# Patient Record
Sex: FEMALE | Race: WHITE | Employment: OTHER | URBAN - METROPOLITAN AREA
[De-identification: names, ages, dates, MRNs, and addresses within clinical notes are randomized per-mention and may not be internally consistent; named-entity substitution may affect disease eponyms.]

---

## 2017-01-03 ENCOUNTER — TELEPHONE (OUTPATIENT)
Dept: FAMILY MEDICINE CLINIC | Age: 82
End: 2017-01-03

## 2017-01-24 ENCOUNTER — TELEPHONE (OUTPATIENT)
Dept: FAMILY MEDICINE CLINIC | Age: 82
End: 2017-01-24

## 2017-02-02 ENCOUNTER — OFFICE VISIT (OUTPATIENT)
Dept: FAMILY MEDICINE CLINIC | Age: 82
End: 2017-02-02

## 2017-02-02 ENCOUNTER — TELEPHONE (OUTPATIENT)
Dept: FAMILY MEDICINE CLINIC | Age: 82
End: 2017-02-02

## 2017-02-02 VITALS
BODY MASS INDEX: 29.27 KG/M2 | SYSTOLIC BLOOD PRESSURE: 130 MMHG | TEMPERATURE: 98.6 F | DIASTOLIC BLOOD PRESSURE: 62 MMHG | OXYGEN SATURATION: 96 % | HEIGHT: 59 IN | HEART RATE: 53 BPM | WEIGHT: 145.2 LBS

## 2017-02-02 DIAGNOSIS — F41.9 ANXIETY AND DEPRESSION: ICD-10-CM

## 2017-02-02 DIAGNOSIS — F51.04 PSYCHOPHYSIOLOGICAL INSOMNIA: ICD-10-CM

## 2017-02-02 DIAGNOSIS — I48.0 PAROXYSMAL ATRIAL FIBRILLATION (HCC): ICD-10-CM

## 2017-02-02 DIAGNOSIS — F32.A ANXIETY AND DEPRESSION: ICD-10-CM

## 2017-02-02 DIAGNOSIS — F03.90 DEMENTIA WITHOUT BEHAVIORAL DISTURBANCE, UNSPECIFIED DEMENTIA TYPE: Primary | ICD-10-CM

## 2017-02-02 PROCEDURE — G8484 FLU IMMUNIZE NO ADMIN: HCPCS | Performed by: FAMILY MEDICINE

## 2017-02-02 PROCEDURE — 1123F ACP DISCUSS/DSCN MKR DOCD: CPT | Performed by: FAMILY MEDICINE

## 2017-02-02 PROCEDURE — 1036F TOBACCO NON-USER: CPT | Performed by: FAMILY MEDICINE

## 2017-02-02 PROCEDURE — 4040F PNEUMOC VAC/ADMIN/RCVD: CPT | Performed by: FAMILY MEDICINE

## 2017-02-02 PROCEDURE — 1090F PRES/ABSN URINE INCON ASSESS: CPT | Performed by: FAMILY MEDICINE

## 2017-02-02 PROCEDURE — G8399 PT W/DXA RESULTS DOCUMENT: HCPCS | Performed by: FAMILY MEDICINE

## 2017-02-02 PROCEDURE — 99213 OFFICE O/P EST LOW 20 MIN: CPT | Performed by: FAMILY MEDICINE

## 2017-02-02 PROCEDURE — G8427 DOCREV CUR MEDS BY ELIG CLIN: HCPCS | Performed by: FAMILY MEDICINE

## 2017-02-02 PROCEDURE — G8420 CALC BMI NORM PARAMETERS: HCPCS | Performed by: FAMILY MEDICINE

## 2017-02-02 RX ORDER — CITALOPRAM 20 MG/1
20 TABLET ORAL DAILY
Qty: 30 TABLET | Refills: 11 | Status: SHIPPED | OUTPATIENT
Start: 2017-02-02 | End: 2018-02-05 | Stop reason: SDUPTHER

## 2017-02-02 RX ORDER — LANOLIN ALCOHOL/MO/W.PET/CERES
3 CREAM (GRAM) TOPICAL NIGHTLY
Qty: 30 TABLET | Refills: 3 | Status: SHIPPED | OUTPATIENT
Start: 2017-02-02 | End: 2020-06-25 | Stop reason: ALTCHOICE

## 2017-02-02 ASSESSMENT — ENCOUNTER SYMPTOMS
DIARRHEA: 0
SHORTNESS OF BREATH: 0
ABDOMINAL PAIN: 0
SORE THROAT: 0
COUGH: 0
WHEEZING: 0
RHINORRHEA: 0
CONSTIPATION: 0

## 2017-02-06 ENCOUNTER — CARE COORDINATION (OUTPATIENT)
Dept: CARE COORDINATION | Age: 82
End: 2017-02-06

## 2017-02-06 ENCOUNTER — TELEPHONE (OUTPATIENT)
Dept: FAMILY MEDICINE CLINIC | Age: 82
End: 2017-02-06

## 2017-02-06 DIAGNOSIS — F03.90 DEMENTIA WITHOUT BEHAVIORAL DISTURBANCE, UNSPECIFIED DEMENTIA TYPE: Primary | ICD-10-CM

## 2017-02-06 RX ORDER — OMEPRAZOLE 20 MG/1
CAPSULE, DELAYED RELEASE ORAL
Qty: 90 CAPSULE | Refills: 3 | Status: SHIPPED | OUTPATIENT
Start: 2017-02-06 | End: 2018-02-04 | Stop reason: SDUPTHER

## 2017-02-06 RX ORDER — DONEPEZIL HYDROCHLORIDE 5 MG/1
TABLET, FILM COATED ORAL
Qty: 90 TABLET | Refills: 3 | Status: SHIPPED | OUTPATIENT
Start: 2017-02-06 | End: 2018-02-04 | Stop reason: SDUPTHER

## 2017-02-07 ENCOUNTER — CARE COORDINATION (OUTPATIENT)
Dept: CARE COORDINATION | Age: 82
End: 2017-02-07

## 2017-02-07 ENCOUNTER — CARE COORDINATION (OUTPATIENT)
Dept: INTERNAL MEDICINE | Age: 82
End: 2017-02-07

## 2017-02-08 ENCOUNTER — CARE COORDINATION (OUTPATIENT)
Dept: INTERNAL MEDICINE | Age: 82
End: 2017-02-08

## 2017-02-08 ENCOUNTER — TELEPHONE (OUTPATIENT)
Dept: CARE COORDINATION | Age: 82
End: 2017-02-08

## 2017-02-08 RX ORDER — NITROFURANTOIN 25; 75 MG/1; MG/1
100 CAPSULE ORAL 2 TIMES DAILY
COMMUNITY
End: 2017-03-20 | Stop reason: ALTCHOICE

## 2017-02-08 RX ORDER — MAGNESIUM OXIDE 400 MG/1
400 TABLET ORAL DAILY
COMMUNITY
End: 2018-01-22

## 2017-02-10 ENCOUNTER — TELEPHONE (OUTPATIENT)
Dept: FAMILY MEDICINE CLINIC | Age: 82
End: 2017-02-10

## 2017-02-20 RX ORDER — GLIPIZIDE 10 MG/1
TABLET ORAL
Qty: 90 TABLET | Refills: 3 | Status: SHIPPED | OUTPATIENT
Start: 2017-02-20 | End: 2017-09-11

## 2017-02-21 ENCOUNTER — TELEPHONE (OUTPATIENT)
Dept: FAMILY MEDICINE CLINIC | Age: 82
End: 2017-02-21

## 2017-02-21 DIAGNOSIS — R30.0 DYSURIA: Primary | ICD-10-CM

## 2017-02-21 PROCEDURE — 81003 URINALYSIS AUTO W/O SCOPE: CPT | Performed by: FAMILY MEDICINE

## 2017-03-03 LAB
BILIRUBIN, POC: ABNORMAL
BLOOD URINE, POC: ABNORMAL
CLARITY, POC: CLEAR
COLOR, POC: ABNORMAL
GLUCOSE URINE, POC: ABNORMAL
KETONES, POC: ABNORMAL
LEUKOCYTE EST, POC: ABNORMAL
NITRITE, POC: ABNORMAL
PH, POC: 5.5
PROTEIN, POC: ABNORMAL
SPECIFIC GRAVITY, POC: 1.02
UROBILINOGEN, POC: ABNORMAL

## 2017-03-06 ENCOUNTER — HOSPITAL ENCOUNTER (OUTPATIENT)
Age: 82
Setting detail: SPECIMEN
Discharge: HOME | End: 2017-03-06
Payer: MEDICARE

## 2017-03-06 DIAGNOSIS — R30.0 DYSURIA: ICD-10-CM

## 2017-03-06 PROCEDURE — 87077 CULTURE AEROBIC IDENTIFY: CPT

## 2017-03-06 PROCEDURE — 87086 URINE CULTURE/COLONY COUNT: CPT

## 2017-03-06 PROCEDURE — 87186 SC STD MICRODIL/AGAR DIL: CPT

## 2017-03-06 RX ORDER — SULFAMETHOXAZOLE AND TRIMETHOPRIM 800; 160 MG/1; MG/1
1 TABLET ORAL 2 TIMES DAILY
Qty: 6 TABLET | Refills: 0 | Status: SHIPPED | OUTPATIENT
Start: 2017-03-06 | End: 2017-03-09

## 2017-03-09 LAB
ORGANISM: ABNORMAL
URINE CULTURE, ROUTINE: ABNORMAL

## 2017-03-14 ENCOUNTER — CARE COORDINATION (OUTPATIENT)
Dept: FAMILY MEDICINE CLINIC | Age: 82
End: 2017-03-14

## 2017-03-20 ENCOUNTER — CARE COORDINATION (OUTPATIENT)
Dept: FAMILY MEDICINE CLINIC | Age: 82
End: 2017-03-20

## 2017-03-20 ENCOUNTER — CARE COORDINATION (OUTPATIENT)
Dept: INTERNAL MEDICINE | Age: 82
End: 2017-03-20

## 2017-03-24 ENCOUNTER — TELEPHONE (OUTPATIENT)
Dept: FAMILY MEDICINE CLINIC | Age: 82
End: 2017-03-24

## 2017-03-24 DIAGNOSIS — R41.0 CONFUSION: ICD-10-CM

## 2017-03-24 DIAGNOSIS — R35.0 URINE FREQUENCY: Primary | ICD-10-CM

## 2017-03-24 DIAGNOSIS — N30.00 ACUTE CYSTITIS WITHOUT HEMATURIA: ICD-10-CM

## 2017-03-24 LAB
BILIRUBIN, POC: ABNORMAL
BLOOD URINE, POC: ABNORMAL
CLARITY, POC: ABNORMAL
COLOR, POC: ABNORMAL
GLUCOSE URINE, POC: ABNORMAL
KETONES, POC: ABNORMAL
LEUKOCYTE EST, POC: ABNORMAL
NITRITE, POC: ABNORMAL
PH, POC: 5
PROTEIN, POC: ABNORMAL
SPECIFIC GRAVITY, POC: 1.02
UROBILINOGEN, POC: ABNORMAL

## 2017-03-24 PROCEDURE — 81003 URINALYSIS AUTO W/O SCOPE: CPT | Performed by: FAMILY MEDICINE

## 2017-03-24 RX ORDER — SULFAMETHOXAZOLE AND TRIMETHOPRIM 800; 160 MG/1; MG/1
1 TABLET ORAL 2 TIMES DAILY
Qty: 6 TABLET | Refills: 0 | Status: SHIPPED | OUTPATIENT
Start: 2017-03-24 | End: 2017-03-27

## 2017-04-04 DIAGNOSIS — E11.8 TYPE 2 DIABETES MELLITUS WITH COMPLICATION, WITHOUT LONG-TERM CURRENT USE OF INSULIN (HCC): Primary | ICD-10-CM

## 2017-04-05 RX ORDER — BLOOD-GLUCOSE METER
KIT MISCELLANEOUS
Qty: 1 KIT | Refills: 0 | Status: SHIPPED | OUTPATIENT
Start: 2017-04-05

## 2017-04-11 ENCOUNTER — TELEPHONE (OUTPATIENT)
Dept: FAMILY MEDICINE CLINIC | Age: 82
End: 2017-04-11

## 2017-04-11 DIAGNOSIS — E11.8 TYPE 2 DIABETES MELLITUS WITH COMPLICATION, WITHOUT LONG-TERM CURRENT USE OF INSULIN (HCC): Primary | ICD-10-CM

## 2017-04-11 DIAGNOSIS — F03.90 DEMENTIA WITHOUT BEHAVIORAL DISTURBANCE, UNSPECIFIED DEMENTIA TYPE: ICD-10-CM

## 2017-04-13 RX ORDER — HALOPERIDOL 0.5 MG/1
0.5 TABLET ORAL NIGHTLY PRN
Qty: 30 TABLET | Refills: 0 | Status: SHIPPED | OUTPATIENT
Start: 2017-04-13 | End: 2017-04-17

## 2017-04-17 RX ORDER — OLANZAPINE 5 MG/1
5 TABLET ORAL NIGHTLY PRN
Qty: 15 TABLET | Refills: 0 | Status: SHIPPED | OUTPATIENT
Start: 2017-04-17 | End: 2017-05-28 | Stop reason: SDUPTHER

## 2017-04-27 ENCOUNTER — CARE COORDINATION (OUTPATIENT)
Dept: INTERNAL MEDICINE | Age: 82
End: 2017-04-27

## 2017-05-09 ENCOUNTER — CARE COORDINATION (OUTPATIENT)
Dept: INTERNAL MEDICINE | Age: 82
End: 2017-05-09

## 2017-05-09 ENCOUNTER — CARE COORDINATION (OUTPATIENT)
Dept: FAMILY MEDICINE CLINIC | Age: 82
End: 2017-05-09

## 2017-05-19 ENCOUNTER — CARE COORDINATION (OUTPATIENT)
Dept: FAMILY MEDICINE CLINIC | Age: 82
End: 2017-05-19

## 2017-05-28 DIAGNOSIS — F03.90 DEMENTIA WITHOUT BEHAVIORAL DISTURBANCE, UNSPECIFIED DEMENTIA TYPE: ICD-10-CM

## 2017-05-30 RX ORDER — OLANZAPINE 5 MG/1
TABLET ORAL
Qty: 15 TABLET | Refills: 0 | Status: SHIPPED | OUTPATIENT
Start: 2017-05-30 | End: 2017-07-31 | Stop reason: SDUPTHER

## 2017-06-01 ENCOUNTER — TELEPHONE (OUTPATIENT)
Dept: FAMILY MEDICINE CLINIC | Age: 82
End: 2017-06-01

## 2017-06-01 DIAGNOSIS — R30.0 DYSURIA: Primary | ICD-10-CM

## 2017-06-01 LAB
BILIRUBIN, POC: NORMAL
BLOOD URINE, POC: NORMAL
CLARITY, POC: CLEAR
COLOR, POC: YELLOW
GLUCOSE URINE, POC: NORMAL
KETONES, POC: NORMAL
LEUKOCYTE EST, POC: NORMAL
NITRITE, POC: NORMAL
PH, POC: 7.5
PROTEIN, POC: NORMAL
SPECIFIC GRAVITY, POC: 1.01
UROBILINOGEN, POC: NORMAL

## 2017-06-01 PROCEDURE — 81003 URINALYSIS AUTO W/O SCOPE: CPT | Performed by: FAMILY MEDICINE

## 2017-06-07 ENCOUNTER — CARE COORDINATION (OUTPATIENT)
Dept: CARE COORDINATION | Age: 82
End: 2017-06-07

## 2017-06-14 ENCOUNTER — TELEPHONE (OUTPATIENT)
Dept: FAMILY MEDICINE CLINIC | Age: 82
End: 2017-06-14

## 2017-06-14 DIAGNOSIS — K64.9 HEMORRHOIDS, UNSPECIFIED HEMORRHOID TYPE: Primary | ICD-10-CM

## 2017-06-14 DIAGNOSIS — E11.8 TYPE 2 DIABETES MELLITUS WITH COMPLICATION, WITHOUT LONG-TERM CURRENT USE OF INSULIN (HCC): ICD-10-CM

## 2017-06-14 RX ORDER — LANCETS 30 GAUGE
1 EACH MISCELLANEOUS 3 TIMES DAILY
Qty: 100 EACH | Refills: 11 | Status: SHIPPED | OUTPATIENT
Start: 2017-06-14

## 2017-06-14 RX ORDER — GLUCOSAMINE HCL/CHONDROITIN SU 500-400 MG
CAPSULE ORAL
Qty: 100 STRIP | Refills: 11 | Status: SHIPPED | OUTPATIENT
Start: 2017-06-14

## 2017-06-14 RX ORDER — SYRING-NEEDL,DISP,INSUL,0.3 ML 30 GX5/16"
1 SYRINGE, EMPTY DISPOSABLE MISCELLANEOUS
Qty: 100 EACH | Refills: 5 | Status: SHIPPED | OUTPATIENT
Start: 2017-06-14 | End: 2017-06-14

## 2017-06-26 ENCOUNTER — OFFICE VISIT (OUTPATIENT)
Dept: FAMILY MEDICINE CLINIC | Age: 82
End: 2017-06-26

## 2017-06-26 VITALS
SYSTOLIC BLOOD PRESSURE: 130 MMHG | TEMPERATURE: 99.3 F | BODY MASS INDEX: 29.89 KG/M2 | OXYGEN SATURATION: 93 % | DIASTOLIC BLOOD PRESSURE: 62 MMHG | HEART RATE: 60 BPM | WEIGHT: 148 LBS

## 2017-06-26 DIAGNOSIS — S39.012A STRAIN OF MUSCLE, FASCIA AND TENDON OF LOWER BACK, INITIAL ENCOUNTER: ICD-10-CM

## 2017-06-26 DIAGNOSIS — I10 ESSENTIAL HYPERTENSION: ICD-10-CM

## 2017-06-26 DIAGNOSIS — H66.002 ACUTE SUPPURATIVE OTITIS MEDIA OF LEFT EAR WITHOUT SPONTANEOUS RUPTURE OF TYMPANIC MEMBRANE, RECURRENCE NOT SPECIFIED: Primary | ICD-10-CM

## 2017-06-26 DIAGNOSIS — F03.90 DEMENTIA WITHOUT BEHAVIORAL DISTURBANCE, UNSPECIFIED DEMENTIA TYPE: ICD-10-CM

## 2017-06-26 PROCEDURE — G8427 DOCREV CUR MEDS BY ELIG CLIN: HCPCS | Performed by: FAMILY MEDICINE

## 2017-06-26 PROCEDURE — 1090F PRES/ABSN URINE INCON ASSESS: CPT | Performed by: FAMILY MEDICINE

## 2017-06-26 PROCEDURE — 1123F ACP DISCUSS/DSCN MKR DOCD: CPT | Performed by: FAMILY MEDICINE

## 2017-06-26 PROCEDURE — 1036F TOBACCO NON-USER: CPT | Performed by: FAMILY MEDICINE

## 2017-06-26 PROCEDURE — G8419 CALC BMI OUT NRM PARAM NOF/U: HCPCS | Performed by: FAMILY MEDICINE

## 2017-06-26 PROCEDURE — G8399 PT W/DXA RESULTS DOCUMENT: HCPCS | Performed by: FAMILY MEDICINE

## 2017-06-26 PROCEDURE — 4040F PNEUMOC VAC/ADMIN/RCVD: CPT | Performed by: FAMILY MEDICINE

## 2017-06-26 PROCEDURE — 99214 OFFICE O/P EST MOD 30 MIN: CPT | Performed by: FAMILY MEDICINE

## 2017-06-26 RX ORDER — GUAIFENESIN AND DEXTROMETHORPHAN HYDROBROMIDE 600; 30 MG/1; MG/1
1 TABLET, EXTENDED RELEASE ORAL 2 TIMES DAILY PRN
Qty: 20 TABLET | Refills: 1 | Status: SHIPPED | OUTPATIENT
Start: 2017-06-26 | End: 2017-07-06

## 2017-06-26 RX ORDER — AMOXICILLIN 250 MG/1
500 CAPSULE ORAL 2 TIMES DAILY
Qty: 40 CAPSULE | Refills: 0 | Status: SHIPPED | OUTPATIENT
Start: 2017-06-26 | End: 2017-07-06

## 2017-06-26 ASSESSMENT — ENCOUNTER SYMPTOMS
SORE THROAT: 0
SHORTNESS OF BREATH: 0
RHINORRHEA: 1
WHEEZING: 0
ABDOMINAL PAIN: 0
CONSTIPATION: 0
COUGH: 1
DIARRHEA: 0

## 2017-06-29 ENCOUNTER — TELEPHONE (OUTPATIENT)
Dept: FAMILY MEDICINE CLINIC | Age: 82
End: 2017-06-29

## 2017-06-30 ENCOUNTER — CARE COORDINATION (OUTPATIENT)
Dept: CARE COORDINATION | Age: 82
End: 2017-06-30

## 2017-07-06 ENCOUNTER — TELEPHONE (OUTPATIENT)
Dept: FAMILY MEDICINE CLINIC | Age: 82
End: 2017-07-06

## 2017-07-12 ENCOUNTER — CARE COORDINATION (OUTPATIENT)
Dept: CARE COORDINATION | Age: 82
End: 2017-07-12

## 2017-07-13 ENCOUNTER — OFFICE VISIT (OUTPATIENT)
Dept: FAMILY MEDICINE CLINIC | Age: 82
End: 2017-07-13

## 2017-07-13 ENCOUNTER — CARE COORDINATOR VISIT (OUTPATIENT)
Dept: FAMILY MEDICINE CLINIC | Age: 82
End: 2017-07-13

## 2017-07-13 VITALS
WEIGHT: 147.2 LBS | OXYGEN SATURATION: 96 % | SYSTOLIC BLOOD PRESSURE: 122 MMHG | BODY MASS INDEX: 29.73 KG/M2 | DIASTOLIC BLOOD PRESSURE: 66 MMHG | TEMPERATURE: 98.4 F | HEART RATE: 70 BPM

## 2017-07-13 DIAGNOSIS — R07.81 RIB PAIN ON RIGHT SIDE: Primary | ICD-10-CM

## 2017-07-13 DIAGNOSIS — I10 ESSENTIAL HYPERTENSION: ICD-10-CM

## 2017-07-13 DIAGNOSIS — E11.8 TYPE 2 DIABETES MELLITUS WITH COMPLICATION, WITHOUT LONG-TERM CURRENT USE OF INSULIN (HCC): ICD-10-CM

## 2017-07-13 DIAGNOSIS — Z91.81 AT HIGH RISK FOR FALLS: ICD-10-CM

## 2017-07-13 LAB — HBA1C MFR BLD: 7.7 %

## 2017-07-13 PROCEDURE — 1036F TOBACCO NON-USER: CPT | Performed by: FAMILY MEDICINE

## 2017-07-13 PROCEDURE — 83036 HEMOGLOBIN GLYCOSYLATED A1C: CPT | Performed by: FAMILY MEDICINE

## 2017-07-13 PROCEDURE — 1090F PRES/ABSN URINE INCON ASSESS: CPT | Performed by: FAMILY MEDICINE

## 2017-07-13 PROCEDURE — G8399 PT W/DXA RESULTS DOCUMENT: HCPCS | Performed by: FAMILY MEDICINE

## 2017-07-13 PROCEDURE — 99214 OFFICE O/P EST MOD 30 MIN: CPT | Performed by: FAMILY MEDICINE

## 2017-07-13 PROCEDURE — G8419 CALC BMI OUT NRM PARAM NOF/U: HCPCS | Performed by: FAMILY MEDICINE

## 2017-07-13 PROCEDURE — 4040F PNEUMOC VAC/ADMIN/RCVD: CPT | Performed by: FAMILY MEDICINE

## 2017-07-13 PROCEDURE — 1123F ACP DISCUSS/DSCN MKR DOCD: CPT | Performed by: FAMILY MEDICINE

## 2017-07-13 PROCEDURE — G8427 DOCREV CUR MEDS BY ELIG CLIN: HCPCS | Performed by: FAMILY MEDICINE

## 2017-07-13 RX ORDER — ACETAMINOPHEN 500 MG
1000 TABLET ORAL 3 TIMES DAILY PRN
Qty: 60 TABLET | Refills: 3 | Status: SHIPPED | OUTPATIENT
Start: 2017-07-13

## 2017-07-13 ASSESSMENT — ENCOUNTER SYMPTOMS
WHEEZING: 0
ABDOMINAL PAIN: 0
RHINORRHEA: 0
SHORTNESS OF BREATH: 0
CONSTIPATION: 0
DIARRHEA: 0
SORE THROAT: 0
COUGH: 0

## 2017-07-31 DIAGNOSIS — F03.90 DEMENTIA WITHOUT BEHAVIORAL DISTURBANCE, UNSPECIFIED DEMENTIA TYPE: ICD-10-CM

## 2017-07-31 RX ORDER — OLANZAPINE 5 MG/1
TABLET ORAL
Qty: 15 TABLET | Refills: 0 | Status: SHIPPED | OUTPATIENT
Start: 2017-07-31 | End: 2017-09-23 | Stop reason: SDUPTHER

## 2017-08-02 ENCOUNTER — CARE COORDINATION (OUTPATIENT)
Dept: CARE COORDINATION | Age: 82
End: 2017-08-02

## 2017-08-24 ENCOUNTER — CARE COORDINATION (OUTPATIENT)
Dept: FAMILY MEDICINE CLINIC | Age: 82
End: 2017-08-24

## 2017-08-25 ENCOUNTER — CARE COORDINATION (OUTPATIENT)
Dept: FAMILY MEDICINE CLINIC | Age: 82
End: 2017-08-25

## 2017-08-28 ENCOUNTER — OFFICE VISIT (OUTPATIENT)
Dept: FAMILY MEDICINE CLINIC | Age: 82
End: 2017-08-28

## 2017-08-28 VITALS
HEART RATE: 60 BPM | WEIGHT: 154 LBS | HEIGHT: 59 IN | BODY MASS INDEX: 31.04 KG/M2 | OXYGEN SATURATION: 98 % | DIASTOLIC BLOOD PRESSURE: 70 MMHG | TEMPERATURE: 98.7 F | SYSTOLIC BLOOD PRESSURE: 124 MMHG

## 2017-08-28 DIAGNOSIS — R55 SYNCOPE, UNSPECIFIED SYNCOPE TYPE: ICD-10-CM

## 2017-08-28 DIAGNOSIS — F03.90 DEMENTIA WITHOUT BEHAVIORAL DISTURBANCE, UNSPECIFIED DEMENTIA TYPE: ICD-10-CM

## 2017-08-28 DIAGNOSIS — H61.23 BILATERAL IMPACTED CERUMEN: ICD-10-CM

## 2017-08-28 DIAGNOSIS — Z09 HOSPITAL DISCHARGE FOLLOW-UP: Primary | ICD-10-CM

## 2017-08-28 PROCEDURE — G8419 CALC BMI OUT NRM PARAM NOF/U: HCPCS | Performed by: FAMILY MEDICINE

## 2017-08-28 PROCEDURE — 4040F PNEUMOC VAC/ADMIN/RCVD: CPT | Performed by: FAMILY MEDICINE

## 2017-08-28 PROCEDURE — 1036F TOBACCO NON-USER: CPT | Performed by: FAMILY MEDICINE

## 2017-08-28 PROCEDURE — G8427 DOCREV CUR MEDS BY ELIG CLIN: HCPCS | Performed by: FAMILY MEDICINE

## 2017-08-28 PROCEDURE — 99213 OFFICE O/P EST LOW 20 MIN: CPT | Performed by: FAMILY MEDICINE

## 2017-08-28 PROCEDURE — 1090F PRES/ABSN URINE INCON ASSESS: CPT | Performed by: FAMILY MEDICINE

## 2017-08-28 PROCEDURE — 1123F ACP DISCUSS/DSCN MKR DOCD: CPT | Performed by: FAMILY MEDICINE

## 2017-08-28 PROCEDURE — G8399 PT W/DXA RESULTS DOCUMENT: HCPCS | Performed by: FAMILY MEDICINE

## 2017-08-28 ASSESSMENT — ENCOUNTER SYMPTOMS
COUGH: 0
RHINORRHEA: 0
WHEEZING: 0
ABDOMINAL PAIN: 0
SHORTNESS OF BREATH: 0
SORE THROAT: 0
CONSTIPATION: 0
DIARRHEA: 0

## 2017-08-29 ENCOUNTER — CARE COORDINATION (OUTPATIENT)
Dept: CARE COORDINATION | Age: 82
End: 2017-08-29

## 2017-09-05 ENCOUNTER — OFFICE VISIT (OUTPATIENT)
Dept: FAMILY MEDICINE CLINIC | Age: 82
End: 2017-09-05

## 2017-09-05 VITALS
DIASTOLIC BLOOD PRESSURE: 80 MMHG | OXYGEN SATURATION: 95 % | HEART RATE: 61 BPM | SYSTOLIC BLOOD PRESSURE: 110 MMHG | TEMPERATURE: 98.9 F

## 2017-09-05 DIAGNOSIS — S98.132S: ICD-10-CM

## 2017-09-05 DIAGNOSIS — S98.219A TRAUMATIC AMPUTATION OF MULTIPLE TOES (HCC): ICD-10-CM

## 2017-09-05 DIAGNOSIS — Z89.9 H/O AMPUTATION: ICD-10-CM

## 2017-09-05 DIAGNOSIS — R41.82 ALTERED MENTAL STATUS, UNSPECIFIED ALTERED MENTAL STATUS TYPE: Primary | ICD-10-CM

## 2017-09-05 PROCEDURE — 1123F ACP DISCUSS/DSCN MKR DOCD: CPT | Performed by: PHYSICIAN ASSISTANT

## 2017-09-05 PROCEDURE — G8399 PT W/DXA RESULTS DOCUMENT: HCPCS | Performed by: PHYSICIAN ASSISTANT

## 2017-09-05 PROCEDURE — 4040F PNEUMOC VAC/ADMIN/RCVD: CPT | Performed by: PHYSICIAN ASSISTANT

## 2017-09-05 PROCEDURE — 1036F TOBACCO NON-USER: CPT | Performed by: PHYSICIAN ASSISTANT

## 2017-09-05 PROCEDURE — 1090F PRES/ABSN URINE INCON ASSESS: CPT | Performed by: PHYSICIAN ASSISTANT

## 2017-09-05 PROCEDURE — G8417 CALC BMI ABV UP PARAM F/U: HCPCS | Performed by: PHYSICIAN ASSISTANT

## 2017-09-05 PROCEDURE — 99212 OFFICE O/P EST SF 10 MIN: CPT | Performed by: PHYSICIAN ASSISTANT

## 2017-09-05 PROCEDURE — G8427 DOCREV CUR MEDS BY ELIG CLIN: HCPCS | Performed by: PHYSICIAN ASSISTANT

## 2017-09-07 PROBLEM — S98.139A: Status: ACTIVE | Noted: 2017-09-07

## 2017-09-11 ENCOUNTER — OFFICE VISIT (OUTPATIENT)
Dept: FAMILY MEDICINE CLINIC | Age: 82
End: 2017-09-11

## 2017-09-11 VITALS
HEART RATE: 64 BPM | DIASTOLIC BLOOD PRESSURE: 80 MMHG | TEMPERATURE: 98.2 F | OXYGEN SATURATION: 95 % | SYSTOLIC BLOOD PRESSURE: 145 MMHG | WEIGHT: 152.8 LBS | BODY MASS INDEX: 30.86 KG/M2

## 2017-09-11 DIAGNOSIS — I10 ESSENTIAL HYPERTENSION: Primary | ICD-10-CM

## 2017-09-11 DIAGNOSIS — F03.90 DEMENTIA WITHOUT BEHAVIORAL DISTURBANCE, UNSPECIFIED DEMENTIA TYPE: ICD-10-CM

## 2017-09-11 DIAGNOSIS — I48.0 PAROXYSMAL ATRIAL FIBRILLATION (HCC): ICD-10-CM

## 2017-09-11 DIAGNOSIS — E11.8 TYPE 2 DIABETES MELLITUS WITH COMPLICATION, WITHOUT LONG-TERM CURRENT USE OF INSULIN (HCC): ICD-10-CM

## 2017-09-11 DIAGNOSIS — R06.02 SOB (SHORTNESS OF BREATH): ICD-10-CM

## 2017-09-11 PROCEDURE — 1036F TOBACCO NON-USER: CPT | Performed by: FAMILY MEDICINE

## 2017-09-11 PROCEDURE — 4040F PNEUMOC VAC/ADMIN/RCVD: CPT | Performed by: FAMILY MEDICINE

## 2017-09-11 PROCEDURE — 99214 OFFICE O/P EST MOD 30 MIN: CPT | Performed by: FAMILY MEDICINE

## 2017-09-11 PROCEDURE — 1123F ACP DISCUSS/DSCN MKR DOCD: CPT | Performed by: FAMILY MEDICINE

## 2017-09-11 PROCEDURE — G8399 PT W/DXA RESULTS DOCUMENT: HCPCS | Performed by: FAMILY MEDICINE

## 2017-09-11 PROCEDURE — G8417 CALC BMI ABV UP PARAM F/U: HCPCS | Performed by: FAMILY MEDICINE

## 2017-09-11 PROCEDURE — G8427 DOCREV CUR MEDS BY ELIG CLIN: HCPCS | Performed by: FAMILY MEDICINE

## 2017-09-11 PROCEDURE — 1090F PRES/ABSN URINE INCON ASSESS: CPT | Performed by: FAMILY MEDICINE

## 2017-09-11 RX ORDER — GLIPIZIDE 10 MG/1
10 TABLET ORAL DAILY
Qty: 60 TABLET | Refills: 3 | Status: SHIPPED | OUTPATIENT
Start: 2017-09-11 | End: 2018-06-04 | Stop reason: SDUPTHER

## 2017-09-11 RX ORDER — AMLODIPINE BESYLATE 5 MG/1
5 TABLET ORAL DAILY
COMMUNITY
Start: 2017-09-06 | End: 2017-10-06 | Stop reason: SDUPTHER

## 2017-09-11 RX ORDER — ENALAPRIL MALEATE 10 MG/1
10 TABLET ORAL DAILY
Qty: 30 TABLET | Refills: 3 | Status: SHIPPED | OUTPATIENT
Start: 2017-09-11 | End: 2018-02-26 | Stop reason: SDUPTHER

## 2017-09-11 ASSESSMENT — ENCOUNTER SYMPTOMS
ABDOMINAL PAIN: 0
RHINORRHEA: 0
COUGH: 0
WHEEZING: 0
SORE THROAT: 0
DIARRHEA: 0
CONSTIPATION: 0
SHORTNESS OF BREATH: 0

## 2017-09-14 ENCOUNTER — CARE COORDINATION (OUTPATIENT)
Dept: CARE COORDINATION | Age: 82
End: 2017-09-14

## 2017-09-15 ENCOUNTER — CARE COORDINATION (OUTPATIENT)
Dept: FAMILY MEDICINE CLINIC | Age: 82
End: 2017-09-15

## 2017-09-23 DIAGNOSIS — F03.90 DEMENTIA WITHOUT BEHAVIORAL DISTURBANCE, UNSPECIFIED DEMENTIA TYPE: ICD-10-CM

## 2017-09-25 RX ORDER — OLANZAPINE 5 MG/1
TABLET ORAL
Qty: 15 TABLET | Refills: 0 | Status: SHIPPED | OUTPATIENT
Start: 2017-09-25 | End: 2017-12-26 | Stop reason: SDUPTHER

## 2017-09-28 ENCOUNTER — CARE COORDINATION (OUTPATIENT)
Dept: CARE COORDINATION | Age: 82
End: 2017-09-28

## 2017-10-03 ENCOUNTER — OFFICE VISIT (OUTPATIENT)
Dept: FAMILY MEDICINE CLINIC | Age: 82
End: 2017-10-03

## 2017-10-03 VITALS
BODY MASS INDEX: 32.32 KG/M2 | WEIGHT: 160 LBS | RESPIRATION RATE: 16 BRPM | HEART RATE: 58 BPM | SYSTOLIC BLOOD PRESSURE: 138 MMHG | DIASTOLIC BLOOD PRESSURE: 86 MMHG

## 2017-10-03 DIAGNOSIS — G89.29 CHRONIC BILATERAL LOW BACK PAIN WITHOUT SCIATICA: Primary | ICD-10-CM

## 2017-10-03 DIAGNOSIS — M54.50 CHRONIC BILATERAL LOW BACK PAIN WITHOUT SCIATICA: Primary | ICD-10-CM

## 2017-10-03 PROCEDURE — 1123F ACP DISCUSS/DSCN MKR DOCD: CPT | Performed by: PHYSICIAN ASSISTANT

## 2017-10-03 PROCEDURE — 4040F PNEUMOC VAC/ADMIN/RCVD: CPT | Performed by: PHYSICIAN ASSISTANT

## 2017-10-03 PROCEDURE — 1090F PRES/ABSN URINE INCON ASSESS: CPT | Performed by: PHYSICIAN ASSISTANT

## 2017-10-03 PROCEDURE — G8417 CALC BMI ABV UP PARAM F/U: HCPCS | Performed by: PHYSICIAN ASSISTANT

## 2017-10-03 PROCEDURE — G8427 DOCREV CUR MEDS BY ELIG CLIN: HCPCS | Performed by: PHYSICIAN ASSISTANT

## 2017-10-03 PROCEDURE — 99213 OFFICE O/P EST LOW 20 MIN: CPT | Performed by: PHYSICIAN ASSISTANT

## 2017-10-03 PROCEDURE — 1036F TOBACCO NON-USER: CPT | Performed by: PHYSICIAN ASSISTANT

## 2017-10-03 PROCEDURE — G8399 PT W/DXA RESULTS DOCUMENT: HCPCS | Performed by: PHYSICIAN ASSISTANT

## 2017-10-03 PROCEDURE — G8484 FLU IMMUNIZE NO ADMIN: HCPCS | Performed by: PHYSICIAN ASSISTANT

## 2017-10-03 NOTE — PROGRESS NOTES
[address], 80 y.o. female presents today with:  Chief Complaint   Patient presents with    Back Pain     PCP:  Francisco Mackay MD      HPI    Intermittent low back pain for many years  Pain is not back, and does not keep her from doing ADL  Tylenol controls the pain , but most of the time lying down helps  Pain does not keep her up at night  Denies dysuria. Numbness or leg pain       Past Medical History:   Diagnosis Date    [facility]     Cancer ([facility] 2010    breast left    Depression     Diabetes mellitus ([facility]     Diabetes mellitus [facility]     Diverticular disease 2004    ct pelvis    DJD (degenerative joint disease) of knee     both    GERD (gastroesophageal reflux disease)     Hernia, hiatal     egd    High blood pressure     Hyperlipidemia     Hypertension     Insomnia     Osteopenia 2003    Pancreas cyst 2007     Past Surgical History:   Procedure Laterality Date    APPENDECTOMY  1974    COLONOSCOPY  2006    COSMETIC SURGERY  2011    both eye lids    ENDOSCOPY, COLON, DIAGNOSTIC      EYE SURGERY      cataracts    HYSTERECTOMY  1974    KNEE ARTHROSCOPY  4/2007    left    LUMBAR LAMINECTOMY  1994    MASTECTOMY  2010    right    OVARY REMOVAL  1974    only right    ROTATOR CUFF REPAIR  2002    right    ROTATOR CUFF REPAIR  2003    left    TOE AMPUTATION  1969    left first and second tips removed   [address]     Social History     Social History    Marital status:      Spouse name: N/A    Number of children: N/A    Years of education: N/A     Occupational History    Not on file. Social History Main Topics    Smoking status: Never Smoker    Smokeless tobacco: Never Used    Alcohol use No    Drug use: No    Sexual activity: Not on file     Other Topics Concern    Not on file     Social History Narrative     Review of Systems   Constitutional: Negative for fever.    Musculoskeletal: Positive for

## 2017-10-03 NOTE — MR AVS SNAPSHOT
After Visit Summary             Nadine Paez   10/3/2017 2:30 PM   Office Visit    Description:  Female : 1934   Provider:  MERCEDES Leung   Department:  [address] [place] [address] and Future Appointments         Below is a list of your follow-up and future appointments. This may not be a complete list as you may have made appointments directly with providers that we are not aware of or your providers may have made some for you. Please call your providers to confirm appointments. It is important to keep your appointments. Please bring your current insurance card, photo ID, co-pay, and all medication bottles to your appointment. If self-pay, payment is expected at the time of service. Information from Your Visit        Department     Name Address Phone Fax    [facility] [address] [facility] [address] [facility] [phone]      You Were Seen for:         Comments    Chronic bilateral low back pain without sciatica   [[id number]]         Vital Signs     Blood Pressure Pulse Respirations Weight Body Mass Index Smoking Status    138/86 (Site: Right Arm, Position: Sitting, Cuff Size: Medium Adult) 58 16 160 lb (72.6 kg) 32.32 kg/m2 Never Smoker      Additional Information about your Body Mass Index (BMI)           Your BMI as listed above is considered obese (30 or more). BMI is an estimate of body fat, calculated from your height and weight. The higher your BMI, the greater your risk of heart disease, high blood pressure, type 2 diabetes, stroke, gallstones, arthritis, sleep apnea, and certain cancers. BMI is not perfect. It may overestimate body fat in athletes and people who are more muscular. Even a small weight loss (between 5 and 10 percent of your current weight) by decreasing your calorie intake and becoming more physically active will help lower your risk of developing or worsening diseases associated with obesity. Learn more at: [employer].co.uk             Medications and Orders      Your Current Medications Are              OLANZapine (ZYPREXA) 5 MG tablet TAKE ONE TABLET BY MOUTH AT BEDTIME AS NEEDED FOR  AGITATION    amLODIPine (NORVASC) 5 MG tablet Take 5 mg by mouth daily     glipiZIDE (GLUCOTROL) 10 MG tablet Take 1 tablet by mouth daily    enalapril (VASOTEC) 10 MG tablet Take 1 tablet by mouth daily    acetaminophen (APAP EXTRA STRENGTH) 500 MG tablet Take 2 tablets by mouth 3 times daily as needed for Pain    hydrocortisone (ANUSOL-HC) 2.5 % rectal cream Place rectally 2 times daily. Blood Glucose Monitoring Suppl TROY Use TID    Glucose Blood (BLOOD GLUCOSE TEST STRIPS) STRP Use TID    Lancets MISC 1 each by Does not apply route 3 times daily    Blood Glucose Monitoring Suppl (FREESTYLE FREEDOM) KIT Test once daily. DX: E11.8    magnesium oxide (MAG-OX) 400 MG tablet Take 400 mg by mouth daily 1 tablet oral daily    donepezil (ARICEPT) 5 MG tablet TAKE ONE TABLET BY MOUTH ONCE DAILY    omeprazole (PRILOSEC) 20 MG delayed release capsule TAKE ONE CAPSULE BY MOUTH ONCE DAILY    citalopram (CELEXA) 20 MG tablet Take 1 tablet by mouth daily    melatonin 3 MG TABS tablet Take 1 tablet by mouth nightly    sotalol (BETAPACE) 80 MG tablet Take 1 tablet by mouth 2 times daily    Handicap Placard MISC by Does not apply route Dx: A fib  Expires: 2018    Calcium Carbonate-Vitamin D (CALCIUM + D PO) Take 1 tablet by mouth daily    Compression Stockings MISC by Does not apply route. Knee high    atenolol (TENORMIN) 50 MG tablet Take 25 mg by mouth daily Take 1 tablet daily    atorvastatin (LIPITOR) 10 MG tablet Take 10 mg by mouth daily At bedtime    warfarin (COUMADIN) 2.5 MG tablet Take 2.5 mg by mouth Take 1/2 tab every Thursday; Take 1 tab every Mon, Tues, Wed, Fri, Sat, Sun.    aspirin 81 MG tablet Take 81 mg by mouth daily.       Allergies           No Known Allergies Additional Information        Basic Information     Date Of Birth Sex Race Ethnicity Preferred Language    1934 Female White Non-/Non  English      Problem List as of 10/3/2017  Date Reviewed: 10/3/2017                Amputation, traumatic, toes ([facility]    Atrial fibrillation ([facility]    Dementia without behavioral disturbance    HTN (hypertension)    Hyperlipidemia    DM type 2 (diabetes mellitus, type 2) ([facility]    Related Goals    HEMOGLOBIN A1C < 7.0    Malignant neoplasm of female breast ([facility]      Your Goals as of 10/3/2017 at 3:30 PM              9/15/17    8/24/17    7/13/17       Care Coordination    Reduce Falls    On track  On track      Notes    I will reduce my risk of falls by the following: obtain a bedrail for bed to prevent fa;lling out of bed or when attempting to get out of bed. Barriers: coordination with son/POA to obtain equipment  Plan for overcoming my barriers: CG will work with son? POA on obtaining an under the mattress bed rail from [facility] or other home medical equipment company; son to coordinate with [facility] insurance for equipment needs  Confidence: 7/10  Anticipated Goal Completion Date: 1 month      Conditions and Symptoms   On track        Notes    I will follow my Zone Management tool to seek urgent or emergent care.  (Caregivers will)    Barriers: lack of education  Plan for overcoming my barriers: Caregivers will review Diabetes zone management guide, caregivers will check patient's BG daily and as needed for sx of low/high BG  Confidence: 8/10  Anticipated Goal Completion Date: 1 month        Self Monitoring           Notes    Self-Monitored Blood Glucose - I will check my blood sugar Fasting blood sugar and Other: before dinner or at bedtime and as needed for sx  Blood Pressure - I will take my blood pressure as directed - Daily  I will notify my provider of any changes in blood pressure associated with symptoms of dizziness, falls, passing out, headache, confusion/change in mental status. Caregiver will do monitoring  Son will provide BP monitor for [employer]  Patient Reported Blood Pressure No flowsheet data found. Barriers: lack of education  Plan for overcoming my barriers: Son will provide BP machine, caregivers will read HTN education handouts mailed to home; caregivers will monitor BG daily as scheduled, caregivers will moniotr BP daily    Confidence: 5/10  Anticipated Goal Completion Date: 1 month           Result Component    HEMOGLOBIN A1C < 7.0       7.7    Related Problems    DM type 2 (diabetes mellitus, type 2) ([facility]      Immunizations as of 10/3/2017     Name Date    Influenza Virus Vaccine 10/1/2015, 10/21/2014, 10/2/2013, 10/5/2011    Influenza Whole 9/10/2009, 10/10/2008, 10/10/2007, 11/10/2006, 9/10/2005    Influenza, Quadv, 3 Years and older, IM 11/4/2016, 10/28/2003, 10/21/2002    Pneumococcal 13-valent Conjugate ([id number]) 12/9/2015    Pneumococcal Polysaccharide ([id number]) 9/1/2006, 9/1/2000, 1/1/2000      Preventive Care        Date Due    Tetanus Combination Vaccine (1 - Tdap) 9/2/1953    Yearly Flu Vaccine (1) 9/1/2017            [employer] Signup           byUs allows you to send messages to your doctor, view your test results, renew your prescriptions, schedule appointments, view visit notes, and more. How Do I Sign Up? 1. In your Internet browser, go to [email or web address]  2. Click on the Sign Up Now link in the Sign In box. You will see the New Member Sign Up page. 3. Enter your byUs Access Code exactly as it appears below. You will not need to use this code after youve completed the sign-up process. If you do not sign up before the expiration date, you must request a new code. byUs Access Code: [email or web address]  Expires: 10/27/2017  1:01 PM    4.  Enter your Social Security Number ([id number]) and Date of Birth

## 2017-10-04 ASSESSMENT — ENCOUNTER SYMPTOMS: BACK PAIN: 1

## 2017-10-06 RX ORDER — AMLODIPINE BESYLATE 5 MG/1
5 TABLET ORAL DAILY
Qty: 30 TABLET | Refills: 3 | Status: SHIPPED | OUTPATIENT
Start: 2017-10-06 | End: 2018-02-04 | Stop reason: SDUPTHER

## 2017-10-06 NOTE — TELEPHONE ENCOUNTER
Medication: Norvasc    Last office visit: 10/3/2017    Last labs: due for fasting labs    Last filled: unknown, written by a historical provider

## 2017-10-11 ENCOUNTER — ANTI-COAG VISIT (OUTPATIENT)
Dept: FAMILY MEDICINE CLINIC | Age: 82
End: 2017-10-11

## 2017-10-11 DIAGNOSIS — I48.91 ATRIAL FIBRILLATION, UNSPECIFIED TYPE (HCC): Primary | ICD-10-CM

## 2017-10-11 LAB
INTERNATIONAL NORMALIZATION RATIO, POC: 2.9
PROTHROMBIN TIME, POC: NORMAL

## 2017-10-11 PROCEDURE — 85610 PROTHROMBIN TIME: CPT | Performed by: FAMILY MEDICINE

## 2017-10-17 ENCOUNTER — CARE COORDINATION (OUTPATIENT)
Dept: CARE COORDINATION | Age: 82
End: 2017-10-17

## 2017-11-01 ENCOUNTER — CARE COORDINATION (OUTPATIENT)
Dept: CARE COORDINATION | Age: 82
End: 2017-11-01

## 2017-11-01 NOTE — CARE COORDINATION
Silvina  Telephone call with Odin/Son. He feels things are going well at home. He requested contact information for [employer]. Provided Odin with this requested information. He would like to see if patient would be eligible for assistance as her spouse was a .

## 2017-11-29 ENCOUNTER — CARE COORDINATION (OUTPATIENT)
Dept: CARE COORDINATION | Age: 82
End: 2017-11-29

## 2017-12-18 ENCOUNTER — CARE COORDINATION (OUTPATIENT)
Dept: CARE COORDINATION | Age: 82
End: 2017-12-18
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
## 2017-12-20 ENCOUNTER — CARE COORDINATION (OUTPATIENT)
Dept: CARE COORDINATION | Age: 82
End: 2017-12-20

## 2017-12-26 DIAGNOSIS — F03.90 DEMENTIA WITHOUT BEHAVIORAL DISTURBANCE, UNSPECIFIED DEMENTIA TYPE: ICD-10-CM

## 2017-12-26 RX ORDER — OLANZAPINE 5 MG/1
TABLET ORAL
Qty: 15 TABLET | Refills: 0 | Status: SHIPPED | OUTPATIENT
Start: 2017-12-26 | End: 2018-02-04 | Stop reason: SDUPTHER

## 2018-01-04 ENCOUNTER — CARE COORDINATION (OUTPATIENT)
Dept: CARE COORDINATION | Age: 83
End: 2018-01-04

## 2018-01-05 ENCOUNTER — OFFICE VISIT (OUTPATIENT)
Dept: FAMILY MEDICINE CLINIC | Age: 83
End: 2018-01-05

## 2018-01-05 VITALS
SYSTOLIC BLOOD PRESSURE: 128 MMHG | BODY MASS INDEX: 33.57 KG/M2 | HEART RATE: 64 BPM | WEIGHT: 166.2 LBS | RESPIRATION RATE: 14 BRPM | OXYGEN SATURATION: 99 % | DIASTOLIC BLOOD PRESSURE: 72 MMHG

## 2018-01-05 DIAGNOSIS — E11.9 TYPE 2 DIABETES MELLITUS WITHOUT COMPLICATION, WITHOUT LONG-TERM CURRENT USE OF INSULIN (HCC): ICD-10-CM

## 2018-01-05 DIAGNOSIS — F03.90 DEMENTIA WITHOUT BEHAVIORAL DISTURBANCE, UNSPECIFIED DEMENTIA TYPE: ICD-10-CM

## 2018-01-05 DIAGNOSIS — M79.89 LEG SWELLING: ICD-10-CM

## 2018-01-05 DIAGNOSIS — E11.8 TYPE 2 DIABETES MELLITUS WITH COMPLICATION, WITHOUT LONG-TERM CURRENT USE OF INSULIN (HCC): ICD-10-CM

## 2018-01-05 DIAGNOSIS — R06.02 SOB (SHORTNESS OF BREATH): Primary | ICD-10-CM

## 2018-01-05 DIAGNOSIS — K64.9 BLEEDING HEMORRHOID: ICD-10-CM

## 2018-01-05 DIAGNOSIS — I10 ESSENTIAL HYPERTENSION: ICD-10-CM

## 2018-01-05 DIAGNOSIS — I48.91 ATRIAL FIBRILLATION, UNSPECIFIED TYPE (HCC): ICD-10-CM

## 2018-01-05 LAB
INTERNATIONAL NORMALIZATION RATIO, POC: 2.5
PROTHROMBIN TIME, POC: NORMAL

## 2018-01-05 PROCEDURE — 1036F TOBACCO NON-USER: CPT | Performed by: FAMILY MEDICINE

## 2018-01-05 PROCEDURE — G8417 CALC BMI ABV UP PARAM F/U: HCPCS | Performed by: FAMILY MEDICINE

## 2018-01-05 PROCEDURE — 1090F PRES/ABSN URINE INCON ASSESS: CPT | Performed by: FAMILY MEDICINE

## 2018-01-05 PROCEDURE — 1123F ACP DISCUSS/DSCN MKR DOCD: CPT | Performed by: FAMILY MEDICINE

## 2018-01-05 PROCEDURE — 99215 OFFICE O/P EST HI 40 MIN: CPT | Performed by: FAMILY MEDICINE

## 2018-01-05 PROCEDURE — G8427 DOCREV CUR MEDS BY ELIG CLIN: HCPCS | Performed by: FAMILY MEDICINE

## 2018-01-05 PROCEDURE — G8399 PT W/DXA RESULTS DOCUMENT: HCPCS | Performed by: FAMILY MEDICINE

## 2018-01-05 PROCEDURE — 4040F PNEUMOC VAC/ADMIN/RCVD: CPT | Performed by: FAMILY MEDICINE

## 2018-01-05 PROCEDURE — G8484 FLU IMMUNIZE NO ADMIN: HCPCS | Performed by: FAMILY MEDICINE

## 2018-01-05 RX ORDER — FUROSEMIDE 20 MG/1
20 TABLET ORAL DAILY
Qty: 10 TABLET | Refills: 0 | Status: SHIPPED | OUTPATIENT
Start: 2018-01-05 | End: 2018-05-15 | Stop reason: ALTCHOICE

## 2018-01-05 ASSESSMENT — ENCOUNTER SYMPTOMS
SHORTNESS OF BREATH: 1
ABDOMINAL PAIN: 0
DIARRHEA: 0
WHEEZING: 1
BACK PAIN: 1
COUGH: 0
BLOOD IN STOOL: 1
SORE THROAT: 0
RHINORRHEA: 0
ANAL BLEEDING: 1
CONSTIPATION: 0

## 2018-01-05 NOTE — PROGRESS NOTES
[address] PRIMARY CARE [address] [address]  Dept: [phone]  Dept Fax: [phone]  Loc: [phone]   Chief Complaint  Chief Complaint   Patient presents with    Hemorrhoids    Leg Swelling     Bilateral; x2weeks    Wheezing     Has been going on for a while on and off    Blood Sugar Problem     High 200's at home    Discuss Medications     Blood pressure meds       HPI:  80 y.o. female who presents for LBP:  (here with health aid and son Odin)    Wheezing/SOB: on/off but worse over the past week. A little more fatigue with activity lately. Swollen legs: on/off xmonths and worse over the past week. Worse with being up on her feet. No recent med changes. No pain. LBP: has been complaining of the back pain occasionally. Worse with activity like walking and getting dressed. No inciting injury or surgery on the back. DM2: has been running higher this. AM sugars routinely greater than 200 and afternoon sugars in the 300s at times. No low sugars. HTN: atenolol and sotalol; sees cardiology for the afib and INR 2.5 today. Wants to know if this combination is safe. Bleeding hemorrhoid: has had for years but has had heavier bleeding of bright red blood this week not only with stools but also between stools. She uses anusol 1-2x's/day.      Past Medical History:   Diagnosis Date    Atrial fibrillation ([facility]     Cancer ([address] 2010    breast left    Depression     Diabetes mellitus ([address]     Diabetes mellitus ([address]     Diverticular disease 2004    ct pelvis    DJD (degenerative joint disease) of knee     both    GERD (gastroesophageal reflux disease)     Hernia, hiatal     egd    High blood pressure     Hyperlipidemia     Hypertension     Insomnia     Osteopenia 2003    Pancreas cyst 2007     Past Surgical History:   Procedure Laterality Date    APPENDECTOMY  1974    COLONOSCOPY  2006    COSMETIC syncope, light-headedness, numbness and headaches. Psychiatric/Behavioral: Negative for sleep disturbance. The patient is not nervous/anxious. Vitals:    01/05/18 1340   BP: 128/72   Site: Right Arm   Position: Sitting   Cuff Size: Medium Adult   Pulse: 64   Resp: 14   SpO2: 99%   Weight: 166 lb 3.2 oz (75.4 kg)       Physical exam:  Physical Exam   Constitutional: She is oriented to person, place, and time. She appears well-developed and well-nourished. No distress. HENT:   Head: Normocephalic and atraumatic. Mouth/Throat: No oropharyngeal exudate. Eyes: EOM are normal.   Neck: Normal range of motion. No thyromegaly present. Cardiovascular: Normal rate, regular rhythm and normal heart sounds. No murmur heard. Pulmonary/Chest: Effort normal and breath sounds normal. No respiratory distress. She has no wheezes. No crackles; difficult to assess JVD or HJR; b/l LEs pitting edema. Abdominal: Soft. She exhibits no distension. There is no tenderness. There is no rebound and no guarding. Musculoskeletal: She exhibits no edema. Lymphadenopathy:     She has no cervical adenopathy. Neurological: She is alert and oriented to person, place, and time. Skin: Skin is warm and dry. Psychiatric: She has a normal mood and affect. Her behavior is normal.   Vitals reviewed. Assessment/Plan:  80 y.o. female here mainly for DM2:  - DM2: sugars running high at home; cont the glipizide (max); start farxiga (max). Will get a1c with labs today. - Afib: INR 2.5; this gets followed by her cardiologist and results will be sent; Should cont the sotalol and atenolol as has already been established with her cardiologist  - Hemorrhoid: No bleeding in clinic but has significant hemorrhoids. Sending to GI to see if candidate for procedural intervention.  - Leg swelling/SOB: fluid overload possibly related to dHF. Start 10 days of lasix and getting echo. Should cont the compression stockings.    - Getting

## 2018-01-11 ENCOUNTER — HOSPITAL ENCOUNTER (OUTPATIENT)
Age: 83
Setting detail: SPECIMEN
Discharge: HOME | End: 2018-01-11
Payer: MEDICARE

## 2018-01-11 DIAGNOSIS — E11.8 TYPE 2 DIABETES MELLITUS WITH COMPLICATION, WITHOUT LONG-TERM CURRENT USE OF INSULIN (HCC): ICD-10-CM

## 2018-01-11 DIAGNOSIS — R06.02 SOB (SHORTNESS OF BREATH): ICD-10-CM

## 2018-01-11 LAB
ALBUMIN SERPL-MCNC: 4.1 G/DL (ref 3.9–4.9)
ALP BLD-CCNC: 73 U/L (ref 40–130)
ALT SERPL-CCNC: 18 U/L (ref 0–33)
ANION GAP SERPL CALCULATED.3IONS-SCNC: 17 MEQ/L (ref 7–13)
AST SERPL-CCNC: 25 U/L (ref 0–35)
BILIRUB SERPL-MCNC: 0.3 MG/DL (ref 0–1.2)
BUN BLDV-MCNC: 18 MG/DL (ref 8–23)
CALCIUM SERPL-MCNC: 8.7 MG/DL (ref 8.6–10.2)
CHLORIDE BLD-SCNC: 100 MEQ/L (ref 98–107)
CO2: 27 MEQ/L (ref 22–29)
CREAT SERPL-MCNC: 1.56 MG/DL (ref 0.5–0.9)
GFR AFRICAN AMERICAN: 38.3
GFR NON-AFRICAN AMERICAN: 31.7
GLOBULIN: 3.1 G/DL (ref 2.3–3.5)
GLUCOSE BLD-MCNC: 127 MG/DL (ref 74–109)
HBA1C MFR BLD: 8.1 % (ref 4.8–5.9)
HCT VFR BLD CALC: 32.8 % (ref 37–47)
HEMOGLOBIN: 10.3 G/DL (ref 12–16)
MCH RBC QN AUTO: 25.3 PG (ref 27–31.3)
MCHC RBC AUTO-ENTMCNC: 31.4 % (ref 33–37)
MCV RBC AUTO: 80.6 FL (ref 82–100)
PDW BLD-RTO: 17.2 % (ref 11.5–14.5)
PLATELET # BLD: 338 K/UL (ref 130–400)
POTASSIUM SERPL-SCNC: 4.9 MEQ/L (ref 3.5–5.1)
RBC # BLD: 4.07 M/UL (ref 4.2–5.4)
SODIUM BLD-SCNC: 144 MEQ/L (ref 132–144)
TOTAL PROTEIN: 7.2 G/DL (ref 6.4–8.1)
WBC # BLD: 7.6 K/UL (ref 4.8–10.8)

## 2018-01-11 PROCEDURE — 85027 COMPLETE CBC AUTOMATED: CPT

## 2018-01-11 PROCEDURE — 80053 COMPREHEN METABOLIC PANEL: CPT

## 2018-01-11 PROCEDURE — 83036 HEMOGLOBIN GLYCOSYLATED A1C: CPT

## 2018-01-12 DIAGNOSIS — D50.0 IRON DEFICIENCY ANEMIA DUE TO CHRONIC BLOOD LOSS: Primary | ICD-10-CM

## 2018-01-12 RX ORDER — LANOLIN ALCOHOL/MO/W.PET/CERES
325 CREAM (GRAM) TOPICAL
Qty: 90 TABLET | Refills: 1 | Status: SHIPPED | OUTPATIENT
Start: 2018-01-12

## 2018-01-16 ENCOUNTER — TELEPHONE (OUTPATIENT)
Dept: SURGERY | Age: 83
End: 2018-01-16

## 2018-01-16 ENCOUNTER — HOSPITAL ENCOUNTER (OUTPATIENT)
Dept: NON INVASIVE DIAGNOSTICS | Age: 83
Discharge: HOME | End: 2018-01-16
Payer: MEDICARE

## 2018-01-16 ENCOUNTER — OFFICE VISIT (OUTPATIENT)
Dept: SURGERY | Age: 83
End: 2018-01-16

## 2018-01-16 VITALS
OXYGEN SATURATION: 97 % | HEIGHT: 60 IN | WEIGHT: 161 LBS | DIASTOLIC BLOOD PRESSURE: 62 MMHG | SYSTOLIC BLOOD PRESSURE: 120 MMHG | HEART RATE: 70 BPM | BODY MASS INDEX: 31.61 KG/M2

## 2018-01-16 DIAGNOSIS — M79.89 LEG SWELLING: ICD-10-CM

## 2018-01-16 DIAGNOSIS — R06.02 SOB (SHORTNESS OF BREATH): ICD-10-CM

## 2018-01-16 DIAGNOSIS — K92.2 LGI BLEED: Primary | ICD-10-CM

## 2018-01-16 LAB
LV EF: 50 %
LVEF MODALITY: NORMAL

## 2018-01-16 PROCEDURE — 93307 TTE W/O DOPPLER COMPLETE: CPT

## 2018-01-16 PROCEDURE — 4040F PNEUMOC VAC/ADMIN/RCVD: CPT | Performed by: COLON & RECTAL SURGERY

## 2018-01-16 PROCEDURE — G8484 FLU IMMUNIZE NO ADMIN: HCPCS | Performed by: COLON & RECTAL SURGERY

## 2018-01-16 PROCEDURE — G8427 DOCREV CUR MEDS BY ELIG CLIN: HCPCS | Performed by: COLON & RECTAL SURGERY

## 2018-01-16 PROCEDURE — 99203 OFFICE O/P NEW LOW 30 MIN: CPT | Performed by: COLON & RECTAL SURGERY

## 2018-01-16 PROCEDURE — G8417 CALC BMI ABV UP PARAM F/U: HCPCS | Performed by: COLON & RECTAL SURGERY

## 2018-01-16 PROCEDURE — 46600 DIAGNOSTIC ANOSCOPY SPX: CPT | Performed by: COLON & RECTAL SURGERY

## 2018-01-16 PROCEDURE — 1090F PRES/ABSN URINE INCON ASSESS: CPT | Performed by: COLON & RECTAL SURGERY

## 2018-01-16 ASSESSMENT — ENCOUNTER SYMPTOMS
ANAL BLEEDING: 1
EYES NEGATIVE: 1
RECTAL PAIN: 1
WHEEZING: 1

## 2018-01-16 NOTE — PROGRESS NOTES
3    citalopram (CELEXA) 20 MG tablet Take 1 tablet by mouth daily (Patient taking differently: Take 20 mg by mouth daily Take 1 tablet daily) 30 tablet 11    melatonin 3 MG TABS tablet Take 1 tablet by mouth nightly 30 tablet 3    sotalol (BETAPACE) 80 MG tablet Take 1 tablet by mouth 2 times daily (Patient taking differently: Take 80 mg by mouth 2 times daily Take 1 tablet twice daily) 180 tablet 1    Handicap Placard MISC by Does not apply route Dx: A fib  Expires: 2018 1 each 0    Calcium Carbonate-Vitamin D (CALCIUM + D PO) Take 1 tablet by mouth daily      Compression Stockings MISC by Does not apply route. Knee high 1 each 0    atenolol (TENORMIN) 50 MG tablet Take 25 mg by mouth daily Take 1 tablet daily      atorvastatin (LIPITOR) 10 MG tablet Take 10 mg by mouth daily At bedtime      warfarin (COUMADIN) 2.5 MG tablet Take 2.5 mg by mouth Take one tab every Saturday and Sunday. Take 1/2 tablet Monday through Friday      aspirin 81 MG tablet Take 81 mg by mouth daily.  furosemide (LASIX) 20 MG tablet Take 1 tablet by mouth daily for 10 days 10 tablet 0     No current facility-administered medications on file prior to visit. Allergies:  Review of patient's allergies indicates no known allergies. Review of Systems:    Review of Systems   Constitutional: Negative. HENT: Positive for hearing loss. Eyes: Negative. Respiratory: Positive for wheezing. Cardiovascular: Positive for palpitations. Gastrointestinal: Positive for anal bleeding and rectal pain. Endocrine: Negative. Genitourinary: Negative. Musculoskeletal: Negative. Skin: Negative. Neurological: Positive for dizziness. Hematological: Bruises/bleeds easily. Psychiatric/Behavioral: Positive for confusion. Physical Exam:    Physical Exam   Constitutional: She appears well-developed and well-nourished. HENT:   Head: Normocephalic and atraumatic.    Right Ear: External ear normal.   Left Ear:

## 2018-01-17 ENCOUNTER — TELEPHONE (OUTPATIENT)
Dept: SURGERY | Age: 83
End: 2018-01-17

## 2018-01-17 NOTE — TELEPHONE ENCOUNTER
Called pt son  Talk  To  pt son about  pt having surgery on 1/25/18   Pt will need to get pat on 1/22/18 at 12 pm  Pt need only enemas   Pt needs clearance from heart doctor.

## 2018-01-22 ENCOUNTER — HOSPITAL ENCOUNTER (OUTPATIENT)
Dept: PREADMISSION TESTING | Age: 83
Discharge: HOME | End: 2018-01-22
Payer: MEDICARE

## 2018-01-22 VITALS
WEIGHT: 162 LBS | OXYGEN SATURATION: 98 % | RESPIRATION RATE: 16 BRPM | SYSTOLIC BLOOD PRESSURE: 138 MMHG | HEART RATE: 55 BPM | DIASTOLIC BLOOD PRESSURE: 70 MMHG | BODY MASS INDEX: 31.8 KG/M2 | TEMPERATURE: 98.3 F | HEIGHT: 60 IN

## 2018-01-22 PROBLEM — K64.9 HEMORRHOIDS: Status: ACTIVE | Noted: 2018-01-22

## 2018-01-22 RX ORDER — SODIUM CHLORIDE 0.9 % (FLUSH) 0.9 %
10 SYRINGE (ML) INJECTION PRN
Status: CANCELLED | OUTPATIENT
Start: 2018-01-22

## 2018-01-22 RX ORDER — SODIUM CHLORIDE 0.9 % (FLUSH) 0.9 %
10 SYRINGE (ML) INJECTION EVERY 12 HOURS SCHEDULED
Status: CANCELLED | OUTPATIENT
Start: 2018-01-22

## 2018-01-22 RX ORDER — LIDOCAINE HYDROCHLORIDE 10 MG/ML
1 INJECTION, SOLUTION EPIDURAL; INFILTRATION; INTRACAUDAL; PERINEURAL
Status: CANCELLED | OUTPATIENT
Start: 2018-01-22 | End: 2018-01-22

## 2018-01-22 RX ORDER — SODIUM CHLORIDE, SODIUM LACTATE, POTASSIUM CHLORIDE, CALCIUM CHLORIDE 600; 310; 30; 20 MG/100ML; MG/100ML; MG/100ML; MG/100ML
INJECTION, SOLUTION INTRAVENOUS CONTINUOUS
Status: CANCELLED | OUTPATIENT
Start: 2018-01-22

## 2018-01-22 ASSESSMENT — ENCOUNTER SYMPTOMS
VOMITING: 0
SORE THROAT: 0
DIARRHEA: 0
BACK PAIN: 1
WHEEZING: 0
NAUSEA: 0
CONSTIPATION: 0
COUGH: 0
ABDOMINAL PAIN: 0
SHORTNESS OF BREATH: 0
EYES NEGATIVE: 1
HEARTBURN: 0
STRIDOR: 0

## 2018-01-22 NOTE — PROGRESS NOTES
EKG done 9/5/2017 & on Epic. [facility] appt notes done 1/18/2018 not yet typed / have requested to be sent to [facility]. Patient accompanied by caretaker / son Radha Berg is power of  but not present at [facility] appt so consent form for OR not signed. Reviewed enema order with caretaker. [employer] OR date has changed from 1/25/2018. Son to clarify when patient to stop Coumadin &/OR to continue Aspirin. CBC & CMP done 1/18/2018 & on Epic.
Monitoring Suppl (FREESTYLE FREEDOM) KIT Test once daily. DX: E11.8 1 kit 0    Handicap Placard MISC by Does not apply route Dx: A fib  Expires: 2018 1 each 0    Calcium Carbonate-Vitamin D (CALCIUM + D PO) Take 1 tablet by mouth daily      Compression Stockings MISC by Does not apply route. Knee high 1 each 0    warfarin (COUMADIN) 2.5 MG tablet Take 2.5 mg by mouth Take one tab every Saturday and Sunday. Take 1/2 tablet Monday through Friday      aspirin 81 MG tablet Take 81 mg by mouth daily. No current facility-administered medications for this encounter. No Known Allergies  Principal Problem:    Hemorrhoids    Blood pressure 138/70, pulse 55, temperature 98.3 °F (36.8 °C), temperature source Temporal, resp. rate 16, height 5' (1.524 m), weight 162 lb (73.5 kg), SpO2 98 %.     Subjective  Objective  Assessment & Plan    Michael Shahid NP  1/22/2018

## 2018-01-22 NOTE — H&P
Gabrielle Weller is an 80 y.o.  female. Patient presents with caretaker for OR / hemorrhoidectomy. Per caretaker has had bright red bleeding from hemorrhoids > 2-3 months but of recent bleeding has become worse. Past Medical History:   Diagnosis Date    Atrial fibrillation ([address]     Cancer ([facility] 2010    breast left    Depression     Diabetes mellitus ([address]     Diabetes mellitus ([address]     Diverticular disease 2004    ct pelvis    DJD (degenerative joint disease) of knee     both    GERD (gastroesophageal reflux disease)     Hernia, hiatal     egd    High blood pressure     Hyperlipidemia     Hypertension     Insomnia     Osteopenia 2003    Pancreas cyst 2007       Allergies: No Known Allergies    Principal Problem:    Hemorrhoids    Blood pressure 138/70, pulse 55, temperature 98.3 °F (36.8 °C), temperature source Temporal, resp. rate 16, height 5' (1.524 m), weight 162 lb (73.5 kg), SpO2 98 %. Review of Systems   Constitutional: Negative. Negative for chills and fever. HENT: Negative for sore throat. Eyes: Negative. Respiratory: Negative for cough, shortness of breath, wheezing and stridor. Cardiovascular: Positive for leg swelling (bilateral ankle swelling). Negative for chest pain and palpitations. Gastrointestinal: Negative for abdominal pain, constipation, diarrhea, heartburn, nausea and vomiting. Bleeding hemorrhoids    Genitourinary: Negative for dysuria and frequency. Occas incontinence   Musculoskeletal: Positive for back pain (c/o lower back pain). Negative for myalgias and neck pain. Skin: Negative. Neurological: Positive for dizziness. Negative for seizures, loss of consciousness and headaches. Endo/Heme/Allergies: Negative. Psychiatric/Behavioral: Negative. Physical Exam   Constitutional: She appears well-developed and well-nourished. HENT:   Head: Normocephalic and atraumatic. Mouth/Throat: No oropharyngeal exudate.    Upper full

## 2018-01-29 ENCOUNTER — CARE COORDINATION (OUTPATIENT)
Dept: CARE COORDINATION | Age: 83
End: 2018-01-29

## 2018-01-31 RX ORDER — SODIUM CHLORIDE, SODIUM LACTATE, POTASSIUM CHLORIDE, CALCIUM CHLORIDE 600; 310; 30; 20 MG/100ML; MG/100ML; MG/100ML; MG/100ML
INJECTION, SOLUTION INTRAVENOUS CONTINUOUS
Status: CANCELLED | OUTPATIENT
Start: 2018-01-31

## 2018-01-31 RX ORDER — SODIUM CHLORIDE 0.9 % (FLUSH) 0.9 %
10 SYRINGE (ML) INJECTION EVERY 12 HOURS SCHEDULED
Status: CANCELLED | OUTPATIENT
Start: 2018-01-31

## 2018-01-31 RX ORDER — SODIUM CHLORIDE 0.9 % (FLUSH) 0.9 %
10 SYRINGE (ML) INJECTION PRN
Status: CANCELLED | OUTPATIENT
Start: 2018-01-31

## 2018-01-31 RX ORDER — LIDOCAINE HYDROCHLORIDE 10 MG/ML
1 INJECTION, SOLUTION EPIDURAL; INFILTRATION; INTRACAUDAL; PERINEURAL
Status: CANCELLED | OUTPATIENT
Start: 2018-01-31 | End: 2018-01-31

## 2018-02-01 ENCOUNTER — ANESTHESIA (OUTPATIENT)
Dept: OPERATING ROOM | Age: 83
End: 2018-02-01
Payer: MEDICARE

## 2018-02-01 ENCOUNTER — ANESTHESIA EVENT (OUTPATIENT)
Dept: OPERATING ROOM | Age: 83
End: 2018-02-01
Payer: MEDICARE

## 2018-02-01 ENCOUNTER — HOSPITAL ENCOUNTER (OUTPATIENT)
Age: 83
Setting detail: OUTPATIENT SURGERY
Discharge: HOME | End: 2018-02-01
Attending: COLON & RECTAL SURGERY | Admitting: COLON & RECTAL SURGERY
Payer: MEDICARE

## 2018-02-01 VITALS
OXYGEN SATURATION: 99 % | HEART RATE: 50 BPM | WEIGHT: 162 LBS | HEIGHT: 60 IN | BODY MASS INDEX: 31.8 KG/M2 | SYSTOLIC BLOOD PRESSURE: 150 MMHG | DIASTOLIC BLOOD PRESSURE: 70 MMHG | TEMPERATURE: 99.1 F | RESPIRATION RATE: 18 BRPM

## 2018-02-01 VITALS
SYSTOLIC BLOOD PRESSURE: 163 MMHG | RESPIRATION RATE: 20 BRPM | OXYGEN SATURATION: 98 % | DIASTOLIC BLOOD PRESSURE: 79 MMHG

## 2018-02-01 DIAGNOSIS — K64.1 GRADE II HEMORRHOIDS: Primary | ICD-10-CM

## 2018-02-01 LAB
GLUCOSE BLD-MCNC: 149 MG/DL (ref 60–115)
GLUCOSE BLD-MCNC: 182 MG/DL (ref 60–115)
INR BLD: 1
PERFORMED ON: ABNORMAL
PERFORMED ON: ABNORMAL
PROTHROMBIN TIME: 10.9 SEC (ref 8.1–13.7)

## 2018-02-01 PROCEDURE — 85610 PROTHROMBIN TIME: CPT

## 2018-02-01 PROCEDURE — 7100000000 HC PACU RECOVERY - FIRST 15 MIN: Performed by: COLON & RECTAL SURGERY

## 2018-02-01 PROCEDURE — 3700000000 HC ANESTHESIA ATTENDED CARE: Performed by: COLON & RECTAL SURGERY

## 2018-02-01 PROCEDURE — 3600000002 HC SURGERY LEVEL 2 BASE: Performed by: COLON & RECTAL SURGERY

## 2018-02-01 PROCEDURE — 7100000001 HC PACU RECOVERY - ADDTL 15 MIN: Performed by: COLON & RECTAL SURGERY

## 2018-02-01 PROCEDURE — 7100000010 HC PHASE II RECOVERY - FIRST 15 MIN: Performed by: COLON & RECTAL SURGERY

## 2018-02-01 PROCEDURE — 6360000002 HC RX W HCPCS

## 2018-02-01 PROCEDURE — A6402 STERILE GAUZE <= 16 SQ IN: HCPCS | Performed by: COLON & RECTAL SURGERY

## 2018-02-01 PROCEDURE — 2580000003 HC RX 258: Performed by: STUDENT IN AN ORGANIZED HEALTH CARE EDUCATION/TRAINING PROGRAM

## 2018-02-01 PROCEDURE — 3700000001 HC ADD 15 MINUTES (ANESTHESIA): Performed by: COLON & RECTAL SURGERY

## 2018-02-01 PROCEDURE — 2500000003 HC RX 250 WO HCPCS: Performed by: NURSE ANESTHETIST, CERTIFIED REGISTERED

## 2018-02-01 PROCEDURE — 2580000003 HC RX 258: Performed by: COLON & RECTAL SURGERY

## 2018-02-01 PROCEDURE — 6360000002 HC RX W HCPCS: Performed by: NURSE ANESTHETIST, CERTIFIED REGISTERED

## 2018-02-01 PROCEDURE — 2500000003 HC RX 250 WO HCPCS: Performed by: COLON & RECTAL SURGERY

## 2018-02-01 PROCEDURE — 3600000012 HC SURGERY LEVEL 2 ADDTL 15MIN: Performed by: COLON & RECTAL SURGERY

## 2018-02-01 PROCEDURE — 7100000011 HC PHASE II RECOVERY - ADDTL 15 MIN: Performed by: COLON & RECTAL SURGERY

## 2018-02-01 PROCEDURE — 88304 TISSUE EXAM BY PATHOLOGIST: CPT

## 2018-02-01 PROCEDURE — 6370000000 HC RX 637 (ALT 250 FOR IP): Performed by: ANESTHESIOLOGY

## 2018-02-01 PROCEDURE — 6360000002 HC RX W HCPCS: Performed by: ANESTHESIOLOGY

## 2018-02-01 RX ORDER — LIDOCAINE HYDROCHLORIDE 20 MG/ML
INJECTION, SOLUTION INFILTRATION; PERINEURAL PRN
Status: DISCONTINUED | OUTPATIENT
Start: 2018-02-01 | End: 2018-02-01 | Stop reason: SDUPTHER

## 2018-02-01 RX ORDER — PROPOFOL 10 MG/ML
INJECTION, EMULSION INTRAVENOUS PRN
Status: DISCONTINUED | OUTPATIENT
Start: 2018-02-01 | End: 2018-02-01 | Stop reason: SDUPTHER

## 2018-02-01 RX ORDER — METOCLOPRAMIDE HYDROCHLORIDE 5 MG/ML
10 INJECTION INTRAMUSCULAR; INTRAVENOUS
Status: DISCONTINUED | OUTPATIENT
Start: 2018-02-01 | End: 2018-02-01 | Stop reason: HOSPADM

## 2018-02-01 RX ORDER — HYDROCODONE BITARTRATE AND ACETAMINOPHEN 5; 325 MG/1; MG/1
1 TABLET ORAL PRN
Status: COMPLETED | OUTPATIENT
Start: 2018-02-01 | End: 2018-02-01

## 2018-02-01 RX ORDER — ONDANSETRON 2 MG/ML
4 INJECTION INTRAMUSCULAR; INTRAVENOUS
Status: DISCONTINUED | OUTPATIENT
Start: 2018-02-01 | End: 2018-02-01 | Stop reason: HOSPADM

## 2018-02-01 RX ORDER — OXYCODONE HYDROCHLORIDE AND ACETAMINOPHEN 5; 325 MG/1; MG/1
1 TABLET ORAL EVERY 4 HOURS PRN
Qty: 45 TABLET | Refills: 0 | Status: SHIPPED | OUTPATIENT
Start: 2018-02-01 | End: 2018-02-12

## 2018-02-01 RX ORDER — PROPOFOL 10 MG/ML
INJECTION, EMULSION INTRAVENOUS CONTINUOUS PRN
Status: DISCONTINUED | OUTPATIENT
Start: 2018-02-01 | End: 2018-02-01 | Stop reason: SDUPTHER

## 2018-02-01 RX ORDER — MAGNESIUM HYDROXIDE 1200 MG/15ML
LIQUID ORAL CONTINUOUS PRN
Status: DISCONTINUED | OUTPATIENT
Start: 2018-02-01 | End: 2018-02-01 | Stop reason: HOSPADM

## 2018-02-01 RX ORDER — SODIUM CHLORIDE, SODIUM LACTATE, POTASSIUM CHLORIDE, CALCIUM CHLORIDE 600; 310; 30; 20 MG/100ML; MG/100ML; MG/100ML; MG/100ML
INJECTION, SOLUTION INTRAVENOUS
Status: DISCONTINUED
Start: 2018-02-01 | End: 2018-02-01 | Stop reason: HOSPADM

## 2018-02-01 RX ORDER — LIDOCAINE HYDROCHLORIDE 10 MG/ML
INJECTION, SOLUTION EPIDURAL; INFILTRATION; INTRACAUDAL; PERINEURAL
Status: DISCONTINUED
Start: 2018-02-01 | End: 2018-02-01 | Stop reason: HOSPADM

## 2018-02-01 RX ORDER — HYDROCODONE BITARTRATE AND ACETAMINOPHEN 5; 325 MG/1; MG/1
2 TABLET ORAL PRN
Status: COMPLETED | OUTPATIENT
Start: 2018-02-01 | End: 2018-02-01

## 2018-02-01 RX ORDER — SODIUM CHLORIDE 0.9 % (FLUSH) 0.9 %
10 SYRINGE (ML) INJECTION PRN
Status: DISCONTINUED | OUTPATIENT
Start: 2018-02-01 | End: 2018-02-01 | Stop reason: HOSPADM

## 2018-02-01 RX ORDER — FENTANYL CITRATE 50 UG/ML
50 INJECTION, SOLUTION INTRAMUSCULAR; INTRAVENOUS EVERY 10 MIN PRN
Status: DISCONTINUED | OUTPATIENT
Start: 2018-02-01 | End: 2018-02-01 | Stop reason: HOSPADM

## 2018-02-01 RX ORDER — SODIUM CHLORIDE 9 MG/ML
INJECTION, SOLUTION INTRAVENOUS CONTINUOUS
Status: DISCONTINUED | OUTPATIENT
Start: 2018-02-01 | End: 2018-02-01 | Stop reason: HOSPADM

## 2018-02-01 RX ORDER — MEPERIDINE HYDROCHLORIDE 25 MG/ML
12.5 INJECTION INTRAMUSCULAR; INTRAVENOUS; SUBCUTANEOUS EVERY 5 MIN PRN
Status: DISCONTINUED | OUTPATIENT
Start: 2018-02-01 | End: 2018-02-01 | Stop reason: HOSPADM

## 2018-02-01 RX ORDER — LIDOCAINE HYDROCHLORIDE AND EPINEPHRINE 10; 10 MG/ML; UG/ML
INJECTION, SOLUTION INFILTRATION; PERINEURAL PRN
Status: DISCONTINUED | OUTPATIENT
Start: 2018-02-01 | End: 2018-02-01 | Stop reason: HOSPADM

## 2018-02-01 RX ORDER — SODIUM CHLORIDE, SODIUM LACTATE, POTASSIUM CHLORIDE, CALCIUM CHLORIDE 600; 310; 30; 20 MG/100ML; MG/100ML; MG/100ML; MG/100ML
INJECTION, SOLUTION INTRAVENOUS CONTINUOUS
Status: DISCONTINUED | OUTPATIENT
Start: 2018-02-01 | End: 2018-02-01 | Stop reason: HOSPADM

## 2018-02-01 RX ORDER — DIPHENHYDRAMINE HYDROCHLORIDE 50 MG/ML
12.5 INJECTION INTRAMUSCULAR; INTRAVENOUS
Status: DISCONTINUED | OUTPATIENT
Start: 2018-02-01 | End: 2018-02-01 | Stop reason: HOSPADM

## 2018-02-01 RX ORDER — LIDOCAINE HYDROCHLORIDE 10 MG/ML
1 INJECTION, SOLUTION EPIDURAL; INFILTRATION; INTRACAUDAL; PERINEURAL
Status: DISCONTINUED | OUTPATIENT
Start: 2018-02-01 | End: 2018-02-01 | Stop reason: HOSPADM

## 2018-02-01 RX ORDER — SODIUM CHLORIDE 0.9 % (FLUSH) 0.9 %
10 SYRINGE (ML) INJECTION EVERY 12 HOURS SCHEDULED
Status: DISCONTINUED | OUTPATIENT
Start: 2018-02-01 | End: 2018-02-01 | Stop reason: HOSPADM

## 2018-02-01 RX ADMIN — PROPOFOL 50 MG: 10 INJECTION, EMULSION INTRAVENOUS at 08:33

## 2018-02-01 RX ADMIN — PROPOFOL 140 MCG/KG/MIN: 10 INJECTION, EMULSION INTRAVENOUS at 08:33

## 2018-02-01 RX ADMIN — FENTANYL CITRATE 50 MCG: 50 INJECTION, SOLUTION INTRAMUSCULAR; INTRAVENOUS at 09:50

## 2018-02-01 RX ADMIN — FENTANYL CITRATE 50 MCG: 50 INJECTION, SOLUTION INTRAMUSCULAR; INTRAVENOUS at 10:00

## 2018-02-01 RX ADMIN — HYDROCODONE BITARTRATE AND ACETAMINOPHEN 1 TABLET: 5; 325 TABLET ORAL at 11:12

## 2018-02-01 RX ADMIN — LIDOCAINE HYDROCHLORIDE 60 MG: 20 INJECTION, SOLUTION INFILTRATION; PERINEURAL at 08:33

## 2018-02-01 RX ADMIN — SODIUM CHLORIDE, POTASSIUM CHLORIDE, SODIUM LACTATE AND CALCIUM CHLORIDE: 600; 310; 30; 20 INJECTION, SOLUTION INTRAVENOUS at 07:27

## 2018-02-01 ASSESSMENT — PULMONARY FUNCTION TESTS
PIF_VALUE: 0
PIF_VALUE: 1
PIF_VALUE: 0
PIF_VALUE: 1
PIF_VALUE: 0
PIF_VALUE: 1
PIF_VALUE: 0

## 2018-02-01 ASSESSMENT — PAIN SCALES - GENERAL
PAINLEVEL_OUTOF10: 10
PAINLEVEL_OUTOF10: 10
PAINLEVEL_OUTOF10: 3

## 2018-02-01 NOTE — ANESTHESIA PRE PROCEDURE
2/6/17   Inocente South MD   citalopram (CELEXA) 20 MG tablet Take 1 tablet by mouth daily  Patient taking differently: Take 20 mg by mouth daily Take 1 tablet daily 2/2/17   Inocente South MD   melatonin 3 MG TABS tablet Take 1 tablet by mouth nightly 2/2/17   Inocente South MD   sotalol (BETAPACE) 80 MG tablet Take 1 tablet by mouth 2 times daily  Patient taking differently: Take 80 mg by mouth 2 times daily Take 1 tablet twice daily 7/1/16   MERCEDES Ma   Handicap Placard MISC by Does not apply route Dx: A fib  Expires: 2018 7/8/15   Pradeep Henry DO   Calcium Carbonate-Vitamin D (CALCIUM + D PO) Take 1 tablet by mouth daily    Historical Provider, MD   Compression Stockings MISC by Does not apply route. Knee high 12/31/14   MERCEDES Carter   atenolol (TENORMIN) 50 MG tablet Take 25 mg by mouth daily Take 1 tablet daily    Historical Provider, MD   atorvastatin (LIPITOR) 10 MG tablet Take 10 mg by mouth daily At bedtime    Historical Provider, MD   warfarin (COUMADIN) 2.5 MG tablet Take 2.5 mg by mouth Take one tab every Saturday and Sunday. Take 1/2 tablet Monday through Friday 2/7/17   Historical Provider, MD   aspirin 81 MG tablet Take 81 mg by mouth daily.     Historical Provider, MD       Current medications:    Current Facility-Administered Medications   Medication Dose Route Frequency Provider Last Rate Last Dose    lidocaine PF 1 % injection             lactated ringers infusion             lactated ringers infusion   Intravenous Continuous Aaron Nguyen,         lidocaine PF 1 % injection 1 mL  1 mL Intradermal Once PRN Carlos Kowalski, DO        sodium chloride flush 0.9 % injection 10 mL  10 mL Intravenous 2 times per day Carlos Dex, DO        sodium chloride flush 0.9 % injection 10 mL  10 mL Intravenous PRN Carlos Kowalski, DO        fentaNYL (SUBLIMAZE) injection 50 mcg  50 mcg Intravenous Q10 Min PRN Arianna Hollis MD        HYDROmorphone BILITOT 0.3 01/11/2018    ALKPHOS 73 01/11/2018    AST 25 01/11/2018    ALT 18 01/11/2018       POC Tests: No results for input(s): POCGLU, POCNA, POCK, POCCL, POCBUN, POCHEMO, POCHCT in the last 72 hours. Coags:   Lab Results   Component Value Date    PROTIME 9.8 06/21/2014    INR 2.5 01/05/2018    INR 0.9 06/21/2014    APTT 21.6 06/21/2014       HCG (If Applicable): No results found for: PREGTESTUR, PREGSERUM, HCG, HCGQUANT     ABGs: No results found for: PHART, PO2ART, ISK9DSH, FRE9QLI, BEART, S5PJSUKK     Type & Screen (If Applicable):  No results found for: LABABO, [address]    Anesthesia Evaluation  Patient summary reviewed and Nursing notes reviewed no history of anesthetic complications:   Airway: Mallampati: II  TM distance: >3 FB   Neck ROM: full  Mouth opening: > = 3 FB Dental: normal exam         Pulmonary:Negative Pulmonary ROS and normal exam                               Cardiovascular:    (+) hypertension: no interval change,       ECG reviewed                        Neuro/Psych:   (+) neuromuscular disease:, psychiatric history: stable with treatment            GI/Hepatic/Renal:   (+) hiatal hernia,           Endo/Other:    (+) Type II DM, , .                 Abdominal:   (+) obese,         Vascular: negative vascular ROS. Anesthesia Plan      MAC     ASA 4             Anesthetic plan and risks discussed with patient. Plan discussed with CRNA.     Attending anesthesiologist reviewed and agrees with Pre Eval content              Agatha Newman MD   2/1/2018

## 2018-02-01 NOTE — BRIEF OP NOTE
Brief Postoperative Note  ______________________________________________________________    Patient: Alea Harvey  YOB: 1934  MRN: [id number]  Date of Procedure: 2/1/2018    Pre-Op Diagnosis: HEMORRHOIDS     Post-Op Diagnosis: Same       Procedure(s):   HEMORRHOIDECTOMY    Anesthesia: Monitor Anesthesia Care    Surgeon(s):  Christina Rodriguez MD    Staff:  Scrub Person First: Key Harrington     Estimated Blood Loss: * No values recorded between 2/1/2018  8:26 AM and 2/1/2018  9:26 AM * None    Complications: None    Specimens:   ID Type Source Tests Collected by Time Destination   A : LEFT LATERAL QUADRANT HEMORRHOID Tissue Anus SURGICAL PATHOLOGY Christina Rodriguez MD 2/1/2018 6058    B : RIGHT ANTERIOR-LATERAL HEMORRHIOD Tissue Anus SURGICAL PATHOLOGY Christina Rodriguez MD 2/1/2018 1708        Implants:  * No implants in log *      Drains:      Findings: Hemorrhoids      Christina Rodriguez MD  Date: 2/1/2018  Time: 9:26 AM

## 2018-02-04 DIAGNOSIS — F03.90 DEMENTIA WITHOUT BEHAVIORAL DISTURBANCE, UNSPECIFIED DEMENTIA TYPE: ICD-10-CM

## 2018-02-05 DIAGNOSIS — F32.A ANXIETY AND DEPRESSION: ICD-10-CM

## 2018-02-05 DIAGNOSIS — F41.9 ANXIETY AND DEPRESSION: ICD-10-CM

## 2018-02-05 RX ORDER — AMLODIPINE BESYLATE 5 MG/1
TABLET ORAL
Qty: 30 TABLET | Refills: 5 | Status: SHIPPED | OUTPATIENT
Start: 2018-02-05 | End: 2018-08-01 | Stop reason: SDUPTHER

## 2018-02-05 RX ORDER — OLANZAPINE 5 MG/1
TABLET ORAL
Qty: 15 TABLET | Refills: 0 | Status: SHIPPED | OUTPATIENT
Start: 2018-02-05 | End: 2018-08-14 | Stop reason: ALTCHOICE

## 2018-02-05 RX ORDER — DONEPEZIL HYDROCHLORIDE 5 MG/1
TABLET, FILM COATED ORAL
Qty: 90 TABLET | Refills: 3 | Status: SHIPPED | OUTPATIENT
Start: 2018-02-05 | End: 2018-06-11 | Stop reason: SDUPTHER

## 2018-02-05 RX ORDER — CITALOPRAM 20 MG/1
TABLET ORAL
Qty: 30 TABLET | Refills: 11 | Status: SHIPPED | OUTPATIENT
Start: 2018-02-05 | End: 2018-08-14 | Stop reason: ALTCHOICE

## 2018-02-05 RX ORDER — OMEPRAZOLE 20 MG/1
CAPSULE, DELAYED RELEASE ORAL
Qty: 90 CAPSULE | Refills: 3 | Status: SHIPPED | OUTPATIENT
Start: 2018-02-05 | End: 2019-01-30 | Stop reason: SDUPTHER

## 2018-02-12 ENCOUNTER — NURSE ONLY (OUTPATIENT)
Dept: FAMILY MEDICINE CLINIC | Age: 83
End: 2018-02-12
Payer: MEDICARE

## 2018-02-12 ENCOUNTER — CARE COORDINATION (OUTPATIENT)
Dept: CARE COORDINATION | Age: 83
End: 2018-02-12

## 2018-02-12 ENCOUNTER — HOSPITAL ENCOUNTER (OUTPATIENT)
Age: 83
Setting detail: SPECIMEN
Discharge: HOME | End: 2018-02-12
Payer: MEDICARE

## 2018-02-12 DIAGNOSIS — I48.91 ATRIAL FIBRILLATION, UNSPECIFIED TYPE (HCC): Primary | ICD-10-CM

## 2018-02-12 DIAGNOSIS — I48.91 ATRIAL FIBRILLATION, UNSPECIFIED TYPE (HCC): ICD-10-CM

## 2018-02-12 DIAGNOSIS — N28.9 FUNCTION KIDNEY DECREASED: ICD-10-CM

## 2018-02-12 DIAGNOSIS — I10 HYPERTENSION, UNSPECIFIED TYPE: Primary | ICD-10-CM

## 2018-02-12 DIAGNOSIS — E11.9 CONTROLLED TYPE 2 DIABETES MELLITUS WITHOUT COMPLICATION, WITHOUT LONG-TERM CURRENT USE OF INSULIN (HCC): ICD-10-CM

## 2018-02-12 DIAGNOSIS — F03.90 DEMENTIA WITHOUT BEHAVIORAL DISTURBANCE, UNSPECIFIED DEMENTIA TYPE: ICD-10-CM

## 2018-02-12 LAB
ANION GAP SERPL CALCULATED.3IONS-SCNC: 20 MEQ/L (ref 7–13)
BUN BLDV-MCNC: 11 MG/DL (ref 8–23)
CALCIUM SERPL-MCNC: 9.1 MG/DL (ref 8.6–10.2)
CHLORIDE BLD-SCNC: 99 MEQ/L (ref 98–107)
CO2: 24 MEQ/L (ref 22–29)
CREAT SERPL-MCNC: 1.38 MG/DL (ref 0.5–0.9)
GFR AFRICAN AMERICAN: 44.1
GFR NON-AFRICAN AMERICAN: 36.5
GLUCOSE BLD-MCNC: 101 MG/DL (ref 74–109)
INTERNATIONAL NORMALIZATION RATIO, POC: 1.3
POTASSIUM SERPL-SCNC: 4.9 MEQ/L (ref 3.5–5.1)
PROTHROMBIN TIME, POC: ABNORMAL
SODIUM BLD-SCNC: 143 MEQ/L (ref 132–144)

## 2018-02-12 PROCEDURE — 80048 BASIC METABOLIC PNL TOTAL CA: CPT

## 2018-02-12 PROCEDURE — 85610 PROTHROMBIN TIME: CPT | Performed by: FAMILY MEDICINE

## 2018-02-12 PROCEDURE — 36415 COLL VENOUS BLD VENIPUNCTURE: CPT | Performed by: FAMILY MEDICINE

## 2018-02-12 RX ORDER — DOCUSATE SODIUM 100 MG/1
100 CAPSULE, LIQUID FILLED ORAL DAILY
COMMUNITY
End: 2020-10-12 | Stop reason: CLARIF

## 2018-02-12 NOTE — PROGRESS NOTES
Patient here today for blood work ordered by Dr. Iain Shafer and INR for [facility].

## 2018-02-15 ENCOUNTER — CARE COORDINATION (OUTPATIENT)
Dept: CARE COORDINATION | Age: 83
End: 2018-02-15

## 2018-02-19 ENCOUNTER — NURSE ONLY (OUTPATIENT)
Dept: FAMILY MEDICINE CLINIC | Age: 83
End: 2018-02-19
Payer: MEDICARE

## 2018-02-19 DIAGNOSIS — I48.91 ATRIAL FIBRILLATION, UNSPECIFIED TYPE (HCC): Primary | ICD-10-CM

## 2018-02-19 LAB
INTERNATIONAL NORMALIZATION RATIO, POC: 3.9
PROTHROMBIN TIME, POC: ABNORMAL

## 2018-02-19 PROCEDURE — 85610 PROTHROMBIN TIME: CPT | Performed by: FAMILY MEDICINE

## 2018-02-21 DIAGNOSIS — E11.8 TYPE 2 DIABETES MELLITUS WITH COMPLICATION, WITHOUT LONG-TERM CURRENT USE OF INSULIN (HCC): ICD-10-CM

## 2018-02-26 ENCOUNTER — NURSE ONLY (OUTPATIENT)
Dept: FAMILY MEDICINE CLINIC | Age: 83
End: 2018-02-26
Payer: MEDICARE

## 2018-02-26 DIAGNOSIS — I10 ESSENTIAL HYPERTENSION: ICD-10-CM

## 2018-02-26 DIAGNOSIS — I48.91 ATRIAL FIBRILLATION, UNSPECIFIED TYPE (HCC): Primary | ICD-10-CM

## 2018-02-26 LAB
INTERNATIONAL NORMALIZATION RATIO, POC: 2.4
PROTHROMBIN TIME, POC: NORMAL

## 2018-02-26 PROCEDURE — 85610 PROTHROMBIN TIME: CPT | Performed by: FAMILY MEDICINE

## 2018-02-26 RX ORDER — ENALAPRIL MALEATE 10 MG/1
TABLET ORAL
Qty: 30 TABLET | Refills: 3 | Status: SHIPPED | OUTPATIENT
Start: 2018-02-26 | End: 2018-06-24 | Stop reason: SDUPTHER

## 2018-02-26 NOTE — TELEPHONE ENCOUNTER
Medication: Vasotec    Last office visit:1/5/2018    Last labs: 1/11/2018    Last filled: 9/11/2017    WM Nelson

## 2018-02-28 ENCOUNTER — TELEPHONE (OUTPATIENT)
Dept: FAMILY MEDICINE CLINIC | Age: 83
End: 2018-02-28

## 2018-02-28 DIAGNOSIS — R30.0 BURNING WITH URINATION: Primary | ICD-10-CM

## 2018-02-28 NOTE — TELEPHONE ENCOUNTER
Pt's home care nurse called requesting an order for an UA. Pt has complaints of burning with urination.  Order created and faxed to the home care office at [phone]

## 2018-03-02 ENCOUNTER — HOSPITAL ENCOUNTER (OUTPATIENT)
Age: 83
Setting detail: SPECIMEN
Discharge: HOME | End: 2018-03-02
Payer: MEDICARE

## 2018-03-02 LAB
BACTERIA: ABNORMAL /HPF
BILIRUBIN URINE: NEGATIVE
BLOOD, URINE: NEGATIVE
CLARITY: ABNORMAL
COLOR: YELLOW
EPITHELIAL CELLS, UA: ABNORMAL /HPF
GLUCOSE URINE: >=1000 MG/DL
KETONES, URINE: NEGATIVE MG/DL
LEUKOCYTE ESTERASE, URINE: ABNORMAL
NITRITE, URINE: NEGATIVE
PH UA: 5.5 (ref 5–9)
PROTEIN UA: NEGATIVE MG/DL
RBC UA: ABNORMAL /HPF (ref 0–2)
SPECIFIC GRAVITY UA: 1.03 (ref 1–1.03)
UROBILINOGEN, URINE: 0.2 E.U./DL
WBC UA: ABNORMAL /HPF (ref 0–5)

## 2018-03-02 PROCEDURE — 81001 URINALYSIS AUTO W/SCOPE: CPT

## 2018-03-05 ENCOUNTER — CARE COORDINATION (OUTPATIENT)
Dept: CARE COORDINATION | Age: 83
End: 2018-03-05

## 2018-03-06 DIAGNOSIS — N30.00 ACUTE CYSTITIS WITHOUT HEMATURIA: Primary | ICD-10-CM

## 2018-03-06 RX ORDER — NITROFURANTOIN 25; 75 MG/1; MG/1
100 CAPSULE ORAL 2 TIMES DAILY
Qty: 14 CAPSULE | Refills: 0 | Status: SHIPPED | OUTPATIENT
Start: 2018-03-06 | End: 2018-03-13

## 2018-03-07 ENCOUNTER — TELEPHONE (OUTPATIENT)
Dept: FAMILY MEDICINE CLINIC | Age: 83
End: 2018-03-07

## 2018-03-12 ENCOUNTER — CARE COORDINATION (OUTPATIENT)
Dept: CARE COORDINATION | Age: 83
End: 2018-03-12

## 2018-03-13 ENCOUNTER — CARE COORDINATION (OUTPATIENT)
Dept: CARE COORDINATION | Age: 83
End: 2018-03-13

## 2018-03-14 NOTE — CARE COORDINATION
Completion Date: 1 month       Self Monitoring   On track (3/13/2018)             Self-Monitored Blood Glucose - I will check my blood sugar Fasting blood sugar and Other: before dinner or at bedtime and as needed for sx  Blood Pressure - I will take my blood pressure as directed - Daily  I will notify my provider of any changes in blood pressure associated with symptoms of dizziness, falls, passing out, headache, confusion/change in mental status. Caregiver will do monitoring  Son will provide BP monitor for [employer]  Patient Reported Blood Pressure No flowsheet data found. Barriers: lack of education  Plan for overcoming my barriers: Son will provide BP machine, caregivers will read HTN education handouts mailed to home; caregivers will monitor BG daily as scheduled, caregivers will moniotr BP daily    Confidence: 5/10  Anticipated Goal Completion Date: 1 month              Prior to Admission medications    Medication Sig Start Date End Date Taking?  Authorizing Provider   linagliptin (TRADJENTA) 5 MG tablet Take 1 tablet by mouth daily 3/8/18  Yes Juani Maurice MD   enalapril (VASOTEC) 10 MG tablet TAKE ONE TABLET BY MOUTH ONCE DAILY 2/26/18  Yes MERCEDES Masters   docusate sodium (STOOL SOFTENER) 100 MG capsule Take 100 mg by mouth daily    Yes Historical Provider, MD   Psyllium ([employer] NATURAL FIBER SUPPLEMENT PO) Take 1 tablet by mouth daily 2/12/18  Yes Historical Provider, MD   donepezil (ARICEPT) 5 MG tablet TAKE ONE TABLET BY MOUTH ONCE DAILY 2/5/18  Yes Juani Maurice MD   omeprazole (PRILOSEC) 20 MG delayed release capsule TAKE ONE CAPSULE BY MOUTH ONCE DAILY 2/5/18  Yes Juani Maurice MD   OLANZapine (ZYPREXA) 5 MG tablet TAKE ONE TABLET BY MOUTH AT BEDTIME AS NEEDED FOR  AGITATION 2/5/18  Yes Juani Maurice MD   amLODIPine (NORVASC) 5 MG tablet TAKE ONE TABLET BY MOUTH ONCE DAILY 2/5/18  Yes Juani Maurice MD   citalopram (CELEXA) 20 MG tablet TAKE ONE TABLET BY MOUTH ONCE DAILY 2/5/18  Yes Meme Hamilton MD   ferrous sulfate (FE TABS) 325 (65 Fe) MG EC tablet Take 1 tablet by mouth daily (with breakfast) 1/12/18  Yes Meme Hamilton MD   glipiZIDE (GLUCOTROL) 10 MG tablet Take 1 tablet by mouth daily 9/11/17  Yes Meme Hamilton MD   acetaminophen (APAP EXTRA STRENGTH) 500 MG tablet Take 2 tablets by mouth 3 times daily as needed for Pain 7/13/17  Yes Meme Hamilton MD   melatonin 3 MG TABS tablet Take 1 tablet by mouth nightly 2/2/17  Yes Meme Hamilton MD   sotalol (BETAPACE) 80 MG tablet Take 1 tablet by mouth 2 times daily  Patient taking differently: Take 80 mg by mouth 2 times daily Take 1 tablet twice daily 7/1/16  Yes MERCEDES Almendarez   Calcium Carbonate-Vitamin D (CALCIUM + D PO) Take 1 tablet by mouth daily   Yes Historical Provider, MD   atorvastatin (LIPITOR) 10 MG tablet Take 10 mg by mouth daily At bedtime   Yes Historical Provider, MD   aspirin 81 MG tablet Take 81 mg by mouth daily. Yes Historical Provider, MD   furosemide (LASIX) 20 MG tablet Take 1 tablet by mouth daily for 10 days 1/5/18 1/15/18  Meme Hamilton MD   Blood Glucose Monitoring Suppl TROY Use TID 6/14/17   Meme Hamilton MD   Glucose Blood (BLOOD GLUCOSE TEST STRIPS) STRP Use TID 6/14/17   Meme Hamilton MD   Lancets MISC 1 each by Does not apply route 3 times daily 6/14/17   Meme Hamilton MD   Blood Glucose Monitoring Suppl (FREESTYLE FREEDOM) KIT Test once daily. DX: E11.8 4/5/17   Meme Hamilton MD   Handicap Placard MISC by Does not apply route Dx: A fib  Expires: 2018 7/8/15   Shellie Farmer,    Compression Stockings MISC by Does not apply route.  Knee high 12/31/14   MERCEDES Almendarez   atenolol (TENORMIN) 50 MG tablet Take 25 mg by mouth daily Take 1 tablet daily    Historical Provider, MD       Future Appointments  Date Time Provider Sofia Ramírez   5/14/2018 2:00 PM SCHEDULE, [facility] [place]  [facility]     ,   Diabetes Assessment    Medic Alert ID:  No  Meal Planning:  Avoidance

## 2018-03-23 ENCOUNTER — TELEPHONE (OUTPATIENT)
Dept: FAMILY MEDICINE CLINIC | Age: 83
End: 2018-03-23

## 2018-03-23 ENCOUNTER — OFFICE VISIT (OUTPATIENT)
Dept: FAMILY MEDICINE CLINIC | Age: 83
End: 2018-03-23
Payer: MEDICARE

## 2018-03-23 VITALS
TEMPERATURE: 98.5 F | HEART RATE: 62 BPM | OXYGEN SATURATION: 96 % | SYSTOLIC BLOOD PRESSURE: 136 MMHG | WEIGHT: 150.2 LBS | BODY MASS INDEX: 29.33 KG/M2 | DIASTOLIC BLOOD PRESSURE: 70 MMHG

## 2018-03-23 DIAGNOSIS — N39.0 URINARY TRACT INFECTION WITHOUT HEMATURIA, SITE UNSPECIFIED: ICD-10-CM

## 2018-03-23 DIAGNOSIS — E11.8 TYPE 2 DIABETES MELLITUS WITH COMPLICATION, WITHOUT LONG-TERM CURRENT USE OF INSULIN (HCC): ICD-10-CM

## 2018-03-23 DIAGNOSIS — N39.0 URINARY TRACT INFECTION WITH HEMATURIA, SITE UNSPECIFIED: Primary | ICD-10-CM

## 2018-03-23 DIAGNOSIS — R41.0 CONFUSED: ICD-10-CM

## 2018-03-23 DIAGNOSIS — F03.90 DEMENTIA WITHOUT BEHAVIORAL DISTURBANCE, UNSPECIFIED DEMENTIA TYPE: Primary | ICD-10-CM

## 2018-03-23 DIAGNOSIS — R31.9 URINARY TRACT INFECTION WITH HEMATURIA, SITE UNSPECIFIED: Primary | ICD-10-CM

## 2018-03-23 PROBLEM — S98.139A: Status: RESOLVED | Noted: 2017-09-07 | Resolved: 2018-03-23

## 2018-03-23 LAB
BILIRUBIN, POC: ABNORMAL
BLOOD URINE, POC: ABNORMAL
CLARITY, POC: YELLOW
COLOR, POC: ABNORMAL
GLUCOSE URINE, POC: ABNORMAL
HBA1C MFR BLD: 7.3 %
KETONES, POC: ABNORMAL
LEUKOCYTE EST, POC: ABNORMAL
NITRITE, POC: ABNORMAL
PH, POC: 5.5
PROTEIN, POC: ABNORMAL
SPECIFIC GRAVITY, POC: 1.02
UROBILINOGEN, POC: ABNORMAL

## 2018-03-23 PROCEDURE — 81003 URINALYSIS AUTO W/O SCOPE: CPT | Performed by: FAMILY MEDICINE

## 2018-03-23 PROCEDURE — 83036 HEMOGLOBIN GLYCOSYLATED A1C: CPT | Performed by: FAMILY MEDICINE

## 2018-03-23 PROCEDURE — G8427 DOCREV CUR MEDS BY ELIG CLIN: HCPCS | Performed by: FAMILY MEDICINE

## 2018-03-23 PROCEDURE — 1090F PRES/ABSN URINE INCON ASSESS: CPT | Performed by: FAMILY MEDICINE

## 2018-03-23 PROCEDURE — 99214 OFFICE O/P EST MOD 30 MIN: CPT | Performed by: FAMILY MEDICINE

## 2018-03-23 PROCEDURE — 1036F TOBACCO NON-USER: CPT | Performed by: FAMILY MEDICINE

## 2018-03-23 PROCEDURE — G8484 FLU IMMUNIZE NO ADMIN: HCPCS | Performed by: FAMILY MEDICINE

## 2018-03-23 PROCEDURE — G8399 PT W/DXA RESULTS DOCUMENT: HCPCS | Performed by: FAMILY MEDICINE

## 2018-03-23 PROCEDURE — G8417 CALC BMI ABV UP PARAM F/U: HCPCS | Performed by: FAMILY MEDICINE

## 2018-03-23 PROCEDURE — 1123F ACP DISCUSS/DSCN MKR DOCD: CPT | Performed by: FAMILY MEDICINE

## 2018-03-23 PROCEDURE — 4040F PNEUMOC VAC/ADMIN/RCVD: CPT | Performed by: FAMILY MEDICINE

## 2018-03-23 RX ORDER — CEPHALEXIN 500 MG/1
500 CAPSULE ORAL 2 TIMES DAILY
Qty: 14 CAPSULE | Refills: 0 | Status: SHIPPED | OUTPATIENT
Start: 2018-03-23 | End: 2018-05-15 | Stop reason: ALTCHOICE

## 2018-03-23 RX ORDER — WARFARIN SODIUM 2.5 MG/1
2.5 TABLET ORAL DAILY
COMMUNITY

## 2018-03-23 RX ORDER — SULFAMETHOXAZOLE AND TRIMETHOPRIM 800; 160 MG/1; MG/1
1 TABLET ORAL 2 TIMES DAILY
Qty: 10 TABLET | Refills: 0 | Status: SHIPPED | OUTPATIENT
Start: 2018-03-23 | End: 2018-03-23 | Stop reason: ALTCHOICE

## 2018-03-23 ASSESSMENT — PATIENT HEALTH QUESTIONNAIRE - PHQ9
2. FEELING DOWN, DEPRESSED OR HOPELESS: 0
SUM OF ALL RESPONSES TO PHQ QUESTIONS 1-9: 0
1. LITTLE INTEREST OR PLEASURE IN DOING THINGS: 0
SUM OF ALL RESPONSES TO PHQ9 QUESTIONS 1 & 2: 0

## 2018-03-23 ASSESSMENT — ENCOUNTER SYMPTOMS
CONSTIPATION: 0
SORE THROAT: 0
WHEEZING: 0
ABDOMINAL PAIN: 0
COUGH: 0
DIARRHEA: 0
RHINORRHEA: 0
SHORTNESS OF BREATH: 0

## 2018-03-23 NOTE — TELEPHONE ENCOUNTER
Urine suggestive of UTI. Bactrim has been sent to the pharmacy. If no improvement in the confusion in a week then, let me know and I will increase the aricept.

## 2018-03-23 NOTE — TELEPHONE ENCOUNTER
ON CALL PAGE received @ [phone]    Odin (son) calling to notify provider that pharmacy declined to fill bactrim sent for UTI due to interaction with coumadin. I sent rx for cephalexin 500 mg BID x 7 days to pharmacy as replacement. Upon reviewing medication list there is no coumadin listed. After clarifying with son patient is on coumadin managed per cardiology office. She currently has 2.5 mg pills at home. I have updated her medication list.     FYI to provider. I would recommend that her medications be reconciled to make sure the current list accurately reflects what she is taking at home.

## 2018-03-23 NOTE — PROGRESS NOTES
[address] [facility] [address] [facility] PRIMARY CARE  [address] [place] [address] [phone]  Dept: [phone]  Dept Fax: [phone]: [phone]   Chief Complaint  Chief Complaint   Patient presents with    Urinary Tract Infection     follow up, pt's son would like to make sure her UTI is gone. Care giver states she has alot of confusion in the morning        HPI:  80 y.o. female who presents for UTI:  (here with home care worker)    Confusion: hx of worsening dementia; takes 5mg aricept along with zyprexa at night and celexa. Has had multiple uti's lately incluiding urines with higher glucose levels. Has been more confused in the morning lately. Eating and drinking well. Sugars can fluctuate up to the 300's. Takes all her meds but eats a lot of fruit. a1c 7.3 today. Confusion has been worse in the mornings. Never complains. Gets confusion regularly but is usually re-directable and responds to zyprexa when needed.      Past Medical History:   Diagnosis Date    Atrial fibrillation ([address]     Cancer ([address] 2010    breast left    Depression     Diabetes mellitus ([address]     Diabetes mellitus ([address]     Diverticular disease 2004    ct pelvis    DJD (degenerative joint disease) of knee     both    GERD (gastroesophageal reflux disease)     Hernia, hiatal     egd    High blood pressure     Hyperlipidemia     Hypertension     Insomnia     Osteopenia 2003    Pancreas cyst 2007     Past Surgical History:   Procedure Laterality Date    APPENDECTOMY  1974    BACK SURGERY  1994    lumbar disc OR    COLONOSCOPY  2006    COSMETIC SURGERY  2011    both eye lids    ENDOSCOPY, COLON, DIAGNOSTIC      EYE SURGERY      cataracts OU    HYSTERECTOMY  1974    KNEE ARTHROSCOPY  4/2007    left    LUMBAR LAMINECTOMY  1994    MASTECTOMY  2010    left    OVARY REMOVAL  1974    only right    AK HEMORRHOIDECTOMY,INT/EXT,1 COLUMN/GROUP N/A 2/1/2018    HEMORRHOIDECTOMY performed by Caryle Gin, MD at [facility]  2002    right    ROTATOR CUFF REPAIR  2003    left    TOE AMPUTATION  1969    left first and second tips removed   [address]     Social History     Social History    Marital status:      Spouse name: N/A    Number of children: N/A    Years of education: N/A     Occupational History    Not on file.      Social History Main Topics    Smoking status: Never Smoker    Smokeless tobacco: Never Used    Alcohol use No    Drug use: No    Sexual activity: Not on file     Other Topics Concern    Not on file     Social History Narrative    No narrative on file     No Known Allergies  Current Outpatient Prescriptions   Medication Sig Dispense Refill    linagliptin (TRADJENTA) 5 MG tablet Take 1 tablet by mouth daily 30 tablet 3    enalapril (VASOTEC) 10 MG tablet TAKE ONE TABLET BY MOUTH ONCE DAILY 30 tablet 3    docusate sodium (STOOL SOFTENER) 100 MG capsule Take 100 mg by mouth daily       Psyllium ([employer] NATURAL FIBER SUPPLEMENT PO) Take 1 tablet by mouth daily      donepezil (ARICEPT) 5 MG tablet TAKE ONE TABLET BY MOUTH ONCE DAILY 90 tablet 3    omeprazole (PRILOSEC) 20 MG delayed release capsule TAKE ONE CAPSULE BY MOUTH ONCE DAILY 90 capsule 3    OLANZapine (ZYPREXA) 5 MG tablet TAKE ONE TABLET BY MOUTH AT BEDTIME AS NEEDED FOR  AGITATION 15 tablet 0    amLODIPine (NORVASC) 5 MG tablet TAKE ONE TABLET BY MOUTH ONCE DAILY 30 tablet 5    citalopram (CELEXA) 20 MG tablet TAKE ONE TABLET BY MOUTH ONCE DAILY 30 tablet 11    ferrous sulfate (FE TABS) 325 (65 Fe) MG EC tablet Take 1 tablet by mouth daily (with breakfast) 90 tablet 1    glipiZIDE (GLUCOTROL) 10 MG tablet Take 1 tablet by mouth daily 60 tablet 3    acetaminophen (APAP EXTRA STRENGTH) 500 MG tablet Take 2 tablets by mouth 3 times daily as needed for Pain 60 tablet 3    Blood Glucose Monitoring Suppl TROY Use TID 1 Device 0  Glucose Blood (BLOOD GLUCOSE TEST STRIPS) STRP Use  strip 11    Lancets MISC 1 each by Does not apply route 3 times daily 100 each 11    Blood Glucose Monitoring Suppl (FREESTYLE FREEDOM) KIT Test once daily. DX: E11.8 1 kit 0    melatonin 3 MG TABS tablet Take 1 tablet by mouth nightly 30 tablet 3    sotalol (BETAPACE) 80 MG tablet Take 1 tablet by mouth 2 times daily (Patient taking differently: Take 80 mg by mouth 2 times daily Take 1 tablet twice daily) 180 tablet 1    Handicap Placard MISC by Does not apply route Dx: A fib  Expires: 2018 1 each 0    Calcium Carbonate-Vitamin D (CALCIUM + D PO) Take 1 tablet by mouth daily      Compression Stockings MISC by Does not apply route. Knee high 1 each 0    atenolol (TENORMIN) 50 MG tablet Take 25 mg by mouth daily Take 1 tablet daily      atorvastatin (LIPITOR) 10 MG tablet Take 10 mg by mouth daily At bedtime      aspirin 81 MG tablet Take 81 mg by mouth daily.  furosemide (LASIX) 20 MG tablet Take 1 tablet by mouth daily for 10 days 10 tablet 0     No current facility-administered medications for this visit. ROS:  Review of Systems   Constitutional: Negative for chills and fever. HENT: Negative for rhinorrhea and sore throat. Respiratory: Negative for cough, shortness of breath and wheezing. Gastrointestinal: Negative for abdominal pain, constipation and diarrhea. Endocrine: Negative for polydipsia and polyuria. Genitourinary: Negative for dysuria, frequency and urgency. Neurological: Negative for syncope, light-headedness, numbness and headaches. Psychiatric/Behavioral: Positive for agitation and confusion. Negative for sleep disturbance. The patient is not nervous/anxious.         Vitals:    03/23/18 1106   BP: 136/70   Site: Right Arm   Position: Sitting   Cuff Size: Medium Adult   Pulse: 62   Temp: 98.5 °F (36.9 °C)   TempSrc: Oral   SpO2: 96%   Weight: 150 lb 3.2 oz (68.1 kg)       Physical exam:  Physical Exam Constitutional: She is oriented to person, place, and time. She appears well-developed and well-nourished. No distress. HENT:   Head: Normocephalic and atraumatic. Mouth/Throat: No oropharyngeal exudate. Eyes: EOM are normal.   Neck: Normal range of motion. No thyromegaly present. Cardiovascular: Normal rate, regular rhythm and normal heart sounds. No murmur heard. Pulmonary/Chest: Effort normal and breath sounds normal. No respiratory distress. She has no wheezes. Abdominal: Soft. She exhibits no distension. There is no tenderness. There is no rebound and no guarding. Musculoskeletal: She exhibits no edema. Lymphadenopathy:     She has no cervical adenopathy. Neurological: She is alert and oriented to person, place, and time. Skin: Skin is warm and dry. Psychiatric: She has a normal mood and affect. Her behavior is normal.   Vitals reviewed. Assessment/Plan:  80 y.o. female here mainly for dementia:  - Dementia: She will bring in urine. Will treat any uti. Will have aid check sugar during confusion episodes and call them in. If no improvement with controlling UTI and DM2 then will increase the aricept. She will be seeing neuro this summer.   - DM2: at goal  - UTI: bactrim sent to pharmacy. 1. Dementia without behavioral disturbance, unspecified dementia type     2. Confused  POCT Urinalysis No Micro (Auto)   3. Type 2 diabetes mellitus with complication, without long-term current use of insulin (HCC)  POCT glycosylated hemoglobin (Hb A1C)   4. Urinary tract infection without hematuria, site unspecified          Return if symptoms worsen or fail to improve.     Tiarra Gaspar MD

## 2018-03-26 ENCOUNTER — TELEPHONE (OUTPATIENT)
Dept: FAMILY MEDICINE CLINIC | Age: 83
End: 2018-03-26

## 2018-03-26 ENCOUNTER — CARE COORDINATION (OUTPATIENT)
Dept: CARE COORDINATION | Age: 83
End: 2018-03-26

## 2018-03-26 RX ORDER — OXYCODONE HYDROCHLORIDE AND ACETAMINOPHEN 5; 325 MG/1; MG/1
1 TABLET ORAL EVERY 4 HOURS PRN
COMMUNITY
End: 2018-05-15 | Stop reason: ALTCHOICE

## 2018-04-03 ENCOUNTER — CARE COORDINATION (OUTPATIENT)
Dept: CARE COORDINATION | Age: 83
End: 2018-04-03

## 2018-04-09 ENCOUNTER — NURSE ONLY (OUTPATIENT)
Dept: FAMILY MEDICINE CLINIC | Age: 83
End: 2018-04-09
Payer: MEDICARE

## 2018-04-09 DIAGNOSIS — I48.91 ATRIAL FIBRILLATION, UNSPECIFIED TYPE (HCC): Primary | ICD-10-CM

## 2018-04-09 LAB
INTERNATIONAL NORMALIZATION RATIO, POC: 1
PROTHROMBIN TIME, POC: NORMAL

## 2018-04-09 PROCEDURE — 85610 PROTHROMBIN TIME: CPT | Performed by: FAMILY MEDICINE

## 2018-04-16 ENCOUNTER — ANTI-COAG VISIT (OUTPATIENT)
Dept: FAMILY MEDICINE CLINIC | Age: 83
End: 2018-04-16
Payer: MEDICARE

## 2018-04-16 DIAGNOSIS — I48.91 ATRIAL FIBRILLATION, UNSPECIFIED TYPE (HCC): Primary | ICD-10-CM

## 2018-04-16 LAB
INTERNATIONAL NORMALIZATION RATIO, POC: 1
PROTHROMBIN TIME, POC: ABNORMAL

## 2018-04-16 PROCEDURE — 85610 PROTHROMBIN TIME: CPT | Performed by: FAMILY MEDICINE

## 2018-04-19 ENCOUNTER — CARE COORDINATION (OUTPATIENT)
Dept: CARE COORDINATION | Age: 83
End: 2018-04-19

## 2018-04-23 ENCOUNTER — ANTI-COAG VISIT (OUTPATIENT)
Dept: FAMILY MEDICINE CLINIC | Age: 83
End: 2018-04-23
Payer: MEDICARE

## 2018-04-23 DIAGNOSIS — I48.91 ATRIAL FIBRILLATION, UNSPECIFIED TYPE (HCC): Primary | ICD-10-CM

## 2018-04-23 LAB
INTERNATIONAL NORMALIZATION RATIO, POC: 1.4
PROTHROMBIN TIME, POC: ABNORMAL

## 2018-04-23 PROCEDURE — 85610 PROTHROMBIN TIME: CPT | Performed by: FAMILY MEDICINE

## 2018-04-27 PROBLEM — Z85.3 HISTORY OF CANCER OF LEFT BREAST: Chronic | Status: ACTIVE | Noted: 2018-04-27

## 2018-04-30 ENCOUNTER — NURSE ONLY (OUTPATIENT)
Dept: FAMILY MEDICINE CLINIC | Age: 83
End: 2018-04-30
Payer: MEDICARE

## 2018-04-30 DIAGNOSIS — I48.91 ATRIAL FIBRILLATION, UNSPECIFIED TYPE (HCC): Primary | Chronic | ICD-10-CM

## 2018-04-30 LAB
INTERNATIONAL NORMALIZATION RATIO, POC: 3.3
PROTHROMBIN TIME, POC: ABNORMAL

## 2018-04-30 PROCEDURE — 85610 PROTHROMBIN TIME: CPT | Performed by: FAMILY MEDICINE

## 2018-05-07 ENCOUNTER — NURSE ONLY (OUTPATIENT)
Dept: FAMILY MEDICINE CLINIC | Age: 83
End: 2018-05-07
Payer: MEDICARE

## 2018-05-07 DIAGNOSIS — I48.91 ATRIAL FIBRILLATION, UNSPECIFIED TYPE (HCC): Primary | Chronic | ICD-10-CM

## 2018-05-07 LAB
INTERNATIONAL NORMALIZATION RATIO, POC: 3.1
PROTHROMBIN TIME, POC: ABNORMAL

## 2018-05-07 PROCEDURE — 85610 PROTHROMBIN TIME: CPT | Performed by: FAMILY MEDICINE

## 2018-05-14 ENCOUNTER — HOSPITAL ENCOUNTER (OUTPATIENT)
Age: 83
Setting detail: SPECIMEN
Discharge: HOME | End: 2018-05-14
Payer: MEDICARE

## 2018-05-14 ENCOUNTER — NURSE ONLY (OUTPATIENT)
Dept: FAMILY MEDICINE CLINIC | Age: 83
End: 2018-05-14
Payer: MEDICARE

## 2018-05-14 DIAGNOSIS — N28.9 FUNCTION KIDNEY DECREASED: Primary | ICD-10-CM

## 2018-05-14 DIAGNOSIS — N28.9 FUNCTION KIDNEY DECREASED: ICD-10-CM

## 2018-05-14 LAB
ANION GAP SERPL CALCULATED.3IONS-SCNC: 15 MEQ/L (ref 7–13)
BUN BLDV-MCNC: 17 MG/DL (ref 8–23)
CALCIUM SERPL-MCNC: 8.8 MG/DL (ref 8.6–10.2)
CHLORIDE BLD-SCNC: 98 MEQ/L (ref 98–107)
CO2: 26 MEQ/L (ref 22–29)
CREAT SERPL-MCNC: 1.18 MG/DL (ref 0.5–0.9)
GFR AFRICAN AMERICAN: 52.8
GFR NON-AFRICAN AMERICAN: 43.7
GLUCOSE BLD-MCNC: 152 MG/DL (ref 74–109)
POTASSIUM SERPL-SCNC: 5.3 MEQ/L (ref 3.5–5.1)
SODIUM BLD-SCNC: 139 MEQ/L (ref 132–144)

## 2018-05-14 PROCEDURE — 36415 COLL VENOUS BLD VENIPUNCTURE: CPT | Performed by: FAMILY MEDICINE

## 2018-05-14 PROCEDURE — 80048 BASIC METABOLIC PNL TOTAL CA: CPT

## 2018-05-15 ENCOUNTER — OFFICE VISIT (OUTPATIENT)
Dept: FAMILY MEDICINE CLINIC | Age: 83
End: 2018-05-15
Payer: MEDICARE

## 2018-05-15 VITALS
HEART RATE: 74 BPM | SYSTOLIC BLOOD PRESSURE: 132 MMHG | HEIGHT: 59 IN | DIASTOLIC BLOOD PRESSURE: 78 MMHG | TEMPERATURE: 97.7 F | WEIGHT: 152.8 LBS | OXYGEN SATURATION: 94 % | RESPIRATION RATE: 16 BRPM | BODY MASS INDEX: 30.8 KG/M2

## 2018-05-15 DIAGNOSIS — Z23 NEED FOR VACCINATION: ICD-10-CM

## 2018-05-15 DIAGNOSIS — I48.91 ATRIAL FIBRILLATION, UNSPECIFIED TYPE (HCC): Primary | Chronic | ICD-10-CM

## 2018-05-15 DIAGNOSIS — N18.30 CKD (CHRONIC KIDNEY DISEASE) STAGE 3, GFR 30-59 ML/MIN (HCC): Chronic | ICD-10-CM

## 2018-05-15 DIAGNOSIS — M54.50 BILATERAL LOW BACK PAIN WITHOUT SCIATICA, UNSPECIFIED CHRONICITY: ICD-10-CM

## 2018-05-15 DIAGNOSIS — K21.9 GASTROESOPHAGEAL REFLUX DISEASE, ESOPHAGITIS PRESENCE NOT SPECIFIED: Chronic | ICD-10-CM

## 2018-05-15 DIAGNOSIS — E87.5 HYPERKALEMIA: ICD-10-CM

## 2018-05-15 DIAGNOSIS — E78.5 HYPERLIPIDEMIA, UNSPECIFIED HYPERLIPIDEMIA TYPE: Chronic | ICD-10-CM

## 2018-05-15 DIAGNOSIS — I10 ESSENTIAL HYPERTENSION: Chronic | ICD-10-CM

## 2018-05-15 DIAGNOSIS — F03.90 DEMENTIA WITHOUT BEHAVIORAL DISTURBANCE, UNSPECIFIED DEMENTIA TYPE: Chronic | ICD-10-CM

## 2018-05-15 DIAGNOSIS — F32.A DEPRESSION, UNSPECIFIED DEPRESSION TYPE: Chronic | ICD-10-CM

## 2018-05-15 DIAGNOSIS — E11.8 TYPE 2 DIABETES MELLITUS WITH COMPLICATION, WITHOUT LONG-TERM CURRENT USE OF INSULIN (HCC): Chronic | ICD-10-CM

## 2018-05-15 PROCEDURE — 1036F TOBACCO NON-USER: CPT | Performed by: FAMILY MEDICINE

## 2018-05-15 PROCEDURE — G8399 PT W/DXA RESULTS DOCUMENT: HCPCS | Performed by: FAMILY MEDICINE

## 2018-05-15 PROCEDURE — 1090F PRES/ABSN URINE INCON ASSESS: CPT | Performed by: FAMILY MEDICINE

## 2018-05-15 PROCEDURE — G8417 CALC BMI ABV UP PARAM F/U: HCPCS | Performed by: FAMILY MEDICINE

## 2018-05-15 PROCEDURE — G8427 DOCREV CUR MEDS BY ELIG CLIN: HCPCS | Performed by: FAMILY MEDICINE

## 2018-05-15 PROCEDURE — 99214 OFFICE O/P EST MOD 30 MIN: CPT | Performed by: FAMILY MEDICINE

## 2018-05-15 PROCEDURE — 1123F ACP DISCUSS/DSCN MKR DOCD: CPT | Performed by: FAMILY MEDICINE

## 2018-05-15 PROCEDURE — 4040F PNEUMOC VAC/ADMIN/RCVD: CPT | Performed by: FAMILY MEDICINE

## 2018-05-15 ASSESSMENT — ENCOUNTER SYMPTOMS
VOMITING: 0
SHORTNESS OF BREATH: 1
BLOOD IN STOOL: 0
DIARRHEA: 0
CONSTIPATION: 0
BACK PAIN: 1
NAUSEA: 0
WHEEZING: 0
CHEST TIGHTNESS: 0
ABDOMINAL PAIN: 0
COUGH: 0

## 2018-05-18 ENCOUNTER — CARE COORDINATION (OUTPATIENT)
Dept: CARE COORDINATION | Age: 83
End: 2018-05-18

## 2018-05-21 ENCOUNTER — HOSPITAL ENCOUNTER (OUTPATIENT)
Age: 83
Discharge: HOME | End: 2018-05-23
Payer: MEDICARE

## 2018-05-21 ENCOUNTER — HOSPITAL ENCOUNTER (OUTPATIENT)
Dept: GENERAL RADIOLOGY | Age: 83
Discharge: HOME | End: 2018-05-23
Payer: MEDICARE

## 2018-05-21 ENCOUNTER — HOSPITAL ENCOUNTER (OUTPATIENT)
Dept: LAB | Age: 83
Discharge: HOME | End: 2018-05-21
Payer: MEDICARE

## 2018-05-21 DIAGNOSIS — M54.50 BILATERAL LOW BACK PAIN WITHOUT SCIATICA, UNSPECIFIED CHRONICITY: ICD-10-CM

## 2018-05-21 DIAGNOSIS — E87.5 HYPERKALEMIA: ICD-10-CM

## 2018-05-21 PROBLEM — M41.9 SCOLIOSIS: Chronic | Status: ACTIVE | Noted: 2018-05-21

## 2018-05-21 PROBLEM — M51.36 DDD (DEGENERATIVE DISC DISEASE), LUMBAR: Chronic | Status: ACTIVE | Noted: 2018-05-21

## 2018-05-21 LAB — POTASSIUM SERPL-SCNC: 4.5 MEQ/L (ref 3.5–5.1)

## 2018-05-21 PROCEDURE — 84132 ASSAY OF SERUM POTASSIUM: CPT

## 2018-05-21 PROCEDURE — 72100 X-RAY EXAM L-S SPINE 2/3 VWS: CPT

## 2018-05-21 PROCEDURE — 36415 COLL VENOUS BLD VENIPUNCTURE: CPT

## 2018-05-22 DIAGNOSIS — M51.36 DDD (DEGENERATIVE DISC DISEASE), LUMBAR: Primary | Chronic | ICD-10-CM

## 2018-05-22 DIAGNOSIS — M41.9 SCOLIOSIS, UNSPECIFIED SCOLIOSIS TYPE, UNSPECIFIED SPINAL REGION: Chronic | ICD-10-CM

## 2018-05-31 ENCOUNTER — HOSPITAL ENCOUNTER (OUTPATIENT)
Age: 83
Setting detail: SPECIMEN
Discharge: HOME | End: 2018-05-31
Payer: MEDICARE

## 2018-05-31 ENCOUNTER — NURSE ONLY (OUTPATIENT)
Dept: FAMILY MEDICINE CLINIC | Age: 83
End: 2018-05-31
Payer: MEDICARE

## 2018-05-31 ENCOUNTER — TELEPHONE (OUTPATIENT)
Dept: FAMILY MEDICINE CLINIC | Age: 83
End: 2018-05-31

## 2018-05-31 DIAGNOSIS — R35.0 URINARY FREQUENCY: Primary | ICD-10-CM

## 2018-05-31 DIAGNOSIS — R35.0 URINARY FREQUENCY: ICD-10-CM

## 2018-05-31 LAB
BILIRUBIN, POC: NORMAL
BLOOD URINE, POC: NORMAL
CLARITY, POC: CLEAR
COLOR, POC: YELLOW
GLUCOSE URINE, POC: NORMAL
KETONES, POC: NORMAL
LEUKOCYTE EST, POC: NORMAL
NITRITE, POC: NORMAL
PH, POC: 8.5
PROTEIN, POC: NORMAL
SPECIFIC GRAVITY, POC: 1.01
UROBILINOGEN, POC: NORMAL

## 2018-05-31 PROCEDURE — 81001 URINALYSIS AUTO W/SCOPE: CPT

## 2018-05-31 PROCEDURE — 81003 URINALYSIS AUTO W/O SCOPE: CPT | Performed by: FAMILY MEDICINE

## 2018-05-31 PROCEDURE — 87086 URINE CULTURE/COLONY COUNT: CPT

## 2018-06-01 LAB
BACTERIA: NORMAL /HPF
BILIRUBIN URINE: NEGATIVE
BLOOD, URINE: NEGATIVE
CLARITY: CLEAR
COLOR: YELLOW
GLUCOSE URINE: NEGATIVE MG/DL
KETONES, URINE: NEGATIVE MG/DL
LEUKOCYTE ESTERASE, URINE: NEGATIVE
NITRITE, URINE: NEGATIVE
PH UA: 8.5 (ref 5–9)
PROTEIN UA: NEGATIVE MG/DL
RBC UA: NORMAL /HPF (ref 0–2)
SPECIFIC GRAVITY UA: 1.01 (ref 1–1.03)
UROBILINOGEN, URINE: 0.2 E.U./DL
WBC UA: NORMAL /HPF (ref 0–5)

## 2018-06-02 LAB — URINE CULTURE, ROUTINE: NORMAL

## 2018-06-04 DIAGNOSIS — E11.8 TYPE 2 DIABETES MELLITUS WITH COMPLICATION, WITHOUT LONG-TERM CURRENT USE OF INSULIN (HCC): ICD-10-CM

## 2018-06-04 RX ORDER — GLIPIZIDE 10 MG/1
10 TABLET ORAL DAILY
Qty: 30 TABLET | Refills: 5 | Status: SHIPPED | OUTPATIENT
Start: 2018-06-04 | End: 2018-12-01 | Stop reason: SDUPTHER

## 2018-06-05 ENCOUNTER — CARE COORDINATION (OUTPATIENT)
Dept: CARE COORDINATION | Age: 83
End: 2018-06-05

## 2018-06-11 ENCOUNTER — OFFICE VISIT (OUTPATIENT)
Dept: FAMILY MEDICINE CLINIC | Age: 83
End: 2018-06-11
Payer: MEDICARE

## 2018-06-11 VITALS
BODY MASS INDEX: 31 KG/M2 | WEIGHT: 153.8 LBS | HEIGHT: 59 IN | HEART RATE: 80 BPM | DIASTOLIC BLOOD PRESSURE: 80 MMHG | SYSTOLIC BLOOD PRESSURE: 120 MMHG | TEMPERATURE: 96.8 F | OXYGEN SATURATION: 95 % | RESPIRATION RATE: 12 BRPM

## 2018-06-11 DIAGNOSIS — F03.90 DEMENTIA WITHOUT BEHAVIORAL DISTURBANCE, UNSPECIFIED DEMENTIA TYPE: Primary | Chronic | ICD-10-CM

## 2018-06-11 DIAGNOSIS — I48.91 ATRIAL FIBRILLATION, UNSPECIFIED TYPE (HCC): Chronic | ICD-10-CM

## 2018-06-11 DIAGNOSIS — E11.8 TYPE 2 DIABETES MELLITUS WITH COMPLICATION, WITHOUT LONG-TERM CURRENT USE OF INSULIN (HCC): Chronic | ICD-10-CM

## 2018-06-11 DIAGNOSIS — F32.A DEPRESSION, UNSPECIFIED DEPRESSION TYPE: Chronic | ICD-10-CM

## 2018-06-11 DIAGNOSIS — I10 ESSENTIAL HYPERTENSION: Chronic | ICD-10-CM

## 2018-06-11 PROCEDURE — 1036F TOBACCO NON-USER: CPT | Performed by: FAMILY MEDICINE

## 2018-06-11 PROCEDURE — G8417 CALC BMI ABV UP PARAM F/U: HCPCS | Performed by: FAMILY MEDICINE

## 2018-06-11 PROCEDURE — 4040F PNEUMOC VAC/ADMIN/RCVD: CPT | Performed by: FAMILY MEDICINE

## 2018-06-11 PROCEDURE — 99214 OFFICE O/P EST MOD 30 MIN: CPT | Performed by: FAMILY MEDICINE

## 2018-06-11 PROCEDURE — G8427 DOCREV CUR MEDS BY ELIG CLIN: HCPCS | Performed by: FAMILY MEDICINE

## 2018-06-11 PROCEDURE — 1090F PRES/ABSN URINE INCON ASSESS: CPT | Performed by: FAMILY MEDICINE

## 2018-06-11 PROCEDURE — G8399 PT W/DXA RESULTS DOCUMENT: HCPCS | Performed by: FAMILY MEDICINE

## 2018-06-11 PROCEDURE — 1123F ACP DISCUSS/DSCN MKR DOCD: CPT | Performed by: FAMILY MEDICINE

## 2018-06-11 RX ORDER — DONEPEZIL HYDROCHLORIDE 10 MG/1
10 TABLET, FILM COATED ORAL NIGHTLY
Qty: 30 TABLET | Refills: 5 | Status: SHIPPED | OUTPATIENT
Start: 2018-06-11 | End: 2018-12-31 | Stop reason: SDUPTHER

## 2018-06-11 RX ORDER — LINAGLIPTIN 5 MG/1
TABLET, FILM COATED ORAL
COMMUNITY
Start: 2018-05-31 | End: 2018-08-14 | Stop reason: SDUPTHER

## 2018-06-11 ASSESSMENT — ENCOUNTER SYMPTOMS
CONSTIPATION: 0
ANAL BLEEDING: 0
COUGH: 0
VOMITING: 0
CHEST TIGHTNESS: 0
NAUSEA: 0
SHORTNESS OF BREATH: 0
ABDOMINAL PAIN: 0
DIARRHEA: 0
BLOOD IN STOOL: 0

## 2018-06-14 ENCOUNTER — CARE COORDINATION (OUTPATIENT)
Dept: FAMILY MEDICINE CLINIC | Age: 83
End: 2018-06-14

## 2018-06-18 PROBLEM — M47.816 SPONDYLOSIS OF LUMBAR REGION WITHOUT MYELOPATHY OR RADICULOPATHY: Status: ACTIVE | Noted: 2018-06-18

## 2018-06-20 ENCOUNTER — CARE COORDINATION (OUTPATIENT)
Dept: CARE COORDINATION | Age: 83
End: 2018-06-20

## 2018-08-14 ENCOUNTER — OFFICE VISIT (OUTPATIENT)
Dept: FAMILY MEDICINE CLINIC | Age: 83
End: 2018-08-14
Payer: MEDICARE

## 2018-08-14 VITALS
SYSTOLIC BLOOD PRESSURE: 140 MMHG | BODY MASS INDEX: 30.68 KG/M2 | HEIGHT: 59 IN | RESPIRATION RATE: 18 BRPM | DIASTOLIC BLOOD PRESSURE: 80 MMHG | TEMPERATURE: 98.2 F | HEART RATE: 58 BPM | WEIGHT: 152.2 LBS | OXYGEN SATURATION: 96 %

## 2018-08-14 DIAGNOSIS — I10 ESSENTIAL HYPERTENSION: Chronic | ICD-10-CM

## 2018-08-14 DIAGNOSIS — F03.91 DEMENTIA WITH BEHAVIORAL DISTURBANCE, UNSPECIFIED DEMENTIA TYPE: Chronic | ICD-10-CM

## 2018-08-14 DIAGNOSIS — N18.30 CKD (CHRONIC KIDNEY DISEASE) STAGE 3, GFR 30-59 ML/MIN (HCC): Chronic | ICD-10-CM

## 2018-08-14 DIAGNOSIS — E11.8 TYPE 2 DIABETES MELLITUS WITH COMPLICATION, WITHOUT LONG-TERM CURRENT USE OF INSULIN (HCC): Primary | Chronic | ICD-10-CM

## 2018-08-14 DIAGNOSIS — I48.91 ATRIAL FIBRILLATION, UNSPECIFIED TYPE (HCC): Chronic | ICD-10-CM

## 2018-08-14 DIAGNOSIS — E78.5 HYPERLIPIDEMIA, UNSPECIFIED HYPERLIPIDEMIA TYPE: Chronic | ICD-10-CM

## 2018-08-14 DIAGNOSIS — K21.9 GASTROESOPHAGEAL REFLUX DISEASE, ESOPHAGITIS PRESENCE NOT SPECIFIED: Chronic | ICD-10-CM

## 2018-08-14 DIAGNOSIS — F32.A DEPRESSION, UNSPECIFIED DEPRESSION TYPE: Chronic | ICD-10-CM

## 2018-08-14 PROCEDURE — 99214 OFFICE O/P EST MOD 30 MIN: CPT | Performed by: FAMILY MEDICINE

## 2018-08-14 PROCEDURE — 1123F ACP DISCUSS/DSCN MKR DOCD: CPT | Performed by: FAMILY MEDICINE

## 2018-08-14 PROCEDURE — G8399 PT W/DXA RESULTS DOCUMENT: HCPCS | Performed by: FAMILY MEDICINE

## 2018-08-14 PROCEDURE — G8427 DOCREV CUR MEDS BY ELIG CLIN: HCPCS | Performed by: FAMILY MEDICINE

## 2018-08-14 PROCEDURE — 4040F PNEUMOC VAC/ADMIN/RCVD: CPT | Performed by: FAMILY MEDICINE

## 2018-08-14 PROCEDURE — G8417 CALC BMI ABV UP PARAM F/U: HCPCS | Performed by: FAMILY MEDICINE

## 2018-08-14 PROCEDURE — 1101F PT FALLS ASSESS-DOCD LE1/YR: CPT | Performed by: FAMILY MEDICINE

## 2018-08-14 PROCEDURE — 1036F TOBACCO NON-USER: CPT | Performed by: FAMILY MEDICINE

## 2018-08-14 PROCEDURE — 1090F PRES/ABSN URINE INCON ASSESS: CPT | Performed by: FAMILY MEDICINE

## 2018-08-14 RX ORDER — TRAZODONE HYDROCHLORIDE 50 MG/1
50 TABLET ORAL NIGHTLY
Qty: 30 TABLET | Refills: 3 | Status: SHIPPED | OUTPATIENT
Start: 2018-08-14 | End: 2018-12-24 | Stop reason: SDUPTHER

## 2018-08-14 RX ORDER — LINAGLIPTIN 5 MG/1
5 TABLET, FILM COATED ORAL DAILY
Qty: 90 TABLET | Refills: 3 | Status: SHIPPED | OUTPATIENT
Start: 2018-08-14 | End: 2019-12-12

## 2018-08-14 RX ORDER — OLANZAPINE 5 MG/1
TABLET ORAL
Qty: 15 TABLET | Refills: 3 | Status: CANCELLED | OUTPATIENT
Start: 2018-08-14

## 2018-08-14 ASSESSMENT — ENCOUNTER SYMPTOMS
BACK PAIN: 1
COUGH: 0
NAUSEA: 0
CHEST TIGHTNESS: 0
SHORTNESS OF BREATH: 0
VOMITING: 0
ABDOMINAL PAIN: 0
CONSTIPATION: 0
WHEEZING: 0
DIARRHEA: 0
BLOOD IN STOOL: 0

## 2018-08-14 NOTE — PROGRESS NOTES
Subjective:      Patient ID: Lurene Goodell is a 80 y.o. female who presents today for:  Chief Complaint   Patient presents with    Diabetes     Presents today for her routine chronic check up. Reports that her sugar was running high but, it is because she ran out of her medication and pharmacy wouldnt fill because it was in old PCP name.  Back Pain     Patient still has back pain. Saw Pain Managment but, all he wants to do is give her shots and care giver doesnt think that is appropriate for her age   Priscilla Slimmer Shortness of Breath     Caregiver reports that she has been running out of breath a lot more       HPI     Patient presents for chronic diabetes, atrial fibrillation, hyperlipidemia, GERD, depression F/U visit. Since her most recent visit she was established with pain management (Dr. Shelbie Evans) for her chronic low back pain and recommended to start formal physical therapy with potential for interventional procedures. She has elected not to return due to not wanting physical therapy or interventional management. Patient has been out of tradjenta for the past 3 weeks, home BG readings have ranged from 170's-360's with no reported polyuria, polydipsia or polyphagia. No reported sudden vision changes or reported new onset paresthesias. No N/V, diarrhea, or dysuria reported. Per caregiver patient has been more depressed and having more problems with falling asleep over the past 1-2 months. She has continued with Celexa use on a daily basis with Zyprexa use as needed for anxiety in the evening. No SI/HI or self-harming behaviors noted.      Past Medical History:   Diagnosis Date    Atrial fibrillation ([facility] [address]     CKD (chronic kidney disease) stage 3, GFR 30-59 ml/min 5/15/2018    DDD (degenerative disc disease), lumbar 5/21/2018    Dementia with behavioral disturbance 12/9/2015    Depression     Diverticular disease 2004    ct pelvis    DJD (degenerative joint disease) of knee     both    GERD (gastroesophageal reflux disease)     GERD (gastroesophageal reflux disease) 5/15/2018    Hernia, hiatal     egd    History of cancer of left breast 4/27/2018    2010 s/p left mastectomy    HTN (hypertension) 6/27/2013    Hyperlipidemia     Insomnia     Malignant neoplasm of female breast ([facility] [address] 2010    left breast s/p mastectomy    Osteopenia 2003    Pancreas cyst 2007    Scoliosis 5/21/2018    Type 2 diabetes mellitus with complication, without long-term current use of insulin ([facility] [address] 6/27/2013     Past Surgical History:   Procedure Laterality Date    APPENDECTOMY  1974    BLEPHAROPLASTY Bilateral 2011    cosmetic    CATARACT REMOVAL Left     OU    COLONOSCOPY  2006    ENDOSCOPY, COLON, DIAGNOSTIC      HYSTERECTOMY  1974    KNEE ARTHROSCOPY Left 04/2007    left   [address]    with disc surgery    MASTECTOMY Left 2010    left    OVARY REMOVAL Right 1974    only right    NM HEMORRHOIDECTOMY,INT/EXT,1 COLUMN/GROUP N/A 2/1/2018    HEMORRHOIDECTOMY performed by Marie Archer MD at [address] Right 2002    right    ROTATOR CUFF REPAIR Left 2003    left    TOE AMPUTATION Left 1969    1st and 2nd toes distal tips d/t  accident    TONSILLECTOMY  1939    UTERINE FIBROID SURGERY  1964     Family History   Problem Relation Age of Onset    Heart Disease Mother         enlarged heart    Cancer Father         bone    No Known Problems Sister      Social History     Social History    Marital status:      Spouse name: N/A    Number of children: N/A    Years of education: N/A     Occupational History    Retired      Social History Main Topics    Smoking status: Never Smoker    Smokeless tobacco: Never Used    Alcohol use No    Drug use: No    Sexual activity: Not Currently     Other Topics Concern    Not on file     Social History Narrative    Lives with a caregiver, son is POA and lives in [place]. Son checks on her in the evenings.          1 cat    1 dog Current Outpatient Prescriptions on File Prior to Visit   Medication Sig Dispense Refill    amLODIPine (NORVASC) 5 MG tablet Take 1 tablet by mouth daily 90 tablet 3    enalapril (VASOTEC) 10 MG tablet Take 1 tablet by mouth daily 90 tablet 1    donepezil (ARICEPT) 10 MG tablet Take 1 tablet by mouth nightly 30 tablet 5    glipiZIDE (GLUCOTROL) 10 MG tablet Take 1 tablet by mouth daily 30 tablet 5    Handicap Placard MISC by Does not apply route DX: Dementia (F03.90), Type 2 Diabetes (E11.8), Atrial Fibrillation (I48.91)     EXPIRES: 05/2023 1 each 0    warfarin (COUMADIN) 2.5 MG tablet Take 2.5 mg by mouth daily Take daily as directed per physician based on INR results      docusate sodium (STOOL SOFTENER) 100 MG capsule Take 100 mg by mouth daily       Psyllium ([employer] NATURAL FIBER SUPPLEMENT PO) Take 1 tablet by mouth daily      omeprazole (PRILOSEC) 20 MG delayed release capsule TAKE ONE CAPSULE BY MOUTH ONCE DAILY 90 capsule 3    ferrous sulfate (FE TABS) 325 (65 Fe) MG EC tablet Take 1 tablet by mouth daily (with breakfast) 90 tablet 1    acetaminophen (APAP EXTRA STRENGTH) 500 MG tablet Take 2 tablets by mouth 3 times daily as needed for Pain 60 tablet 3    Blood Glucose Monitoring Suppl TROY Use TID 1 Device 0    Glucose Blood (BLOOD GLUCOSE TEST STRIPS) STRP Use  strip 11    Lancets MISC 1 each by Does not apply route 3 times daily 100 each 11    Blood Glucose Monitoring Suppl (FREESTYLE FREEDOM) KIT Test once daily. DX: E11.8 1 kit 0    melatonin 3 MG TABS tablet Take 1 tablet by mouth nightly 30 tablet 3    sotalol (BETAPACE) 80 MG tablet Take 1 tablet by mouth 2 times daily (Patient taking differently: Take 80 mg by mouth 2 times daily Take 1 tablet twice daily) 180 tablet 1    Calcium Carbonate-Vitamin D (CALCIUM + D PO) Take 1 tablet by mouth daily      Compression Stockings MISC by Does not apply route.  Knee high 1 each 0    atenolol (TENORMIN) 50 MG tablet Take 50 mg by mouth daily Take 1 tablet daily      atorvastatin (LIPITOR) 10 MG tablet Take 10 mg by mouth daily At bedtime      aspirin 81 MG tablet Take 81 mg by mouth daily. No current facility-administered medications on file prior to visit. Allergies:  Patient has no known allergies. Review of Systems   Constitutional: Negative for appetite change, chills, diaphoresis, fatigue, fever and unexpected weight change. Eyes: Negative for visual disturbance. Respiratory: Negative for cough, chest tightness, shortness of breath and wheezing. Cardiovascular: Negative for chest pain, palpitations and leg swelling. No orthopnea, No PND   Gastrointestinal: Negative for abdominal pain, blood in stool, constipation, diarrhea, nausea and vomiting. No heartburn, No melena   Endocrine: Negative for cold intolerance, heat intolerance, polydipsia, polyphagia and polyuria. Genitourinary: Negative for dysuria and hematuria. Musculoskeletal: Positive for back pain. Skin: Negative for rash. Neurological: Negative for dizziness, syncope, weakness, light-headedness, numbness and headaches. Psychiatric/Behavioral: Positive for dysphoric mood and sleep disturbance. Negative for hallucinations, self-injury and suicidal ideas. The patient is not nervous/anxious. Objective:     BP (!) 140/80   Pulse 58   Temp 98.2 °F (36.8 °C) (Temporal)   Resp 18   Ht 4' 11\" (1.499 m)   Wt 152 lb 3.2 oz (69 kg)   SpO2 96%   Breastfeeding? No   BMI 30.74 kg/m²     Physical Exam   Constitutional: She appears well-developed and well-nourished. No distress. Neck: Neck supple. Carotid bruit is not present. No thyromegaly present. Cardiovascular: Normal rate, regular rhythm, normal heart sounds and intact distal pulses. No murmur heard. Pulmonary/Chest: Effort normal and breath sounds normal. No respiratory distress. She has no wheezes. She has no rales. Abdominal: Soft.  Bowel sounds are normal. She Future    6. Gastroesophageal reflux disease, esophagitis presence not specified  No reported gastrointestinal complaints or change in appetite. Continue current management    7. Dementia with behavioral disturbance, unspecified dementia type  Continue current medication    8. Depression, unspecified depression type  No SI/HI. Based on reports of worsening mood and more difficulty with sleep I will have patient change her medication regimen. She'll be changed from Celexa to Sertraline and we will add Trazodone for help in the evening with sleep/agitation and further depression management. - traZODone (DESYREL) 50 MG tablet; Take 1 tablet by mouth nightly  Dispense: 30 tablet; Refill: 3  - sertraline (ZOLOFT) 50 MG tablet; Take 1 tablet by mouth daily  Dispense: 30 tablet; Refill: 3      Modified Medications    Modified Medication Previous Medication    TRADJENTA 5 MG TABLET TRADJENTA 5 MG tablet       Take 1 tablet by mouth daily           New Prescriptions    SERTRALINE (ZOLOFT) 50 MG TABLET    Take 1 tablet by mouth daily    TRAZODONE (DESYREL) 50 MG TABLET    Take 1 tablet by mouth nightly       Medications Discontinued During This Encounter   Medication Reason    OLANZapine (ZYPREXA) 5 MG tablet Alternate therapy    citalopram (CELEXA) 20 MG tablet Alternate therapy    TRADJENTA 5 MG tablet REORDER       Return for nurse visit BP check in 1 week, Chronic Disease Check in 3 Months.     Ivy Dawson MD

## 2018-08-23 ENCOUNTER — NURSE ONLY (OUTPATIENT)
Dept: FAMILY MEDICINE CLINIC | Age: 83
End: 2018-08-23

## 2018-08-23 ENCOUNTER — HOSPITAL ENCOUNTER (OUTPATIENT)
Dept: LAB | Age: 83
Discharge: HOME | End: 2018-08-23
Payer: MEDICARE

## 2018-08-23 ENCOUNTER — TELEPHONE (OUTPATIENT)
Dept: FAMILY MEDICINE CLINIC | Age: 83
End: 2018-08-23

## 2018-08-23 VITALS — DIASTOLIC BLOOD PRESSURE: 84 MMHG | SYSTOLIC BLOOD PRESSURE: 142 MMHG

## 2018-08-23 DIAGNOSIS — I10 ESSENTIAL HYPERTENSION: Chronic | ICD-10-CM

## 2018-08-23 DIAGNOSIS — N18.30 CKD (CHRONIC KIDNEY DISEASE) STAGE 3, GFR 30-59 ML/MIN (HCC): Chronic | ICD-10-CM

## 2018-08-23 DIAGNOSIS — I10 ESSENTIAL HYPERTENSION: Primary | ICD-10-CM

## 2018-08-23 DIAGNOSIS — I48.91 ATRIAL FIBRILLATION, UNSPECIFIED TYPE (HCC): Chronic | ICD-10-CM

## 2018-08-23 DIAGNOSIS — E78.5 HYPERLIPIDEMIA, UNSPECIFIED HYPERLIPIDEMIA TYPE: Chronic | ICD-10-CM

## 2018-08-23 DIAGNOSIS — E11.8 TYPE 2 DIABETES MELLITUS WITH COMPLICATION, WITHOUT LONG-TERM CURRENT USE OF INSULIN (HCC): Chronic | ICD-10-CM

## 2018-08-23 LAB
ALBUMIN SERPL-MCNC: 4.2 G/DL (ref 3.9–4.9)
ALP BLD-CCNC: 70 U/L (ref 40–130)
ALT SERPL-CCNC: 9 U/L (ref 0–33)
ANION GAP SERPL CALCULATED.3IONS-SCNC: 16 MEQ/L (ref 7–13)
AST SERPL-CCNC: 26 U/L (ref 0–35)
BASOPHILS ABSOLUTE: 0.1 K/UL (ref 0–0.2)
BASOPHILS RELATIVE PERCENT: 0.6 %
BILIRUB SERPL-MCNC: <0.2 MG/DL (ref 0–1.2)
BUN BLDV-MCNC: 15 MG/DL (ref 8–23)
CALCIUM SERPL-MCNC: 9.3 MG/DL (ref 8.6–10.2)
CHLORIDE BLD-SCNC: 96 MEQ/L (ref 98–107)
CO2: 24 MEQ/L (ref 22–29)
CREAT SERPL-MCNC: 1.08 MG/DL (ref 0.5–0.9)
CREATININE URINE: 121.6 MG/DL
EOSINOPHILS ABSOLUTE: 0.2 K/UL (ref 0–0.7)
EOSINOPHILS RELATIVE PERCENT: 2.2 %
GFR AFRICAN AMERICAN: 58.5
GFR NON-AFRICAN AMERICAN: 48.3
GLOBULIN: 3.7 G/DL (ref 2.3–3.5)
GLUCOSE BLD-MCNC: 112 MG/DL (ref 74–109)
HBA1C MFR BLD: 7.6 % (ref 4.8–5.9)
HCT VFR BLD CALC: 36 % (ref 37–47)
HEMOGLOBIN: 12 G/DL (ref 12–16)
LYMPHOCYTES ABSOLUTE: 2.3 K/UL (ref 1–4.8)
LYMPHOCYTES RELATIVE PERCENT: 21.3 %
MCH RBC QN AUTO: 27.6 PG (ref 27–31.3)
MCHC RBC AUTO-ENTMCNC: 33.3 % (ref 33–37)
MCV RBC AUTO: 82.8 FL (ref 82–100)
MICROALBUMIN UR-MCNC: 2.3 MG/DL
MICROALBUMIN/CREAT UR-RTO: 18.9 MG/G (ref 0–30)
MONOCYTES ABSOLUTE: 0.9 K/UL (ref 0.2–0.8)
MONOCYTES RELATIVE PERCENT: 8.1 %
NEUTROPHILS ABSOLUTE: 7.4 K/UL (ref 1.4–6.5)
NEUTROPHILS RELATIVE PERCENT: 67.8 %
PDW BLD-RTO: 16 % (ref 11.5–14.5)
PLATELET # BLD: 408 K/UL (ref 130–400)
POTASSIUM SERPL-SCNC: 4.4 MEQ/L (ref 3.5–5.1)
RBC # BLD: 4.34 M/UL (ref 4.2–5.4)
SODIUM BLD-SCNC: 136 MEQ/L (ref 132–144)
TOTAL PROTEIN: 7.9 G/DL (ref 6.4–8.1)
WBC # BLD: 11 K/UL (ref 4.8–10.8)

## 2018-08-23 PROCEDURE — 80053 COMPREHEN METABOLIC PANEL: CPT

## 2018-08-23 PROCEDURE — 82570 ASSAY OF URINE CREATININE: CPT

## 2018-08-23 PROCEDURE — 36415 COLL VENOUS BLD VENIPUNCTURE: CPT

## 2018-08-23 PROCEDURE — 83036 HEMOGLOBIN GLYCOSYLATED A1C: CPT

## 2018-08-23 PROCEDURE — 85025 COMPLETE CBC W/AUTO DIFF WBC: CPT

## 2018-08-23 PROCEDURE — 82043 UR ALBUMIN QUANTITATIVE: CPT

## 2018-08-23 NOTE — TELEPHONE ENCOUNTER
Please call patient to inform her that blood pressure is now within the normal range. She should continue with current medications at their current doses and we will continue to follow blood pressure values over time at routine office visits.

## 2018-08-27 ENCOUNTER — CARE COORDINATION (OUTPATIENT)
Dept: CARE COORDINATION | Age: 83
End: 2018-08-27

## 2018-11-08 ENCOUNTER — CARE COORDINATION (OUTPATIENT)
Dept: CARE COORDINATION | Age: 83
End: 2018-11-08

## 2018-11-17 DIAGNOSIS — I10 ESSENTIAL HYPERTENSION: ICD-10-CM

## 2018-11-19 RX ORDER — ENALAPRIL MALEATE 10 MG/1
TABLET ORAL
Qty: 90 TABLET | Refills: 0 | Status: SHIPPED | OUTPATIENT
Start: 2018-11-19 | End: 2019-03-25 | Stop reason: SDUPTHER

## 2018-12-01 DIAGNOSIS — E11.8 TYPE 2 DIABETES MELLITUS WITH COMPLICATION, WITHOUT LONG-TERM CURRENT USE OF INSULIN (HCC): ICD-10-CM

## 2018-12-03 RX ORDER — GLIPIZIDE 10 MG/1
10 TABLET ORAL DAILY
Qty: 30 TABLET | Refills: 0 | Status: SHIPPED | OUTPATIENT
Start: 2018-12-03 | End: 2019-02-04 | Stop reason: SDUPTHER

## 2018-12-03 NOTE — TELEPHONE ENCOUNTER
A 30 day refill has been sent to her local pharmacy. Patient is overdue for chronic follow-up visit.   Please help her schedule follow-up visit in the next month

## 2018-12-31 DIAGNOSIS — F03.90 DEMENTIA WITHOUT BEHAVIORAL DISTURBANCE, UNSPECIFIED DEMENTIA TYPE: Chronic | ICD-10-CM

## 2018-12-31 RX ORDER — DONEPEZIL HYDROCHLORIDE 10 MG/1
10 TABLET, FILM COATED ORAL NIGHTLY
Qty: 30 TABLET | Refills: 0 | Status: SHIPPED | OUTPATIENT
Start: 2018-12-31 | End: 2019-02-04 | Stop reason: SDUPTHER

## 2019-01-04 ENCOUNTER — CARE COORDINATION (OUTPATIENT)
Dept: CARE COORDINATION | Age: 84
End: 2019-01-04

## 2019-01-17 ENCOUNTER — OFFICE VISIT (OUTPATIENT)
Dept: FAMILY MEDICINE CLINIC | Age: 84
End: 2019-01-17
Payer: MEDICARE

## 2019-01-17 VITALS
OXYGEN SATURATION: 98 % | HEIGHT: 59 IN | SYSTOLIC BLOOD PRESSURE: 138 MMHG | TEMPERATURE: 97.8 F | BODY MASS INDEX: 30.74 KG/M2 | RESPIRATION RATE: 16 BRPM | DIASTOLIC BLOOD PRESSURE: 82 MMHG | HEART RATE: 70 BPM

## 2019-01-17 DIAGNOSIS — N18.30 CKD (CHRONIC KIDNEY DISEASE) STAGE 3, GFR 30-59 ML/MIN (HCC): Chronic | ICD-10-CM

## 2019-01-17 DIAGNOSIS — M54.50 ACUTE LOW BACK PAIN WITHOUT SCIATICA, UNSPECIFIED BACK PAIN LATERALITY: ICD-10-CM

## 2019-01-17 DIAGNOSIS — E11.8 TYPE 2 DIABETES MELLITUS WITH COMPLICATION, WITHOUT LONG-TERM CURRENT USE OF INSULIN (HCC): Chronic | ICD-10-CM

## 2019-01-17 DIAGNOSIS — R82.90 ABNORMAL URINE ODOR: ICD-10-CM

## 2019-01-17 DIAGNOSIS — R53.83 FATIGUE, UNSPECIFIED TYPE: ICD-10-CM

## 2019-01-17 DIAGNOSIS — R39.15 URINARY URGENCY: Primary | ICD-10-CM

## 2019-01-17 PROCEDURE — 99213 OFFICE O/P EST LOW 20 MIN: CPT | Performed by: FAMILY MEDICINE

## 2019-01-17 RX ORDER — MEMANTINE HYDROCHLORIDE 10 MG/1
TABLET ORAL
COMMUNITY
Start: 2019-01-03 | End: 2020-10-09 | Stop reason: SDUPTHER

## 2019-01-17 RX ORDER — SULFAMETHOXAZOLE AND TRIMETHOPRIM 800; 160 MG/1; MG/1
1 TABLET ORAL 2 TIMES DAILY
Qty: 14 TABLET | Refills: 0 | Status: SHIPPED | OUTPATIENT
Start: 2019-01-17 | End: 2019-01-24

## 2019-01-17 RX ORDER — CITALOPRAM 20 MG/1
TABLET ORAL
COMMUNITY
Start: 2019-01-02 | End: 2019-01-30 | Stop reason: SDUPTHER

## 2019-01-17 ASSESSMENT — ENCOUNTER SYMPTOMS
DIARRHEA: 0
BLOOD IN STOOL: 0
COUGH: 0
NAUSEA: 0
VOMITING: 0
ABDOMINAL PAIN: 0
WHEEZING: 0
CONSTIPATION: 0
ANAL BLEEDING: 0
BACK PAIN: 1
SHORTNESS OF BREATH: 0

## 2019-01-18 ENCOUNTER — TELEPHONE (OUTPATIENT)
Dept: FAMILY MEDICINE CLINIC | Age: 84
End: 2019-01-18

## 2019-01-18 ENCOUNTER — HOSPITAL ENCOUNTER (OUTPATIENT)
Dept: LAB | Age: 84
Discharge: HOME | End: 2019-01-18
Payer: MEDICARE

## 2019-01-18 ENCOUNTER — HOSPITAL ENCOUNTER (OUTPATIENT)
Age: 84
Setting detail: SPECIMEN
Discharge: HOME | End: 2019-01-18
Payer: MEDICARE

## 2019-01-18 DIAGNOSIS — R53.83 FATIGUE, UNSPECIFIED TYPE: ICD-10-CM

## 2019-01-18 DIAGNOSIS — R82.90 ABNORMAL URINE ODOR: ICD-10-CM

## 2019-01-18 DIAGNOSIS — N18.30 CKD (CHRONIC KIDNEY DISEASE) STAGE 3, GFR 30-59 ML/MIN (HCC): Chronic | ICD-10-CM

## 2019-01-18 DIAGNOSIS — E11.8 TYPE 2 DIABETES MELLITUS WITH COMPLICATION, WITHOUT LONG-TERM CURRENT USE OF INSULIN (HCC): Chronic | ICD-10-CM

## 2019-01-18 DIAGNOSIS — M54.50 ACUTE LOW BACK PAIN WITHOUT SCIATICA, UNSPECIFIED BACK PAIN LATERALITY: ICD-10-CM

## 2019-01-18 DIAGNOSIS — R39.15 URINARY URGENCY: ICD-10-CM

## 2019-01-18 LAB
ALBUMIN SERPL-MCNC: 4.1 G/DL (ref 3.9–4.9)
ALP BLD-CCNC: 68 U/L (ref 40–130)
ALT SERPL-CCNC: 9 U/L (ref 0–33)
ANION GAP SERPL CALCULATED.3IONS-SCNC: 13 MEQ/L (ref 7–13)
AST SERPL-CCNC: 19 U/L (ref 0–35)
BACTERIA: ABNORMAL /HPF
BASOPHILS ABSOLUTE: 0.1 K/UL (ref 0–0.2)
BASOPHILS RELATIVE PERCENT: 0.9 %
BILIRUB SERPL-MCNC: <0.2 MG/DL (ref 0–1.2)
BILIRUBIN URINE: NEGATIVE
BLOOD, URINE: ABNORMAL
BUN BLDV-MCNC: 23 MG/DL (ref 8–23)
CALCIUM SERPL-MCNC: 9.2 MG/DL (ref 8.6–10.2)
CHLORIDE BLD-SCNC: 104 MEQ/L (ref 98–107)
CLARITY: CLEAR
CO2: 25 MEQ/L (ref 22–29)
COLOR: YELLOW
CREAT SERPL-MCNC: 1.24 MG/DL (ref 0.5–0.9)
EOSINOPHILS ABSOLUTE: 0.1 K/UL (ref 0–0.7)
EOSINOPHILS RELATIVE PERCENT: 1.6 %
EPITHELIAL CELLS, UA: ABNORMAL /HPF (ref 0–5)
GFR AFRICAN AMERICAN: 49.8
GFR NON-AFRICAN AMERICAN: 41.2
GLOBULIN: 3.5 G/DL (ref 2.3–3.5)
GLUCOSE BLD-MCNC: 232 MG/DL (ref 74–109)
GLUCOSE URINE: NEGATIVE MG/DL
HBA1C MFR BLD: 7.6 % (ref 4.8–5.9)
HCT VFR BLD CALC: 35.7 % (ref 37–47)
HEMOGLOBIN: 11.6 G/DL (ref 12–16)
HYALINE CASTS: ABNORMAL /HPF (ref 0–5)
KETONES, URINE: NEGATIVE MG/DL
LEUKOCYTE ESTERASE, URINE: ABNORMAL
LYMPHOCYTES ABSOLUTE: 2.2 K/UL (ref 1–4.8)
LYMPHOCYTES RELATIVE PERCENT: 24.3 %
MCH RBC QN AUTO: 27.3 PG (ref 27–31.3)
MCHC RBC AUTO-ENTMCNC: 32.4 % (ref 33–37)
MCV RBC AUTO: 84.2 FL (ref 82–100)
MONOCYTES ABSOLUTE: 0.7 K/UL (ref 0.2–0.8)
MONOCYTES RELATIVE PERCENT: 7.9 %
NEUTROPHILS ABSOLUTE: 5.8 K/UL (ref 1.4–6.5)
NEUTROPHILS RELATIVE PERCENT: 65.3 %
NITRITE, URINE: POSITIVE
PDW BLD-RTO: 15.6 % (ref 11.5–14.5)
PH UA: 5 (ref 5–9)
PLATELET # BLD: 374 K/UL (ref 130–400)
POTASSIUM SERPL-SCNC: 4.8 MEQ/L (ref 3.5–5.1)
PROTEIN UA: NEGATIVE MG/DL
RBC # BLD: 4.24 M/UL (ref 4.2–5.4)
RBC UA: ABNORMAL /HPF (ref 0–5)
SODIUM BLD-SCNC: 142 MEQ/L (ref 132–144)
SPECIFIC GRAVITY UA: 1.02 (ref 1–1.03)
TOTAL PROTEIN: 7.6 G/DL (ref 6.4–8.1)
TSH SERPL DL<=0.05 MIU/L-ACNC: 2.04 UIU/ML (ref 0.27–4.2)
UROBILINOGEN, URINE: 0.2 E.U./DL
WBC # BLD: 8.9 K/UL (ref 4.8–10.8)
WBC UA: ABNORMAL /HPF (ref 0–5)

## 2019-01-18 PROCEDURE — 87077 CULTURE AEROBIC IDENTIFY: CPT

## 2019-01-18 PROCEDURE — 36415 COLL VENOUS BLD VENIPUNCTURE: CPT

## 2019-01-18 PROCEDURE — 87186 SC STD MICRODIL/AGAR DIL: CPT

## 2019-01-18 PROCEDURE — 83036 HEMOGLOBIN GLYCOSYLATED A1C: CPT

## 2019-01-18 PROCEDURE — 85025 COMPLETE CBC W/AUTO DIFF WBC: CPT

## 2019-01-18 PROCEDURE — 81001 URINALYSIS AUTO W/SCOPE: CPT

## 2019-01-18 PROCEDURE — 87086 URINE CULTURE/COLONY COUNT: CPT

## 2019-01-18 PROCEDURE — 84443 ASSAY THYROID STIM HORMONE: CPT

## 2019-01-18 PROCEDURE — 80053 COMPREHEN METABOLIC PANEL: CPT

## 2019-01-21 ENCOUNTER — TELEPHONE (OUTPATIENT)
Dept: FAMILY MEDICINE CLINIC | Age: 84
End: 2019-01-21

## 2019-01-21 LAB
ORGANISM: ABNORMAL
URINE CULTURE, ROUTINE: ABNORMAL
URINE CULTURE, ROUTINE: ABNORMAL

## 2019-01-24 ENCOUNTER — OFFICE VISIT (OUTPATIENT)
Dept: FAMILY MEDICINE CLINIC | Age: 84
End: 2019-01-24
Payer: MEDICARE

## 2019-01-24 VITALS
BODY MASS INDEX: 30.74 KG/M2 | HEIGHT: 59 IN | HEART RATE: 63 BPM | DIASTOLIC BLOOD PRESSURE: 80 MMHG | TEMPERATURE: 98.5 F | SYSTOLIC BLOOD PRESSURE: 138 MMHG | OXYGEN SATURATION: 99 %

## 2019-01-24 DIAGNOSIS — R26.81 UNSTEADY GAIT: ICD-10-CM

## 2019-01-24 DIAGNOSIS — E11.8 TYPE 2 DIABETES MELLITUS WITH COMPLICATION, WITHOUT LONG-TERM CURRENT USE OF INSULIN (HCC): Primary | Chronic | ICD-10-CM

## 2019-01-24 DIAGNOSIS — I48.91 ATRIAL FIBRILLATION, UNSPECIFIED TYPE (HCC): Chronic | ICD-10-CM

## 2019-01-24 DIAGNOSIS — K21.9 GASTROESOPHAGEAL REFLUX DISEASE, ESOPHAGITIS PRESENCE NOT SPECIFIED: Chronic | ICD-10-CM

## 2019-01-24 DIAGNOSIS — N18.30 CKD (CHRONIC KIDNEY DISEASE) STAGE 3, GFR 30-59 ML/MIN (HCC): Chronic | ICD-10-CM

## 2019-01-24 DIAGNOSIS — F32.A DEPRESSION, UNSPECIFIED DEPRESSION TYPE: Chronic | ICD-10-CM

## 2019-01-24 DIAGNOSIS — E78.5 HYPERLIPIDEMIA, UNSPECIFIED HYPERLIPIDEMIA TYPE: Chronic | ICD-10-CM

## 2019-01-24 DIAGNOSIS — I10 ESSENTIAL HYPERTENSION: Chronic | ICD-10-CM

## 2019-01-24 DIAGNOSIS — M51.36 DDD (DEGENERATIVE DISC DISEASE), LUMBAR: Chronic | ICD-10-CM

## 2019-01-24 DIAGNOSIS — F03.91 DEMENTIA WITH BEHAVIORAL DISTURBANCE, UNSPECIFIED DEMENTIA TYPE: Chronic | ICD-10-CM

## 2019-01-24 PROCEDURE — 99214 OFFICE O/P EST MOD 30 MIN: CPT | Performed by: FAMILY MEDICINE

## 2019-01-24 ASSESSMENT — ENCOUNTER SYMPTOMS
WHEEZING: 0
ANAL BLEEDING: 0
COUGH: 0
CHEST TIGHTNESS: 0
NAUSEA: 0
ABDOMINAL PAIN: 0
CONSTIPATION: 0
VOMITING: 0
DIARRHEA: 0
BLOOD IN STOOL: 0
BACK PAIN: 1
SHORTNESS OF BREATH: 0

## 2019-02-01 RX ORDER — OMEPRAZOLE 20 MG/1
CAPSULE, DELAYED RELEASE ORAL
Qty: 90 CAPSULE | Refills: 0 | Status: SHIPPED | OUTPATIENT
Start: 2019-02-01 | End: 2019-05-12 | Stop reason: SDUPTHER

## 2019-02-01 RX ORDER — CITALOPRAM 20 MG/1
20 TABLET ORAL DAILY
Qty: 30 TABLET | Refills: 0 | Status: SHIPPED | OUTPATIENT
Start: 2019-02-01 | End: 2020-07-20

## 2019-02-04 DIAGNOSIS — E11.8 TYPE 2 DIABETES MELLITUS WITH COMPLICATION, WITHOUT LONG-TERM CURRENT USE OF INSULIN (HCC): ICD-10-CM

## 2019-02-04 RX ORDER — GLIPIZIDE 10 MG/1
TABLET ORAL
Qty: 90 TABLET | Refills: 1 | Status: SHIPPED | OUTPATIENT
Start: 2019-02-04 | End: 2019-07-24

## 2019-03-01 DIAGNOSIS — F32.A DEPRESSION, UNSPECIFIED DEPRESSION TYPE: Chronic | ICD-10-CM

## 2019-03-25 DIAGNOSIS — I10 ESSENTIAL HYPERTENSION: ICD-10-CM

## 2019-03-25 RX ORDER — ENALAPRIL MALEATE 10 MG/1
10 TABLET ORAL DAILY
Qty: 90 TABLET | Refills: 1 | Status: SHIPPED | OUTPATIENT
Start: 2019-03-25 | End: 2019-10-06 | Stop reason: SDUPTHER

## 2019-05-09 ENCOUNTER — HOSPITAL ENCOUNTER (OUTPATIENT)
Dept: LAB | Age: 84
Discharge: HOME | End: 2019-05-09
Payer: MEDICARE

## 2019-05-09 LAB
BACTERIA: ABNORMAL /HPF
BILIRUBIN URINE: NEGATIVE
BLOOD, URINE: NEGATIVE
CLARITY: CLEAR
COLOR: YELLOW
EPITHELIAL CELLS, UA: ABNORMAL /HPF (ref 0–5)
GLUCOSE URINE: NEGATIVE MG/DL
HYALINE CASTS: ABNORMAL /HPF (ref 0–5)
KETONES, URINE: ABNORMAL MG/DL
LEUKOCYTE ESTERASE, URINE: ABNORMAL
NITRITE, URINE: NEGATIVE
PH UA: 5 (ref 5–9)
PROTEIN UA: ABNORMAL MG/DL
RBC UA: ABNORMAL /HPF (ref 0–5)
SPECIFIC GRAVITY UA: 1.02 (ref 1–1.03)
UROBILINOGEN, URINE: 0.2 E.U./DL
VITAMIN B-12: 376 PG/ML (ref 232–1245)
VITAMIN D 25-HYDROXY: 20.9 NG/ML (ref 30–100)
WBC UA: ABNORMAL /HPF (ref 0–5)

## 2019-05-09 PROCEDURE — 81001 URINALYSIS AUTO W/SCOPE: CPT

## 2019-05-09 PROCEDURE — 87077 CULTURE AEROBIC IDENTIFY: CPT

## 2019-05-09 PROCEDURE — 36415 COLL VENOUS BLD VENIPUNCTURE: CPT

## 2019-05-09 PROCEDURE — 87186 SC STD MICRODIL/AGAR DIL: CPT

## 2019-05-09 PROCEDURE — 87086 URINE CULTURE/COLONY COUNT: CPT

## 2019-05-09 PROCEDURE — 82607 VITAMIN B-12: CPT

## 2019-05-09 PROCEDURE — 82306 VITAMIN D 25 HYDROXY: CPT

## 2019-05-12 LAB
ORGANISM: ABNORMAL
URINE CULTURE, ROUTINE: ABNORMAL
URINE CULTURE, ROUTINE: ABNORMAL

## 2019-05-13 RX ORDER — OMEPRAZOLE 20 MG/1
CAPSULE, DELAYED RELEASE ORAL
Qty: 90 CAPSULE | Refills: 0 | Status: SHIPPED | OUTPATIENT
Start: 2019-05-13 | End: 2019-08-18 | Stop reason: SDUPTHER

## 2019-05-20 RX ORDER — AMLODIPINE BESYLATE 5 MG/1
5 TABLET ORAL DAILY
Qty: 90 TABLET | Refills: 1 | Status: SHIPPED | OUTPATIENT
Start: 2019-05-20 | End: 2020-02-03

## 2019-05-20 NOTE — TELEPHONE ENCOUNTER
Pharmacy sent a fax requesting medication refill.  Please approve or deny this request.    Rx requested:  Requested Prescriptions     Pending Prescriptions Disp Refills    amLODIPine (NORVASC) 5 MG tablet 90 tablet 1     Sig: Take 1 tablet by mouth daily         Last Office Visit:   Visit date not found      Next Visit Date:  Future Appointments   Date Time Provider Sofia Ramírez   5/30/2019  4:00 PM Mohini Brown MD [facility]

## 2019-05-30 ENCOUNTER — OFFICE VISIT (OUTPATIENT)
Dept: FAMILY MEDICINE CLINIC | Age: 84
End: 2019-05-30
Payer: MEDICARE

## 2019-05-30 ENCOUNTER — HOSPITAL ENCOUNTER (OUTPATIENT)
Age: 84
Setting detail: SPECIMEN
Discharge: HOME | End: 2019-05-30
Payer: MEDICARE

## 2019-05-30 VITALS
SYSTOLIC BLOOD PRESSURE: 146 MMHG | HEART RATE: 59 BPM | TEMPERATURE: 98.4 F | HEIGHT: 59 IN | BODY MASS INDEX: 30.74 KG/M2 | DIASTOLIC BLOOD PRESSURE: 90 MMHG | OXYGEN SATURATION: 94 %

## 2019-05-30 DIAGNOSIS — R35.0 URINARY FREQUENCY: Primary | ICD-10-CM

## 2019-05-30 DIAGNOSIS — R39.15 URINARY URGENCY: ICD-10-CM

## 2019-05-30 DIAGNOSIS — R35.0 URINARY FREQUENCY: ICD-10-CM

## 2019-05-30 LAB
BILIRUBIN, POC: ABNORMAL
BLOOD URINE, POC: ABNORMAL
CLARITY, POC: ABNORMAL
COLOR, POC: YELLOW
GLUCOSE URINE, POC: ABNORMAL
KETONES, POC: ABNORMAL
LEUKOCYTE EST, POC: ABNORMAL
NITRITE, POC: ABNORMAL
PH, POC: 6
PROTEIN, POC: ABNORMAL
SPECIFIC GRAVITY, POC: 1.03
UROBILINOGEN, POC: ABNORMAL

## 2019-05-30 PROCEDURE — 81001 URINALYSIS AUTO W/SCOPE: CPT

## 2019-05-30 PROCEDURE — G8417 CALC BMI ABV UP PARAM F/U: HCPCS | Performed by: FAMILY MEDICINE

## 2019-05-30 PROCEDURE — G8399 PT W/DXA RESULTS DOCUMENT: HCPCS | Performed by: FAMILY MEDICINE

## 2019-05-30 PROCEDURE — 1036F TOBACCO NON-USER: CPT | Performed by: FAMILY MEDICINE

## 2019-05-30 PROCEDURE — 87186 SC STD MICRODIL/AGAR DIL: CPT

## 2019-05-30 PROCEDURE — 87077 CULTURE AEROBIC IDENTIFY: CPT

## 2019-05-30 PROCEDURE — 1123F ACP DISCUSS/DSCN MKR DOCD: CPT | Performed by: FAMILY MEDICINE

## 2019-05-30 PROCEDURE — 81002 URINALYSIS NONAUTO W/O SCOPE: CPT | Performed by: FAMILY MEDICINE

## 2019-05-30 PROCEDURE — 99213 OFFICE O/P EST LOW 20 MIN: CPT | Performed by: FAMILY MEDICINE

## 2019-05-30 PROCEDURE — 1090F PRES/ABSN URINE INCON ASSESS: CPT | Performed by: FAMILY MEDICINE

## 2019-05-30 PROCEDURE — 87086 URINE CULTURE/COLONY COUNT: CPT

## 2019-05-30 PROCEDURE — G8427 DOCREV CUR MEDS BY ELIG CLIN: HCPCS | Performed by: FAMILY MEDICINE

## 2019-05-30 PROCEDURE — 4040F PNEUMOC VAC/ADMIN/RCVD: CPT | Performed by: FAMILY MEDICINE

## 2019-05-30 RX ORDER — AMOXICILLIN AND CLAVULANATE POTASSIUM 875; 125 MG/1; MG/1
1 TABLET, FILM COATED ORAL 2 TIMES DAILY
Qty: 20 TABLET | Refills: 0 | Status: SHIPPED | OUTPATIENT
Start: 2019-05-30 | End: 2019-06-09

## 2019-05-30 ASSESSMENT — ENCOUNTER SYMPTOMS
WHEEZING: 0
SHORTNESS OF BREATH: 0
VOMITING: 0
DIARRHEA: 0
ABDOMINAL PAIN: 0
CHEST TIGHTNESS: 0
NAUSEA: 0

## 2019-05-30 NOTE — PROGRESS NOTES
2013     Past Surgical History:   Procedure Laterality Date    APPENDECTOMY  1974    BLEPHAROPLASTY Bilateral     cosmetic    CATARACT REMOVAL Left     OU    COLONOSCOPY  2006    ENDOSCOPY, COLON, DIAGNOSTIC      HYSTERECTOMY  1974    KNEE ARTHROSCOPY Left 2007    left    LUMBAR LAMINECTOMY  1994    with disc surgery    MASTECTOMY Left 2010    left    OVARY REMOVAL Right 1974    only right    MT HEMORRHOIDECTOMY,INT/EXT,1 COLUMN/GROUP N/A 2018    HEMORRHOIDECTOMY performed by Radha Lyon MD at [address] Right     right    ROTATOR CUFF REPAIR Left     left    TOE AMPUTATION Left 1969    1st and 2nd toes distal tips d/t  accident    TONSILLECTOMY  1939    UTERINE FIBROID SURGERY  1964     Family History   Problem Relation Age of Onset    Heart Disease Mother         enlarged heart    Cancer Father         bone    No Known Problems Sister      Social History     Socioeconomic History    Marital status:       Spouse name: Not on file    Number of children: Not on file    Years of education: Not on file    Highest education level: Not on file   Occupational History    Occupation: Retired   Social Needs    Financial resource strain: Not on file    Food insecurity:     Worry: Not on file     Inability: Not on file   [employer] needs:     Medical: Not on file     Non-medical: Not on file   Tobacco Use    Smoking status: Never Smoker    Smokeless tobacco: Never Used   Substance and Sexual Activity    Alcohol use: No    Drug use: No    Sexual activity: Not Currently   Lifestyle    Physical activity:     Days per week: Not on file     Minutes per session: Not on file    Stress: Not on file   Relationships    Social connections:     Talks on phone: Not on file     Gets together: Not on file     Attends Sabianist service: Not on file     Active member of club or organization: Not on file     Attends meetings of clubs or organizations: Not on file     Relationship status: Not on file    Intimate partner violence:     Fear of current or ex partner: Not on file     Emotionally abused: Not on file     Physically abused: Not on file     Forced sexual activity: Not on file   Other Topics Concern    Not on file   Social History Narrative    Lives with a caregiver, son is POA and lives in [place]. Son checks on her in the evenings.          1 cat    1 dog     Current Outpatient Medications on File Prior to Visit   Medication Sig Dispense Refill    amLODIPine (NORVASC) 5 MG tablet Take 1 tablet by mouth daily 90 tablet 1    omeprazole (PRILOSEC) 20 MG delayed release capsule TAKE 1 CAPSULE BY MOUTH ONCE DAILY 90 capsule 0    enalapril (VASOTEC) 10 MG tablet Take 1 tablet by mouth daily 90 tablet 1    sertraline (ZOLOFT) 50 MG tablet Take 1 tablet by mouth daily 90 tablet 1    glipiZIDE (GLUCOTROL) 10 MG tablet TAKE 1 TABLET BY MOUTH ONCE DAILY 90 tablet 1    donepezil (ARICEPT) 10 MG tablet Take 1 tablet by mouth nightly 90 tablet 1    citalopram (CELEXA) 20 MG tablet Take 1 tablet by mouth daily 30 tablet 0    traZODone (DESYREL) 50 MG tablet Take 1 tablet by mouth nightly 30 tablet 5    memantine (NAMENDA) 10 MG tablet Take 1/2 tablet daily x 7 days,then 1/2 tablet 2x daily x 7 days, then 1/2 tab in AM & 1tab @ dinner x7 days then 1 tab 2x daily thereafter      TRADJENTA 5 MG tablet Take 1 tablet by mouth daily 90 tablet 3    Handicap Placard MISC by Does not apply route DX: Dementia (F03.90), Type 2 Diabetes (E11.8), Atrial Fibrillation (I48.91)     EXPIRES: 05/2023 1 each 0    warfarin (COUMADIN) 2.5 MG tablet Take 2.5 mg by mouth daily Take daily as directed per physician based on INR results      docusate sodium (STOOL SOFTENER) 100 MG capsule Take 100 mg by mouth daily       Psyllium ([employer] NATURAL FIBER SUPPLEMENT PO) Take 1 tablet by mouth daily      ferrous sulfate (FE TABS) 325 (65 Fe) MG EC tablet Take 1 tablet by mouth daily (with breakfast) 90 tablet 1    acetaminophen (APAP EXTRA STRENGTH) 500 MG tablet Take 2 tablets by mouth 3 times daily as needed for Pain 60 tablet 3    Blood Glucose Monitoring Suppl TROY Use TID 1 Device 0    Glucose Blood (BLOOD GLUCOSE TEST STRIPS) STRP Use  strip 11    Lancets MISC 1 each by Does not apply route 3 times daily 100 each 11    Blood Glucose Monitoring Suppl (FREESTYLE FREEDOM) KIT Test once daily. DX: E11.8 1 kit 0    melatonin 3 MG TABS tablet Take 1 tablet by mouth nightly 30 tablet 3    sotalol (BETAPACE) 80 MG tablet Take 1 tablet by mouth 2 times daily (Patient taking differently: Take 80 mg by mouth 2 times daily Take 1 tablet twice daily) 180 tablet 1    Calcium Carbonate-Vitamin D (CALCIUM + D PO) Take 1 tablet by mouth daily      Compression Stockings MISC by Does not apply route. Knee high 1 each 0    atenolol (TENORMIN) 50 MG tablet Take 50 mg by mouth daily Take 1 tablet daily      atorvastatin (LIPITOR) 10 MG tablet Take 10 mg by mouth daily At bedtime      aspirin 81 MG tablet Take 81 mg by mouth daily. No current facility-administered medications on file prior to visit. Allergies:  Patient has no known allergies. Review of Systems   Constitutional: Positive for appetite change and fatigue. Negative for chills, diaphoresis, fever and unexpected weight change. Respiratory: Negative for chest tightness, shortness of breath and wheezing. Cardiovascular: Negative for chest pain and palpitations. Gastrointestinal: Negative for abdominal pain, diarrhea, nausea and vomiting. Genitourinary: Positive for frequency and urgency. Negative for dysuria, flank pain, hematuria, pelvic pain, vaginal bleeding and vaginal discharge. Musculoskeletal: Negative for myalgias. Skin: Negative for rash. Neurological: Negative for dizziness, syncope, light-headedness and headaches.        Objective:     BP (!) 146/90   Pulse 59   Temp 98.4 °F (36.9 °C) (Temporal)   Ht 4' 11\" (1.499 m)   SpO2 94%   Breastfeeding? No   BMI 30.74 kg/m²     Physical Exam   Constitutional: She appears well-developed and well-nourished. No distress. Cardiovascular: Normal rate and regular rhythm. Pulmonary/Chest: Effort normal and breath sounds normal. No respiratory distress. She has no wheezes. Abdominal: Soft. Bowel sounds are normal. She exhibits no distension. There is no tenderness. There is no rebound, no guarding and no CVA tenderness. Neurological: She is alert. Skin: Skin is warm. No rash noted. She is not diaphoretic. Ortho Exam (If Applicable)    Results for POC orders placed in visit on 05/30/19   POCT Urinalysis no Micro   Result Value Ref Range    Color, UA YELLOW     Clarity, UA CLOUDY     Glucose, UA POC -     Bilirubin, UA -     Ketones, UA -     Spec Grav, UA 1.030     Blood, UA POC + -     pH, UA 6.0     Protein, UA POC -     Urobilinogen, UA -     Leukocytes, UA -     Nitrite, UA +        Assessment & Plan:      1. Urinary frequency  Will treat empirically for persistent UTI based on presentation and urine sample today in the office showing nitrites and blood. Will have patient use 10 day course of antibiotic and stay well-hydrated at home. Caregiver instructed to take patient to the emergency room for any noted fevers, flank pain, decreased urination, or acute change in mental status. - POCT Urinalysis no Micro  - Urinalysis; Future  - Urine Culture; Future  - amoxicillin-clavulanate (AUGMENTIN) 875-125 MG per tablet; Take 1 tablet by mouth 2 times daily for 10 days  Dispense: 20 tablet; Refill: 0    2. Urinary urgency  See above    - POCT Urinalysis no Micro  - Urinalysis; Future  - Urine Culture; Future  - amoxicillin-clavulanate (AUGMENTIN) 875-125 MG per tablet; Take 1 tablet by mouth 2 times daily for 10 days  Dispense: 20 tablet;  Refill: 0      Modified Medications    No medications on file          New Prescriptions AMOXICILLIN-CLAVULANATE (AUGMENTIN) 875-125 MG PER TABLET    Take 1 tablet by mouth 2 times daily for 10 days        There are no discontinued medications. Return for keep next scheduled visit June 2019.     Poncho Eller MD

## 2019-05-31 LAB
BACTERIA: ABNORMAL /HPF
BILIRUBIN URINE: NEGATIVE
BLOOD, URINE: NEGATIVE
CLARITY: ABNORMAL
COLOR: YELLOW
EPITHELIAL CELLS, UA: ABNORMAL /HPF (ref 0–5)
GLUCOSE URINE: NEGATIVE MG/DL
HYALINE CASTS: ABNORMAL /HPF (ref 0–5)
KETONES, URINE: ABNORMAL MG/DL
LEUKOCYTE ESTERASE, URINE: ABNORMAL
NITRITE, URINE: POSITIVE
PH UA: 5.5 (ref 5–9)
PROTEIN UA: NEGATIVE MG/DL
RBC UA: ABNORMAL /HPF (ref 0–2)
SPECIFIC GRAVITY UA: 1.02 (ref 1–1.03)
UROBILINOGEN, URINE: 0.2 E.U./DL
WBC UA: ABNORMAL /HPF (ref 0–5)

## 2019-06-02 LAB
ORGANISM: ABNORMAL
URINE CULTURE, ROUTINE: ABNORMAL
URINE CULTURE, ROUTINE: ABNORMAL

## 2019-06-28 ENCOUNTER — OFFICE VISIT (OUTPATIENT)
Dept: FAMILY MEDICINE CLINIC | Age: 84
End: 2019-06-28
Payer: MEDICARE

## 2019-06-28 VITALS
HEIGHT: 59 IN | TEMPERATURE: 97.7 F | BODY MASS INDEX: 30.74 KG/M2 | HEART RATE: 56 BPM | SYSTOLIC BLOOD PRESSURE: 124 MMHG | DIASTOLIC BLOOD PRESSURE: 70 MMHG | OXYGEN SATURATION: 96 %

## 2019-06-28 DIAGNOSIS — E78.5 HYPERLIPIDEMIA, UNSPECIFIED HYPERLIPIDEMIA TYPE: Chronic | ICD-10-CM

## 2019-06-28 DIAGNOSIS — I10 ESSENTIAL HYPERTENSION: Chronic | ICD-10-CM

## 2019-06-28 DIAGNOSIS — N18.30 CKD (CHRONIC KIDNEY DISEASE) STAGE 3, GFR 30-59 ML/MIN (HCC): Chronic | ICD-10-CM

## 2019-06-28 DIAGNOSIS — E55.9 VITAMIN D DEFICIENCY: ICD-10-CM

## 2019-06-28 DIAGNOSIS — Z00.00 ROUTINE GENERAL MEDICAL EXAMINATION AT A HEALTH CARE FACILITY: Primary | ICD-10-CM

## 2019-06-28 DIAGNOSIS — F32.A DEPRESSION, UNSPECIFIED DEPRESSION TYPE: Chronic | ICD-10-CM

## 2019-06-28 DIAGNOSIS — E11.8 TYPE 2 DIABETES MELLITUS WITH COMPLICATION, WITHOUT LONG-TERM CURRENT USE OF INSULIN (HCC): Chronic | ICD-10-CM

## 2019-06-28 DIAGNOSIS — Z23 NEED FOR VACCINATION: ICD-10-CM

## 2019-06-28 DIAGNOSIS — F03.91 DEMENTIA WITH BEHAVIORAL DISTURBANCE, UNSPECIFIED DEMENTIA TYPE: Chronic | ICD-10-CM

## 2019-06-28 DIAGNOSIS — M81.0 OSTEOPOROSIS WITHOUT CURRENT PATHOLOGICAL FRACTURE, UNSPECIFIED OSTEOPOROSIS TYPE: ICD-10-CM

## 2019-06-28 DIAGNOSIS — R26.81 UNSTEADY GAIT: ICD-10-CM

## 2019-06-28 DIAGNOSIS — I48.91 ATRIAL FIBRILLATION, UNSPECIFIED TYPE (HCC): Chronic | ICD-10-CM

## 2019-06-28 PROCEDURE — 1123F ACP DISCUSS/DSCN MKR DOCD: CPT | Performed by: FAMILY MEDICINE

## 2019-06-28 PROCEDURE — G0438 PPPS, INITIAL VISIT: HCPCS | Performed by: FAMILY MEDICINE

## 2019-06-28 PROCEDURE — 4040F PNEUMOC VAC/ADMIN/RCVD: CPT | Performed by: FAMILY MEDICINE

## 2019-06-28 RX ORDER — ACETAMINOPHEN 160 MG
1 TABLET,DISINTEGRATING ORAL DAILY
Qty: 90 CAPSULE | Refills: 3 | Status: SHIPPED | OUTPATIENT
Start: 2019-06-28 | End: 2019-07-24 | Stop reason: DRUGHIGH

## 2019-06-28 ASSESSMENT — PATIENT HEALTH QUESTIONNAIRE - PHQ9
SUM OF ALL RESPONSES TO PHQ QUESTIONS 1-9: 0
SUM OF ALL RESPONSES TO PHQ QUESTIONS 1-9: 0

## 2019-06-28 ASSESSMENT — LIFESTYLE VARIABLES: HOW OFTEN DO YOU HAVE A DRINK CONTAINING ALCOHOL: 0

## 2019-06-28 ASSESSMENT — ANXIETY QUESTIONNAIRES: GAD7 TOTAL SCORE: 0

## 2019-06-28 NOTE — PATIENT INSTRUCTIONS
Personalized Preventive Plan for Joe Marie - 6/28/2019  Medicare offers a range of preventive health benefits. Some of the tests and screenings are paid in full while other may be subject to a deductible, co-insurance, and/or copay. Some of these benefits include a comprehensive review of your medical history including lifestyle, illnesses that may run in your family, and various assessments and screenings as appropriate. After reviewing your medical record and screening and assessments performed today your provider may have ordered immunizations, labs, imaging, and/or referrals for you. A list of these orders (if applicable) as well as your Preventive Care list are included within your After Visit Summary for your review. Other Preventive Recommendations:    · A preventive eye exam performed by an eye specialist is recommended every 1-2 years to screen for glaucoma; cataracts, macular degeneration, and other eye disorders. · A preventive dental visit is recommended every 6 months. · Try to get at least 150 minutes of exercise per week or 10,000 steps per day on a pedometer . · Order or download the FREE \"Exercise & Physical Activity: Your Everyday Guide\" from [employer]. Call 4-293.724.6687 or search [employer] online. · You need 5240-5162 mg of calcium and 6585-9156 IU of vitamin D per day. It is possible to meet your calcium requirement with diet alone, but a vitamin D supplement is usually necessary to meet this goal.  · When exposed to the sun, use a sunscreen that protects against both UVA and UVB radiation with an SPF of 30 or greater. Reapply every 2 to 3 hours or after sweating, drying off with a towel, or swimming. · Always wear a seat belt when traveling in a car. Always wear a helmet when riding a bicycle or motorcycle. Heart-Healthy Diet   Sodium, Fat, and Cholesterol Controlled Diet       What Is a Heart Healthy Diet?    A heart-healthy diet is one that limits sodium , certain types of fat , and cholesterol . This type of diet is recommended for:   People with any form of cardiovascular disease (eg, coronary heart disease , peripheral vascular disease , previous heart attack , previous stroke )   People with risk factors for cardiovascular disease, such as high blood pressure , high cholesterol , or diabetes   Anyone who wants to lower their risk of developing cardiovascular disease   Sodium    Sodium is a mineral found in many foods. In general, most people consume much more sodium than they need. Diets high in sodium can increase blood pressure and lead to edema (water retention). On a heart-healthy diet, you should consume no more than 2,300 mg (milligrams) of sodium per dayabout the amount in one teaspoon of table salt. The foods highest in sodium include table salt (about 50% sodium), processed foods, convenience foods, and preserved foods. Cholesterol    Cholesterol is a fat-like, waxy substance in your blood. Our bodies make some cholesterol. It is also found in animal products, with the highest amounts in fatty meat, egg yolks, whole milk, cheese, shellfish, and organ meats. On a heart-healthy diet, you should limit your cholesterol intake to less than 200 mg per day. It is normal and important to have some cholesterol in your bloodstream. But too much cholesterol can cause plaque to build up within your arteries, which can eventually lead to a heart attack or stroke. The two types of cholesterol that are most commonly referred to are:   Low-density lipoprotein (LDL) cholesterol  Also known as bad cholesterol, this is the cholesterol that tends to build up along your arteries. Bad cholesterol levels are increased by eating fats that are saturated or hydrogenated. Optimal level of this cholesterol is less than 100. Over 130 starts to get risky for heart disease.    High-density lipoprotein (HDL) cholesterol  Also known as good cholesterol, this type of cholesterol actually carries cholesterol away from your arteries and may, therefore, help lower your risk of having a heart attack. You want this level to be high (ideally greater than 60). It is a risk to have a level less than 40. You can raise this good cholesterol by eating olive oil, canola oil, avocados, or nuts. Exercise raises this level, too. Fat    Fat is calorie dense and packs a lot of calories into a small amount of food. Even though fats should be limited due to their high calorie content, not all fats are bad. In fact, some fats are quite healthful. Fat can be broken down into four main types. The good-for-you fats are:   Monounsaturated fat  found in oils such as olive and canola, avocados, and nuts and natural nut butters; can decrease cholesterol levels, while keeping levels of HDL cholesterol high   Polyunsaturated fat  found in oils such as safflower, sunflower, soybean, corn, and sesame; can decrease total cholesterol and LDL cholesterol   Omega-3 fatty acids  particularly those found in fatty fish (such as salmon, trout, tuna, mackerel, herring, and sardines); can decrease risk of arrhythmias, decrease triglyceride levels, and slightly lower blood pressure   The fats that you want to limit are:   Saturated fat  found in animal products, many fast foods, and a few vegetables; increases total blood cholesterol, including LDL levels   Animal fats that are saturated include: butter, lard, whole-milk dairy products, meat fat, and poultry skin   Vegetable fats that are saturated include: hydrogenated shortening, palm oil, coconut oil, cocoa butter   Hydrogenated or trans fat  found in margarine and vegetable shortening, most shelf stable snack foods, and fried foods; increases LDL and decreases HDL     It is generally recommended that you limit your total fat for the day to less than 30% of your total calories.  If you follow an 1800-calorie heart healthy diet, for example, this would mean 60 grams of fat or less per day. Saturated fat and trans fat in your diet raises your blood cholesterol the most, much more than dietary cholesterol does. For this reason, on a heart-healthy diet, less than 7% of your calories should come from saturated fat and ideally 0% from trans fat. On an 1800-calorie diet, this translates into less than 14 grams of saturated fat per day, leaving 46 grams of fat to come from mono- and polyunsaturated fats.    Food Choices on a Heart Healthy Diet   Food Category   Foods Recommended   Foods to Avoid   Grains   Breads and rolls without salted tops Most dry and cooked cereals Unsalted crackers and breadsticks Low-sodium or homemade breadcrumbs or stuffing All rice and pastas   Breads, rolls, and crackers with salted tops High-fat baked goods (eg, muffins, donuts, pastries) Quick breads, self-rising flour, and biscuit mixes Regular bread crumbs Instant hot cereals Commercially prepared rice, pasta, or stuffing mixes   Vegetables   Most fresh, frozen, and low-sodium canned vegetables Low-sodium and salt-free vegetable juices Canned vegetables if unsalted or rinsed   Regular canned vegetables and juices, including sauerkraut and pickled vegetables Frozen vegetables with sauces Commercially prepared potato and vegetable mixes   Fruits   Most fresh, frozen, and canned fruits All fruit juices   Fruits processed with salt or sodium   Milk   Nonfat or low-fat (1%) milk Nonfat or low-fat yogurt Cottage cheese, low-fat ricotta, cheeses labeled as low-fat and low-sodium   Whole milk Reduced-fat (2%) milk Malted and chocolate milk Full fat yogurt Most cheeses (unless low-fat and low salt) Buttermilk (no more than 1 cup per week)   Meats and Beans   Lean cuts of fresh or frozen beef, veal, lamb, or pork (look for the word loin) Fresh or frozen poultry without the skin Fresh or frozen fish and some shellfish Egg whites and egg substitutes (Limit whole eggs to three per and cholesterol amounts. For products low in sodium, look for sodium free, very low sodium, low sodium, no added salt, and unsalted   Skip the salt when cooking or at the table; if food needs more flavor, get creative and try out different herbs and spices. Garlic and onion also add substantial flavor to foods. Trim any visible fat off meat and poultry before cooking, and drain the fat off after salcido. Use cooking methods that require little or no added fat, such as grilling, boiling, baking, poaching, broiling, roasting, steaming, stir-frying, and sauting. Avoid fast food and convenience food. They tend to be high in saturated and trans fat and have a lot of added salt. Talk to a registered dietitian for individualized diet advice. Last Reviewed: March 2011 Freya Pace MS, MPH, RD   Updated: 3/29/2011   ·     Heart-Healthy Diet   Sodium, Fat, and Cholesterol Controlled Diet       What Is a Heart Healthy Diet? A heart-healthy diet is one that limits sodium , certain types of fat , and cholesterol . This type of diet is recommended for:   People with any form of cardiovascular disease (eg, coronary heart disease , peripheral vascular disease , previous heart attack , previous stroke )   People with risk factors for cardiovascular disease, such as high blood pressure , high cholesterol , or diabetes   Anyone who wants to lower their risk of developing cardiovascular disease   Sodium    Sodium is a mineral found in many foods. In general, most people consume much more sodium than they need. Diets high in sodium can increase blood pressure and lead to edema (water retention). On a heart-healthy diet, you should consume no more than 2,300 mg (milligrams) of sodium per dayabout the amount in one teaspoon of table salt. The foods highest in sodium include table salt (about 50% sodium), processed foods, convenience foods, and preserved foods.    Cholesterol    Cholesterol is a fat-like, waxy substance in your blood. Our bodies make some cholesterol. It is also found in animal products, with the highest amounts in fatty meat, egg yolks, whole milk, cheese, shellfish, and organ meats. On a heart-healthy diet, you should limit your cholesterol intake to less than 200 mg per day. It is normal and important to have some cholesterol in your bloodstream. But too much cholesterol can cause plaque to build up within your arteries, which can eventually lead to a heart attack or stroke. The two types of cholesterol that are most commonly referred to are:   Low-density lipoprotein (LDL) cholesterol  Also known as bad cholesterol, this is the cholesterol that tends to build up along your arteries. Bad cholesterol levels are increased by eating fats that are saturated or hydrogenated. Optimal level of this cholesterol is less than 100. Over 130 starts to get risky for heart disease. High-density lipoprotein (HDL) cholesterol  Also known as good cholesterol, this type of cholesterol actually carries cholesterol away from your arteries and may, therefore, help lower your risk of having a heart attack. You want this level to be high (ideally greater than 60). It is a risk to have a level less than 40. You can raise this good cholesterol by eating olive oil, canola oil, avocados, or nuts. Exercise raises this level, too. Fat    Fat is calorie dense and packs a lot of calories into a small amount of food. Even though fats should be limited due to their high calorie content, not all fats are bad. In fact, some fats are quite healthful. Fat can be broken down into four main types.    The good-for-you fats are:   Monounsaturated fat  found in oils such as olive and canola, avocados, and nuts and natural nut butters; can decrease cholesterol levels, while keeping levels of HDL cholesterol high   Polyunsaturated fat  found in oils such as safflower, sunflower, soybean, corn, and sesame; can decrease total cholesterol and LDL cholesterol   Omega-3 fatty acids  particularly those found in fatty fish (such as salmon, trout, tuna, mackerel, herring, and sardines); can decrease risk of arrhythmias, decrease triglyceride levels, and slightly lower blood pressure   The fats that you want to limit are:   Saturated fat  found in animal products, many fast foods, and a few vegetables; increases total blood cholesterol, including LDL levels   Animal fats that are saturated include: butter, lard, whole-milk dairy products, meat fat, and poultry skin   Vegetable fats that are saturated include: hydrogenated shortening, palm oil, coconut oil, cocoa butter   Hydrogenated or trans fat  found in margarine and vegetable shortening, most shelf stable snack foods, and fried foods; increases LDL and decreases HDL     It is generally recommended that you limit your total fat for the day to less than 30% of your total calories. If you follow an 1800-calorie heart healthy diet, for example, this would mean 60 grams of fat or less per day. Saturated fat and trans fat in your diet raises your blood cholesterol the most, much more than dietary cholesterol does. For this reason, on a heart-healthy diet, less than 7% of your calories should come from saturated fat and ideally 0% from trans fat. On an 1800-calorie diet, this translates into less than 14 grams of saturated fat per day, leaving 46 grams of fat to come from mono- and polyunsaturated fats.    Food Choices on a Heart Healthy Diet   Food Category   Foods Recommended   Foods to Avoid   Grains   Breads and rolls without salted tops Most dry and cooked cereals Unsalted crackers and breadsticks Low-sodium or homemade breadcrumbs or stuffing All rice and pastas   Breads, rolls, and crackers with salted tops High-fat baked goods (eg, muffins, donuts, pastries) Quick breads, self-rising flour, and biscuit mixes Regular bread crumbs Instant hot cereals Commercially prepared rice, pasta, or stuffing mixes Vegetables   Most fresh, frozen, and low-sodium canned vegetables Low-sodium and salt-free vegetable juices Canned vegetables if unsalted or rinsed   Regular canned vegetables and juices, including sauerkraut and pickled vegetables Frozen vegetables with sauces Commercially prepared potato and vegetable mixes   Fruits   Most fresh, frozen, and canned fruits All fruit juices   Fruits processed with salt or sodium   Milk   Nonfat or low-fat (1%) milk Nonfat or low-fat yogurt Cottage cheese, low-fat ricotta, cheeses labeled as low-fat and low-sodium   Whole milk Reduced-fat (2%) milk Malted and chocolate milk Full fat yogurt Most cheeses (unless low-fat and low salt) Buttermilk (no more than 1 cup per week)   Meats and Beans   Lean cuts of fresh or frozen beef, veal, lamb, or pork (look for the word loin) Fresh or frozen poultry without the skin Fresh or frozen fish and some shellfish Egg whites and egg substitutes (Limit whole eggs to three per week) Tofu Nuts or seeds (unsalted, dry-roasted), low-sodium peanut butter Dried peas, beans, and lentils   Any smoked, cured, salted, or canned meat, fish, or poultry (including lerner, chipped beef, cold cuts, hot dogs, sausages, sardines, and anchovies) Poultry skins Breaded and/or fried fish or meats Canned peas, beans, and lentils Salted nuts   Fats and Oils   Olive oil and canola oil Low-sodium, low-fat salad dressings and mayonnaise   Butter, margarine, coconut and palm oils, lerner fat   Snacks, Sweets, and Condiments   Low-sodium or unsalted versions of broths, soups, soy sauce, and condiments Pepper, herbs, and spices; vinegar, lemon, or lime juice Low-fat frozen desserts (yogurt, sherbet, fruit bars) Sugar, cocoa powder, honey, syrup, jam, and preserves Low-fat, trans-fat free cookies, cakes, and pies Tono and animal crackers, fig bars, kendal snaps   High-fat desserts Broth, soups, gravies, and sauces, made from instant mixes or other high-sodium ingredients Salted snack foods Canned olives Meat tenderizers, seasoning salt, and most flavored vinegars   Beverages   Low-sodium carbonated beverages Tea and coffee in moderation Soy milk   Commercially softened water   Suggestions   Make whole grains, fruits, and vegetables the base of your diet. Choose heart-healthy fats such as canola, olive, and flaxseed oil, and foods high in heart-healthy fats, such as nuts, seeds, soybeans, tofu, and fish. Eat fish at least twice per week; the fish highest in omega-3 fatty acids and lowest in mercury include salmon, herring, mackerel, sardines, and canned chunk light tuna. If you eat fish less than twice per week or have high triglycerides, talk to your doctor about taking fish oil supplements. Read food labels. For products low in fat and cholesterol, look for fat free, low-fat, cholesterol free, saturated fat free, and trans fat freeAlso scan the Nutrition Facts Label, which lists saturated fat, trans fat, and cholesterol amounts. For products low in sodium, look for sodium free, very low sodium, low sodium, no added salt, and unsalted   Skip the salt when cooking or at the table; if food needs more flavor, get creative and try out different herbs and spices. Garlic and onion also add substantial flavor to foods. Trim any visible fat off meat and poultry before cooking, and drain the fat off after salcido. Use cooking methods that require little or no added fat, such as grilling, boiling, baking, poaching, broiling, roasting, steaming, stir-frying, and sauting. Avoid fast food and convenience food. They tend to be high in saturated and trans fat and have a lot of added salt. Talk to a registered dietitian for individualized diet advice.       Last Reviewed: March 2011 Fer Rm MS, MPH, RD   Updated: 3/29/2011   ·   Patient information: Weight loss treatments    INTRODUCTION -- Obesity is a major international problem, and Americans are among the heaviest staying at your current weight (or within 5 percent). Many people have a \"dream\" weight that is difficult or impossible to achieve. People at high risk of developing diabetes who are able to lose 5 percent of their body weight and maintain this weight will reduce their risk of developing diabetes by about 50 percent and reduce their blood pressure. This is a success. Losing more than 15 percent of your body weight and staying at this weight is an extremely good result, even if you never reach your \"dream\" or \"ideal\" weight. LIFESTYLE CHANGES -- Programs that help you to change your lifestyle are usually run by psychologists or other professionals. The goals of lifestyle changes are to help you change your eating habits, become more active, and be more aware of how much you eat and exercise, helping you to make healthier choices. This type of treatment can be broken down into three steps: The triggers that make you want to eat   Eating   What happens after you eat  Triggers to eat -- Determining what triggers you to eat involves figuring out what foods you eat and where and when you eat. To figure out what triggers you to eat, keep a record for a few days of everything you eat, the places where you eat, how often you eat, and the emotions you were feeling when you ate. For some people, the trigger is related to a certain time of day or night. For others, the trigger is related to a certain place, like sitting at a desk working. Eating -- You can change your eating habits by breaking the chain of events between the trigger for eating and eating itself. There are many ways to do this.  For instance, you can:  Limit where you eat to a few places (eg, dining room)   Restrict the number of utensils (eg, only a fork) used for eating   Drink a sip of water between each bite   Chew your food a certain number of times   Get up and stop eating every few minutes  What happens after you eat -- Rewarding yourself for good eating behaviors can help you to develop better habits. This is not a reward for weight loss; instead, it is a reward for changing unhealthy behaviors. Do not use food as a reward. Some people find money, clothing, or personal care (eg, a hair cut, manicure, or massage) to be effective rewards. Treat yourself immediately after making better eating choices to reinforce the value of the good behavior. You need to have clear behavior goals, and you must have a time frame for reaching your goals. Reward small changes along the way to your final goal.  Other factors that contribute to successful weight loss -- Changing your behavior involves more than just changing unhealthy eating habits; it also involves finding people around you to support your weight loss, reducing stress, and learning to be strong when tempted by food. Establish a \"kain\" system -- Having a friend or family member available to provide support and reinforce good behavior is very helpful. The support person needs to understand your goals. Learn to be strong -- Learning to be strong when tempted by food is an important part of losing weight. As an example, you will need to learn how to say \"no\" and continue to say no when urged to eat at parties and social gatherings. Develop strategies for events before you go, such as eating before you go or taking low-calorie snacks and drinks with you. Develop a support system -- Having a support system is helpful when losing weight. This is why many [employer] groups are successful. Family support is also essential; if your family does not support your efforts to lose weight, this can slow your progress or even keep you from losing weight. Positive thinking -- People often have conversations with themselves in their head; these conversations can be positive or negative. If you eat a piece of cake that was not planned, you may respond by thinking, \"Oh, you stupid idiot, you've blown your diet! \" and as a result, you may eat more cake. A positive thought for the same event could be, \"Well, I ate cake when it was not on my plan. Now I should do something to get back on track. \" A positive approach is much more likely to be successful than a negative one. Reduce stress -- Although stress is a part of everyday life, it can trigger uncontrolled eating in some people. It is important to find a way to get through these difficult times without eating or by eating low-calorie food, like raw vegetables. It may be helpful to imagine a relaxing place that allows you to temporarily escape from stress. With deep breaths and closed eyes, you can imagine this relaxing place for a few minutes. Self-help programs -- Self-help programs like [place] Watchers®, Overeaters Anonymous®, and Take Off [place] (XTWIP)© work for some people. As with all weight loss programs, you are most likely to be successful with these plans if you make long-term changes in how you eat. CHOOSING A DIET -- A calorie is a unit of energy found in food. Your body needs calories to function. The goal of any diet is to burn up more calories than you eat. How quickly you lose weight depends upon several factors, such as your age, gender, and starting weight. Older people have a slower metabolism than young people, so they lose weight more slowly. Men lose more weight than women of similar height and weight when dieting because they use more energy. People who are extremely overweight lose weight more quickly than those who are only mildly overweight. Try not to drink alcohol or drinks with added sugar, and most sweets (candy, cakes, cookies), since they rarely contain important nutrients. Portion-controlled diets -- One simple way to diet is to buy packaged foods, like frozen low-calorie meals or meal-replacement canned drinks.  A typical meal plan for one day may include:  A meal-replacement drink or breakfast bar for breakfast   A meal-replacement drink or a frozen low-calorie (250 to 350 calories) meal for lunch   A frozen low-calorie meal or other prepackaged, calorie-controlled meal, along with extra vegetables for dinner  This would give you 1000 to 1500 calories per day. Low-fat diet -- To reduce the amount of fat in your diet, you can:  Eat low-fat foods. Low-fat foods are those that contain less than 30 percent of calories from fat. Fat is listed on the food facts label (figure 1). Count fat grams. For a 1500 calorie diet, this would mean about 45 g or fewer of fat per day. Low-carbohydrate diet -- Low- and very-low-carbohydrate diets (eg, Atkins diet, [employer]) have become popular ways to lose weight quickly. With a very-low-carbohydrate diet, you eat between 0 and 60 grams of carbohydrates per day (a standard diet contains 200 to 300 grams of carbohydrates)   With a low-carbohydrate diet, you eat between 60 and 130 grams of carbohydrates per day  Carbohydrates are found in fruits, vegetables, and grains (including breads, rice, pasta, and cereal), alcoholic beverages, and in dairy products. Meat and fish do not contain carbohydrates. Side effects of very-low-carbohydrate diets can include constipation, headache, bad breath, muscle cramps, diarrhea, and weakness. Mediterranean diet -- The term \"Mediterranean diet\" refers to a way of eating that is common in [place] regions around the [facility]. Although there is some variation in Mediterranean diets, there are some similarities. Most Mediterranean diets include:  A high level of monounsaturated fats (from olive or canola oil, walnuts, pecans, almonds) and a low level of saturated fats (from butter)   A high amount of vegetables, fruits, legumes, and grains (7 to 10 servings of fruits and vegetables per day)   A moderate amount of milk and dairy products, mostly in the form of cheese. Use low-fat dairy products (skim milk, fat-free yogurt, low-fat cheese). A relatively low amount of red meat and meat products. Substitute fish or poultry for red meat. For those who drink alcohol, a modest amount (mainly as red wine) may help to protect against cardiovascular disease. A modest amount is up to one (4 ounce) glass per day for women and up to two glasses per day for men. Which diet is best? -- Studies have compared different diets, including:  Very-low-carbohydrate (Atkins)   Macronutrient balance controlling glycemic load (Zone®)   Reduced-calorie (Weight Watchers®)   Very-low-fat (Ornish)  No one diet is \"best\" for weight loss. Any diet will help you to lose weight if you stick with the diet. Therefore, it is important to choose a diet that includes foods you like. Fad diets -- Fad diets often promise quick weight loss (more than 1 to 2 pounds per week) and may claim that you do not need to exercise or give up favorite foods. Some fad diets cost a lot of money, because you have to pay for seminars or pills. Fad diets generally lack any scientific evidence that they are safe and effective, but instead rely on \"before\" and \"after\" photos or testimonials. Diets that sound too good to be true usually are. These plans are a waste of time and money and are not recommended. A doctor, nurse, or nutritionist can help you find a safe and effective way to lose weight and keep it off. WEIGHT LOSS MEDICINES -- Taking a weight loss medicine may be helpful when used in combination with diet, exercise, and lifestyle changes. However, it is important to understand the risks and benefits of these medicines. It is also important to be realistic about your goal weight using a weight loss medicine; you may not reach your \"dream\" weight, but you may be able to reduce your risk of diabetes or heart disease.    Weight loss medicines may be recommended for people who have not been able to lose weight with diet and exercise who have a:  BMI of 30 or more    BMI between 27 and 29.9 and have other medical problems, such as diabetes, high cholesterol, or high blood pressure  Two weight loss medicines are approved in the [place] for long-term use. These are sibutramine and orlistat. Other weight loss medicines (phentermine, diethylpropion) are available but are only approved for short-term use (up to 12 weeks). Sibutramine -- Sibutramine (Meridia®, Reductil®) is a medicine that reduces your appetite. In people who take the medicine for one year, the average weight loss is 10 percent of the initial body weight (5 percent more than those who took a placebo treatment). Side effects of sibutramine include insomnia, dry mouth, and constipation. Increases in blood pressure can occur. Therefore, blood pressure is usually monitored during treatment. There is no evidence that sibutramine causes heart or lung problems (like dexfenfluramine and fenfluramine (Phen/Fen)). However, experts agree that sibutramine should not used by people with coronary heart disease, heart failure, uncontrolled hypertension, stroke, irregular heart rhythms, or peripheral vascular disease (poor circulation in the legs). Orlistat -- Orlistat (Xenical® 120 mg capsules) is a medicine that reduces the amount of fat your body absorbs from the foods you eat. A lower-dose version is now available without a prescription (Mayito® 60 mg capsules) in many countries, including the [place]. The medicine is recommended three times per day, taken with a meal; you can skip a dose if you skip a meal or if the meal contains no fat. After one year of treatment with orlistat, the average weight loss is approximately 8 to 10 percent of initial body weight (4 percent more than in those who took a placebo). Cholesterol levels often improve, and blood pressure sometimes falls. In people with diabetes, orlistat may help control blood sugar levels.   Side effects occur in 15 to 10 percent of people and may include stomach cramps, gas, diarrhea, leakage of stool, or oily stools. These problems are more likely when you take orlistat with a high-fat meal (if more than 30 percent of calories in the meal are from fat). Side effects usually improve as you learn to avoid high-fat foods. Severe liver injury has been reported rarely in patients taking orlistat, but it is not known if orlistat caused the liver problems. Diet supplements -- Diet supplements are widely used by people who are trying to lose weight, although the safety and efficacy of these supplements are often unproven. A few of the more common diet supplements are discussed below; none of these are recommended because they have not been studied carefully, and there is no proof they are safe or effective. Chitosan and wheat dextrin are ineffective for weight loss, and their use is not recommended. Ephedra, a compound related to ephedrine, is no longer available in the [place] due to safety concerns. Many nonprescription diet pills previously contained ephedra. Although some studies have shown that ephedra helps with weight loss, there can be serious side effects (psychiatric symptoms, palpitations, and stomach upset), including death. There are not enough data about the safety and efficacy of chromium, ginseng, glucomannan, green tea, hydroxycitric acid, L carnitine, psyllium, pyruvate supplements, Hosmer wort, and conjugated linoleic acid. Two supplements from [facility], [address] [place] (also known as the [place] [address] pill) and Herbathin dietary supplement, have been shown to contain prescription drugs. Hoodia gordonii is a dietary supplement derived from a plant in [place]. It is not recommended because there is no proof that it is safe or effective. Bitter orange (Citrus aurantium) can increase your heart rate and blood pressure and is not recommended.   SHOULD I HAVE SURGERY TO LOSE WEIGHT? -- Weight loss surgery is recommended ONLY for people with one of the banding (LAGB), or lap banding, is a surgery that uses an adjustable band around the opening to the stomach (figure 1). This reduces the amount of food that you can eat at one time. Lap banding is done through small incisions, with a laparoscope. The band can be adjusted after surgery, allowing you to eat more or less food. Adjustments to the size and tightness of the band are made by using a needle to add or remove fluid from a port (a small container under the skin that is connected to the band). Adding fluid to the band makes it tighter which restricts the amount of food you can eat and may help you to lose more weight. Lap banding is a popular choice because it is relatively simple to perform, can be adjusted or removed, and has a low risk of serious complications immediately after surgery. However, weight loss with the lap band depends on your ability to follow the program closely. You will need to prepare nutritious meals that [facility] with\" the band, not against it. For example, the lap band will not work well if you eat or drink a large amount of liquid calories (like ice cream). The band will not help you to feel full when you eat/drink liquid calories. Weight loss ranges from 45 to 75 percent after two years. As an example, a person who is 120 pounds overweight could expect to lose approximately 54 to 90 pounds in the two years after lap banding. Gastric bypass -- Rachel-en-Y gastric bypass, also called gastric bypass, helps you to lose weight by reducing the amount of food you can eat and reducing the number of calories and nutrients you absorb from the food you eat. To perform gastric bypass, a surgeon creates a small stomach pouch by dividing the stomach and attaching it to the small intestine. This helps you to lose weight in two ways: The smaller stomach can hold less food than before surgery. This causes you to feel full after eating a very small amount of food or liquid.  Over time, the pouch might stretch, allowing you to eat more food. The body absorbs fewer calories, since food bypasses most of the stomach as well as the upper small intestine. This new arrangement seems to decrease your appetite and change how you break down foods by changing the release of various hormones. Gastric bypass can be performed as open surgery (through an incision on the abdomen) or laparoscopically, which uses smaller incisions and smaller instruments. Both the laparoscopic and open techniques have risks and benefits. You and your surgeon should work together to decide which surgery, if any, is right for you. Gastric bypass has a high success rate, and people lose an average of 62 to 68 percent of their excess body weight in the first year. Weight loss typically levels off after one to two years, with an overall excess weight loss between 50 and 75 percent. For a person who is 120 pounds overweight, an average of 60 to 90 pounds of weight loss would be expected. Gastric sleeve -- Gastric sleeve, also known as sleeve gastrectomy, is a surgery that reduces the size of the stomach and makes it into a narrow tube (figure 3). The new stomach is much smaller and produces less of the hormone (ghrelin) that causes hunger, helping you feel satisfied with less food. Sleeve gastrectomy is safer than gastric bypass because the intestines are not rearranged, and there is less chance of malnutrition. It also appears to control hunger better than lap banding. It might be safer than the lap banding because no foreign materials are used. The gastric sleeve has a good success rate, and people lose an average of 33 percent of their excess body weight in the first year. For a person who is 120 pounds overweight, this would mean losing about 40 pounds in the first year. WEIGHT LOSS SURGERY COMPLICATIONS -- A variety of complications can occur with weight loss surgery.  The risks of surgery depend upon which surgery you have and any medical your doctor, nurse, and dietitian on a regular basis after surgery to monitor your health, diet, and weight loss. You will be able to slowly increase how much you eat over time, although it will always be important to:  Eat small, frequent meals and not skip meals   Chew your food slowly and completely   Avoid eating while \"distracted\" (such as eating while watching TV)   Stop eating when you feel full   Drink liquids at least 30 minutes before or after eating   Avoid foods high in fat or sugar   Take vitamin supplements, as recommended  It can take several months to learn to listen to your body so that you know when you are hungry and when you are full. You may dislike foods you previously loved, and you may begin to prefer new foods. This can be a frustrating process for some people, so talk to your dietitian if you are having trouble. It usually takes between one and two years to lose weight after surgery. After reaching their goal weight, some people have plastic surgery (called \"body contouring\") to remove excess skin from the body, particularly in the abdominal area. Before you decide to have weight loss surgery, you must commit to staying healthy for life. This includes following up with your healthcare team, exercising most days of the week, and eating a sensible diet every day. It can be difficult to develop new eating and exercise habits after weight loss surgery, and you will have to work hard to stick to your goals. Recovering from surgery and losing weight can be stressful and emotional, and it is important to have the support of family and friends. Working with a , therapist, or support group can help you through the ups and downs. WHERE TO GET MORE INFORMATION -- Your healthcare provider is the best source of information for questions and concerns related to your medical problem.   This article will be updated as needed every four months on our Web site idea of what usually happens with your specific illness. Think about preparing papers that state your wishes. This way there will not be any confusion about what you want. You can change your instructions at any time. Which papers should you prepare? Advance directives are legal papers that tell doctors how you want to be cared for at the end of your life. You do not need a  to write these papers. Ask your doctor or your state health department for information on how to write your advance directives. They may have the forms for each of these types of papers. Make sure your doctor has a copy of these on file, and give a copy to a family member or close friend. Consider a do-not-resuscitate order (DNR). This order asks that no extra treatments be done if your heart stops or you stop breathing. Extra treatments may include cardiopulmonary resuscitation (CPR), electrical shock to restart your heart, or a machine to breathe for you. If you decide to have a DNR order, ask your doctor to explain and write it. Place the order in your home where everyone can easily see it. Consider a living will. A living will explains your wishes about life support and other treatments at the end of your life if you become unable to speak for yourself. Living krishnan tell doctors to use or not use treatments that would keep you alive. You must have one or two witnesses or a notary present when you sign this form. Consider a durable power of  for health care. This allows you to name a person to make decisions about your care if you are not able to. Most people ask a close friend or family member. Talk to this person about the kinds of treatments you want and those that you do not want. Make sure this person understands your wishes. These legal papers are simple to change. Tell your doctor what you want to change, and ask him or her to make a note in your medical file. Give your family updated copies of the papers.   Where can you learn more? Go to [email or web address] and sign in to your [employer] account. Enter P184 in the [employer] box to learn more about \"Advance Care Planning: Care Instructions. \"     If you do not have an account, please click on the \"Sign Up Now\" link. Current as of: April 18, 2018  Content Version: 12.0  © 4654-8027 [employer]. Care instructions adapted under license by [facility] ([facility]). If you have questions about a medical condition or this instruction, always ask your healthcare professional. [address] any warranty or liability for your use of this information. ·        Learning About Living Perroy  What is a living will? A living will is a legal form you use to write down the kind of care you want at the end of your life. It is used by the health professionals who will treat you if you aren't able to decide for yourself. If you put your wishes in writing, your loved ones and others will know what kind of care you want. They won't need to guess. This can ease your mind and be helpful to others. A living will is not the same as an estate or property will. An estate will explains what you want to happen with your money and property after you die. Is a living will a legal document? A living will is a legal document. Each state has its own laws about living krishnan. If you move to another state, make sure that your living will is legal in the state where you now live. Or you might use a universal form that has been approved by many states. This kind of form can sometimes be completed and stored online. Your electronic copy will then be available wherever you have a connection to the Internet. In most cases, doctors will respect your wishes even if you have a form from a different state. You don't need an  to complete a living will.  But legal advice can be helpful if your state's laws are unclear, your health history is complicated, or your family can't agree on what should be in your living will. You can change your living will at any time. Some people find that their wishes about end-of-life care change as their health changes. In addition to making a living will, think about completing a medical power of  form. This form lets you name the person you want to make end-of-life treatment decisions for you (your \"health care agent\") if you're not able to. Many hospitals and nursing homes will give you the forms you need to complete a living will and a medical power of . Your living will is used only if you can't make or communicate decisions for yourself anymore. If you become able to make decisions again, you can accept or refuse any treatment, no matter what you wrote in your living will. Your state may offer an online registry. This is a place where you can store your living will online so the doctors and nurses who need to treat you can find it right away. What should you think about when creating a living will? Talk about your end-of-life wishes with your family members and your doctor. Let them know what you want. That way the people making decisions for you won't be surprised by your choices. Think about these questions as you make your living will:  Do you know enough about life support methods that might be used? If not, talk to your doctor so you know what might be done if you can't breathe on your own, your heart stops, or you're unable to swallow. What things would you still want to be able to do after you receive life-support methods? Would you want to be able to walk? To speak? To eat on your own? To live without the help of machines? If you have a choice, where do you want to be cared for? In your home? At a hospital or nursing home? Do you want certain Pent[employer]ostal practices performed if you become very ill? If you have a choice at the end of your life, where would you prefer to die? At home? In a hospital or nursing home? Somewhere else? Would you prefer to be buried or cremated? Do you want your organs to be donated after you die? What should you do with your living will? Make sure that your family members and your health care agent have copies of your living will. Give your doctor a copy of your living will to keep in your medical record. If you have more than one doctor, make sure that each one has a copy. You may want to put a copy of your living will where it can be easily found. Where can you learn more? Go to [email or web address] and sign in to your [employer] InSite account. Enter H766 in the [employer] box to learn more about \"Learning About Living Perroy. \"     If you do not have an account, please click on the \"Sign Up Now\" link. Current as of: April 18, 2018  Content Version: 12.0  © 6299-4098 [employer]. Care instructions adapted under license by [facility] ([facility]). If you have questions about a medical condition or this instruction, always ask your healthcare professional. [place] [address] any warranty or liability for your use of this information. ·        Learning About Durable Power of  for Health Care  What is a durable power of  for health care? A durable power of  for health care is one type of the legal forms called advance directives. It lets you decide who you want to make treatment decisions for you if you cannot speak or decide for yourself. The person you choose is called your health care agent. Another type of advance directive is a living will. It lets you write down what kinds of treatment or life support you want or do not want. What should you think about when choosing a health care agent? Choose your health care agent carefully. This person may or may not be a family member. Talk to the person before you make your final decision.  Make sure he or she is comfortable with this responsibility. It's a good idea to choose someone who:  Is at least 25years old. Knows you well and understands what makes life meaningful for you. Understands your Pentecostalism and moral values. Will do what you want, not what he or she wants. Will be able to make difficult choices at a stressful time. Will be able to refuse or stop treatment, if that is what you would want, even if you could die. Will be firm and confident with health professionals if needed. Will ask questions to get necessary information. Lives near you or agrees to travel to you if needed. Your family may help you make medical decisions while you can still be part of that process. But it is important to choose one person to be your health care agent in case you are not able to make decisions for yourself. If you don't fill out the legal form and name a health care agent, the decisions your family can make may be limited. Who will make decisions for you if you do not have a health care agent? If you don't have a health care agent or a living will, your family members may disagree about your medical care. And then some medical professionals who may not know you as well might have to make decisions for you. In some cases, a  makes the decisions. When you name a health care agent, it is very clear who has the power to make health decisions for you. How do you name a health care agent? You name your health care agent on a legal form. It is usually called a durable power of  for health care. Ask your hospital, state bar association, or office on aging where to find these forms. You must sign the form to make it legal. Some states require you to get the form notarized. This means that a person called a  watches you sign the form and then he or she signs the form. Some states also require that two or more witnesses sign the form.   Be sure to tell your family members and doctors who your health care agent is.  Keep your forms in a safe place. But make sure that your loved ones know where the forms are. This could be in your desk where you keep other important papers. Make sure your doctor has a copy of your forms. Where can you learn more? Go to [email or web address] and sign in to your [employer] account. Enter [id number] in the [employer] box to learn more about \"Learning About Durable Power of  for Health Care. \"     If you do not have an account, please click on the \"Sign Up Now\" link. Current as of: April 18, 2018  Content Version: 12.0  © 8996-9877 [employer]. Care instructions adapted under license by [facility] ([facility]). If you have questions about a medical condition or this instruction, always ask your healthcare professional. [address] any warranty or liability for your use of this information.     ·

## 2019-06-28 NOTE — PROGRESS NOTES
then 1 tab 2x daily thereafter Yes Historical Provider, MD   TRADJENTA 5 MG tablet Take 1 tablet by mouth daily Yes Che Edwards MD   Handicap Placard MISC by Does not apply route DX: Dementia (F03.90), Type 2 Diabetes (E11.8), Atrial Fibrillation (I48.91)     EXPIRES: 05/2023 Yes Che Edwards MD   warfarin (COUMADIN) 2.5 MG tablet Take 2.5 mg by mouth daily Take daily as directed per physician based on INR results Yes Historical Provider, MD   docusate sodium (STOOL SOFTENER) 100 MG capsule Take 100 mg by mouth daily  Yes Historical Provider, MD   Psyllium ([employer] NATURAL FIBER SUPPLEMENT PO) Take 1 tablet by mouth daily Yes Historical Provider, MD   ferrous sulfate (FE TABS) 325 (65 Fe) MG EC tablet Take 1 tablet by mouth daily (with breakfast) Yes Jewell Tolliver MD   acetaminophen (APAP EXTRA STRENGTH) 500 MG tablet Take 2 tablets by mouth 3 times daily as needed for Pain Yes Jewell Tolliver MD   Blood Glucose Monitoring Suppl TROY Use TID Yes Jewell Tolliver MD   Glucose Blood (BLOOD GLUCOSE TEST STRIPS) STRP Use TID Yes Jewell Tolliver MD   Lancets MISC 1 each by Does not apply route 3 times daily Yes Jewell Tolliver MD   Blood Glucose Monitoring Suppl (FREESTYLE FREEDOM) KIT Test once daily. DX: E11.8 Yes Jewell Tolliver MD   melatonin 3 MG TABS tablet Take 1 tablet by mouth nightly Yes Jewell Tolliver MD   sotalol (BETAPACE) 80 MG tablet Take 1 tablet by mouth 2 times daily  Patient taking differently: Take 80 mg by mouth 2 times daily Take 1 tablet twice daily Yes MERCEDES Garg   Calcium Carbonate-Vitamin D (CALCIUM + D PO) Take 1 tablet by mouth daily Yes Historical Provider, MD   Compression Stockings MISC by Does not apply route.  Knee high Yes MERCEDES Garg   atenolol (TENORMIN) 50 MG tablet Take 50 mg by mouth daily Take 1 tablet daily Yes Historical Provider, MD   atorvastatin (LIPITOR) 10 MG tablet Take 10 mg by mouth daily At bedtime Yes Historical Provider, MD   aspirin 81 MG tablet Take 81 mg by mouth daily. Yes Historical Provider, MD cole    Medication Sig Taking?  Authorizing Provider   Cholecalciferol (VITAMIN D3) 2000 units CAPS Take 1 capsule by mouth daily Yes [place], MD   Tetanus-Diphth-Acell Pertussis (BOOSTRIX) 5-2.5-18.5 LF-MCG/0.5 injection Inject 0.5 mLs into the muscle once for 1 dose Yes [place], MD   zoster recombinant adjuvanted vaccine (SHINGRIX) 50 MCG/0.5ML SUSR injection Inject 0.5 mLs into the muscle once for 1 dose - dose #2 indicated 2-6 months after dose #1 Yes [place], MD   amLODIPine (NORVASC) 5 MG tablet Take 1 tablet by mouth daily Yes [place], MD   omeprazole (PRILOSEC) 20 MG delayed release capsule TAKE 1 CAPSULE BY MOUTH ONCE DAILY Yes [place], MD   enalapril (VASOTEC) 10 MG tablet Take 1 tablet by mouth daily Yes [place], MD   sertraline (ZOLOFT) 50 MG tablet Take 1 tablet by mouth daily Yes [place], MD   glipiZIDE (GLUCOTROL) 10 MG tablet TAKE 1 TABLET BY MOUTH ONCE DAILY Yes [place], MD   donepezil (ARICEPT) 10 MG tablet Take 1 tablet by mouth nightly Yes [place], MD   citalopram (CELEXA) 20 MG tablet Take 1 tablet by mouth daily Yes Gracie Boxer Riedy, APRN - CNP   traZODone (DESYREL) 50 MG tablet Take 1 tablet by mouth nightly Yes [place], MD   memantine (NAMENDA) 10 MG tablet Take 1/2 tablet daily x 7 days,then 1/2 tablet 2x daily x 7 days, then 1/2 tab in AM & 1tab @ dinner x7 days then 1 tab 2x daily thereafter Yes Historical Provider, MD   TRADJENTA 5 MG tablet Take 1 tablet by mouth daily Yes [place], MD   Handicap Placard MISC by Does not apply route DX: Dementia (F03.90), Type 2 Diabetes (E11.8), Atrial Fibrillation (I48.91)     EXPIRES: 05/2023 Yes [place], MD   warfarin (COUMADIN) 2.5 MG tablet Take 2.5 mg by mouth daily Take daily as directed per physician based on INR results Yes Historical Provider, MD   docusate sodium (STOOL SOFTENER) 100 MG capsule Take 100 mg by mouth daily  Yes Historical Provider, MD   Psyllium ([employer] NATURAL FIBER SUPPLEMENT PO) Take 1 tablet by mouth daily Yes Historical Provider, MD   ferrous sulfate (FE TABS) 325 (65 Fe) MG EC tablet Take 1 tablet by mouth daily (with breakfast) Yes Cal Joyner MD   acetaminophen (APAP EXTRA STRENGTH) 500 MG tablet Take 2 tablets by mouth 3 times daily as needed for Pain Yes Cal Joyner MD   Blood Glucose Monitoring Suppl TROY Use TID Yes Cal Joyner MD   Glucose Blood (BLOOD GLUCOSE TEST STRIPS) STRP Use TID Yes Cal Joyner MD   Lancets MISC 1 each by Does not apply route 3 times daily Yes Cal Joyner MD   Blood Glucose Monitoring Suppl (FREESTYLE FREEDOM) KIT Test once daily. DX: E11.8 Yes Cal Joyner MD   melatonin 3 MG TABS tablet Take 1 tablet by mouth nightly Yes Cal Joyner MD   sotalol (BETAPACE) 80 MG tablet Take 1 tablet by mouth 2 times daily  Patient taking differently: Take 80 mg by mouth 2 times daily Take 1 tablet twice daily Yes MERCEDES Sanchez   Calcium Carbonate-Vitamin D (CALCIUM + D PO) Take 1 tablet by mouth daily Yes Historical Provider, MD   Compression Stockings MISC by Does not apply route. Knee high Yes MERCEDES Sanchez   atenolol (TENORMIN) 50 MG tablet Take 50 mg by mouth daily Take 1 tablet daily Yes Historical Provider, MD   atorvastatin (LIPITOR) 10 MG tablet Take 10 mg by mouth daily At bedtime Yes Historical Provider, MD   aspirin 81 MG tablet Take 81 mg by mouth daily.  Yes Historical Provider, MD      Diagnosis Date    Atrial fibrillation ([facility] [address]     CKD (chronic kidney disease) stage 3, GFR 30-59 ml/min ([address] 5/15/2018    DDD (degenerative disc disease), lumbar 5/21/2018    Dementia with behavioral disturbance 12/9/2015    Depression     Diverticular disease 2004    ct pelvis    DJD (degenerative joint disease) of knee     both    GERD (gastroesophageal reflux disease)     GERD (gastroesophageal reflux disease) 5/15/2018    Hernia, hiatal     egd    History of cancer of left breast 4/27/2018    2010 s/p left mastectomy    HTN (hypertension) 6/27/2013    Hyperlipidemia     Insomnia     Malignant neoplasm of female breast ([facility] [address] 2010    left breast s/p mastectomy    Osteopenia 2003    Pancreas cyst 2007    Scoliosis 5/21/2018    Type 2 diabetes mellitus with complication, without long-term current use of insulin ([address] 6/27/2013     Past Surgical History:   Procedure Laterality Date    APPENDECTOMY  1974    BLEPHAROPLASTY Bilateral 2011    cosmetic    CATARACT REMOVAL Left     OU    COLONOSCOPY  2006    ENDOSCOPY, COLON, DIAGNOSTIC      HYSTERECTOMY  1974    KNEE ARTHROSCOPY Left 04/2007    left    LUMBAR LAMINECTOMY  1994    with disc surgery    MASTECTOMY Left 2010    left    OVARY REMOVAL Right 1974    only right    ID HEMORRHOIDECTOMY,INT/EXT,1 COLUMN/GROUP N/A 2/1/2018    HEMORRHOIDECTOMY performed by Yfn Moe MD at [address] Right 2002    right    ROTATOR CUFF REPAIR Left 2003    left    TOE AMPUTATION Left 1969    1st and 2nd toes distal tips d/t  accident    TONSILLECTOMY  1939    UTERINE FIBROID SURGERY  1964       Family History   Problem Relation Age of Onset    Heart Disease Mother         enlarged heart    Cancer Father         bone    No Known Problems Sister        CareTeam (Including outside providers/suppliers regularly involved in providing care):   Patient Care Team:  Saige Aleman MD as PCP - General (Family Medicine)  Saige Aleman MD [facility] PCP [facility]  Patrick Soriano MD as Consulting Physician (Cardiology)  GAL Carvalho - CNP as Nurse Practitioner (Cardiology)  Audrey Zafar MD as Consulting Physician (Cardiac Electrophysiology)  Delilah Lamas MD as Consulting Physician (Urology)    Wt Readings from Last 3 Encounters:   08/14/18 152 lb 3.2 oz (69 kg)   06/18/18 153 lb (69.4 kg)   06/11/18 153 lb 12.8 oz (69.8 kg)     Vitals:    06/28/19 1253   BP: 124/70   Site: Right Upper Arm   Position: Sitting   Cuff Size: Small Adult   Pulse: 56   Temp: 97.7 °F (36.5 °C)   TempSrc: Temporal   SpO2: 96%   Height: 4' 11\" (1.499 m)     Body mass index is 30.74 kg/m². Based upon direct observation of the patient, evaluation of cognition reveals global memory impairment noted. Patient's complete Health Risk Assessment and screening values have been reviewed and are found in Flowsheets. The following problems were reviewed today and where indicated follow up appointments were made and/or referrals ordered. Positive Risk Factor Screenings with Interventions:     General Health:  General  In general, how would you say your health is?: Excellent  In the past 7 days, have you experienced any of the following? New or Increased Pain, New or Increased Fatigue, Loneliness, Social Isolation, Stress or Anger?: None of These  Do you get the social and emotional support that you need?: Yes  Do you have a Living Will?: (!) No  General Health Risk Interventions:  · No Living Will: additional information provided in office    Health Habits/Nutrition:  Health Habits/Nutrition  Do you exercise for at least 20 minutes 2-3 times per week?: Yes(Has been doing physical therapy and does home exercises at home)  Have you lost any weight without trying in the past 3 months?: No  Do you eat fewer than 2 meals per day?: No  Have you seen a dentist within the past year?: Yes  Body mass index is 30.74 kg/m².   Health Habits/Nutrition Interventions:  · Nutritional issues:  educational materials for healthy, well-balanced diet provided    Hearing/Vision:  Hearing/Vision  Do you or your family notice any trouble with your hearing?: No  Do you have difficulty driving, watching TV, or doing any of your daily activities because of your eyesight?: No  Have you had an eye exam within the past year?: (!) No  Hearing/Vision Interventions:  · Vision

## 2019-07-22 ENCOUNTER — HOSPITAL ENCOUNTER (OUTPATIENT)
Dept: LAB | Age: 84
Discharge: HOME | End: 2019-07-22
Payer: MEDICARE

## 2019-07-22 DIAGNOSIS — E55.9 VITAMIN D DEFICIENCY: ICD-10-CM

## 2019-07-22 DIAGNOSIS — N18.30 CKD (CHRONIC KIDNEY DISEASE) STAGE 3, GFR 30-59 ML/MIN (HCC): Chronic | ICD-10-CM

## 2019-07-22 DIAGNOSIS — I48.91 ATRIAL FIBRILLATION, UNSPECIFIED TYPE (HCC): Chronic | ICD-10-CM

## 2019-07-22 DIAGNOSIS — E78.5 HYPERLIPIDEMIA, UNSPECIFIED HYPERLIPIDEMIA TYPE: Chronic | ICD-10-CM

## 2019-07-22 DIAGNOSIS — E11.8 TYPE 2 DIABETES MELLITUS WITH COMPLICATION, WITHOUT LONG-TERM CURRENT USE OF INSULIN (HCC): Chronic | ICD-10-CM

## 2019-07-22 DIAGNOSIS — I10 ESSENTIAL HYPERTENSION: Chronic | ICD-10-CM

## 2019-07-22 LAB
ALBUMIN SERPL-MCNC: 4.1 G/DL (ref 3.5–4.6)
ALP BLD-CCNC: 80 U/L (ref 40–130)
ALT SERPL-CCNC: 13 U/L (ref 0–33)
ANION GAP SERPL CALCULATED.3IONS-SCNC: 19 MEQ/L (ref 9–15)
AST SERPL-CCNC: 25 U/L (ref 0–35)
BASOPHILS ABSOLUTE: 0.1 K/UL (ref 0–0.2)
BASOPHILS RELATIVE PERCENT: 1 %
BILIRUB SERPL-MCNC: <0.2 MG/DL (ref 0.2–0.7)
BUN BLDV-MCNC: 18 MG/DL (ref 8–23)
CALCIUM SERPL-MCNC: 9.4 MG/DL (ref 8.5–9.9)
CHLORIDE BLD-SCNC: 99 MEQ/L (ref 95–107)
CHOLESTEROL, TOTAL: 171 MG/DL (ref 0–199)
CO2: 22 MEQ/L (ref 20–31)
CREAT SERPL-MCNC: 1.23 MG/DL (ref 0.5–0.9)
EOSINOPHILS ABSOLUTE: 0.2 K/UL (ref 0–0.7)
EOSINOPHILS RELATIVE PERCENT: 2.9 %
GFR AFRICAN AMERICAN: 50.2
GFR NON-AFRICAN AMERICAN: 41.5
GLOBULIN: 3.9 G/DL (ref 2.3–3.5)
GLUCOSE BLD-MCNC: 206 MG/DL (ref 70–99)
HBA1C MFR BLD: 7.9 % (ref 4.8–5.9)
HCT VFR BLD CALC: 37.2 % (ref 37–47)
HDLC SERPL-MCNC: 46 MG/DL (ref 40–59)
HEMOGLOBIN: 12.1 G/DL (ref 12–16)
LDL CHOLESTEROL CALCULATED: 78 MG/DL (ref 0–129)
LYMPHOCYTES ABSOLUTE: 1.8 K/UL (ref 1–4.8)
LYMPHOCYTES RELATIVE PERCENT: 22.4 %
MCH RBC QN AUTO: 27.4 PG (ref 27–31.3)
MCHC RBC AUTO-ENTMCNC: 32.5 % (ref 33–37)
MCV RBC AUTO: 84.2 FL (ref 82–100)
MONOCYTES ABSOLUTE: 0.6 K/UL (ref 0.2–0.8)
MONOCYTES RELATIVE PERCENT: 7.2 %
NEUTROPHILS ABSOLUTE: 5.4 K/UL (ref 1.4–6.5)
NEUTROPHILS RELATIVE PERCENT: 66.5 %
PDW BLD-RTO: 15.7 % (ref 11.5–14.5)
PLATELET # BLD: 345 K/UL (ref 130–400)
POTASSIUM SERPL-SCNC: 5 MEQ/L (ref 3.4–4.9)
RBC # BLD: 4.42 M/UL (ref 4.2–5.4)
SODIUM BLD-SCNC: 140 MEQ/L (ref 135–144)
TOTAL PROTEIN: 8 G/DL (ref 6.3–8)
TRIGL SERPL-MCNC: 233 MG/DL (ref 0–150)
VITAMIN D 25-HYDROXY: 25.2 NG/ML (ref 30–100)
WBC # BLD: 8.2 K/UL (ref 4.8–10.8)

## 2019-07-22 PROCEDURE — 85025 COMPLETE CBC W/AUTO DIFF WBC: CPT

## 2019-07-22 PROCEDURE — 80061 LIPID PANEL: CPT

## 2019-07-22 PROCEDURE — 83036 HEMOGLOBIN GLYCOSYLATED A1C: CPT

## 2019-07-22 PROCEDURE — 80053 COMPREHEN METABOLIC PANEL: CPT

## 2019-07-22 PROCEDURE — 36415 COLL VENOUS BLD VENIPUNCTURE: CPT

## 2019-07-22 PROCEDURE — 82306 VITAMIN D 25 HYDROXY: CPT

## 2019-07-24 DIAGNOSIS — E55.9 VITAMIN D DEFICIENCY: Primary | ICD-10-CM

## 2019-07-24 DIAGNOSIS — E11.8 TYPE 2 DIABETES MELLITUS WITH COMPLICATION, WITHOUT LONG-TERM CURRENT USE OF INSULIN (HCC): ICD-10-CM

## 2019-07-24 DIAGNOSIS — E87.5 HYPERKALEMIA: ICD-10-CM

## 2019-07-24 RX ORDER — GLIPIZIDE 10 MG/1
10 TABLET ORAL
Qty: 180 TABLET | Refills: 1 | Status: SHIPPED | OUTPATIENT
Start: 2019-07-24 | End: 2020-02-03

## 2019-07-31 ENCOUNTER — HOSPITAL ENCOUNTER (OUTPATIENT)
Dept: WOMENS IMAGING | Age: 84
Discharge: HOME | End: 2019-08-02
Payer: MEDICARE

## 2019-07-31 ENCOUNTER — HOSPITAL ENCOUNTER (OUTPATIENT)
Dept: LAB | Age: 84
Discharge: HOME | End: 2019-07-31
Payer: MEDICARE

## 2019-07-31 DIAGNOSIS — E87.5 HYPERKALEMIA: ICD-10-CM

## 2019-07-31 DIAGNOSIS — M81.0 OSTEOPOROSIS WITHOUT CURRENT PATHOLOGICAL FRACTURE, UNSPECIFIED OSTEOPOROSIS TYPE: ICD-10-CM

## 2019-07-31 LAB — POTASSIUM SERPL-SCNC: 4.8 MEQ/L (ref 3.4–4.9)

## 2019-07-31 PROCEDURE — 36415 COLL VENOUS BLD VENIPUNCTURE: CPT

## 2019-07-31 PROCEDURE — 84132 ASSAY OF SERUM POTASSIUM: CPT

## 2019-07-31 PROCEDURE — 77080 DXA BONE DENSITY AXIAL: CPT

## 2019-08-07 DIAGNOSIS — M81.0 OSTEOPOROSIS WITHOUT CURRENT PATHOLOGICAL FRACTURE, UNSPECIFIED OSTEOPOROSIS TYPE: Primary | ICD-10-CM

## 2019-08-07 RX ORDER — ALENDRONATE SODIUM 70 MG/1
70 TABLET ORAL
Qty: 4 TABLET | Refills: 5 | Status: SHIPPED | OUTPATIENT
Start: 2019-08-07 | End: 2019-12-23

## 2019-09-30 ENCOUNTER — CARE COORDINATION (OUTPATIENT)
Dept: CARE COORDINATION | Age: 84
End: 2019-09-30

## 2019-10-09 ENCOUNTER — CARE COORDINATION (OUTPATIENT)
Dept: CARE COORDINATION | Age: 84
End: 2019-10-09

## 2019-10-15 ENCOUNTER — TELEPHONE (OUTPATIENT)
Dept: FAMILY MEDICINE CLINIC | Age: 84
End: 2019-10-15

## 2019-10-15 ENCOUNTER — OFFICE VISIT (OUTPATIENT)
Dept: FAMILY MEDICINE CLINIC | Age: 84
End: 2019-10-15
Payer: MEDICARE

## 2019-10-15 ENCOUNTER — HOSPITAL ENCOUNTER (OUTPATIENT)
Age: 84
Setting detail: SPECIMEN
Discharge: HOME | End: 2019-10-15
Payer: MEDICARE

## 2019-10-15 VITALS
OXYGEN SATURATION: 98 % | HEIGHT: 59 IN | BODY MASS INDEX: 30.48 KG/M2 | DIASTOLIC BLOOD PRESSURE: 78 MMHG | HEART RATE: 64 BPM | WEIGHT: 151.2 LBS | TEMPERATURE: 97.9 F | SYSTOLIC BLOOD PRESSURE: 132 MMHG | RESPIRATION RATE: 16 BRPM

## 2019-10-15 DIAGNOSIS — Z91.81 AT HIGH RISK FOR FALLS: ICD-10-CM

## 2019-10-15 DIAGNOSIS — R30.0 DYSURIA: ICD-10-CM

## 2019-10-15 DIAGNOSIS — R30.0 DYSURIA: Primary | ICD-10-CM

## 2019-10-15 DIAGNOSIS — N30.01 ACUTE CYSTITIS WITH HEMATURIA: ICD-10-CM

## 2019-10-15 LAB
BILIRUBIN, POC: ABNORMAL
BLOOD URINE, POC: ABNORMAL
CLARITY, POC: ABNORMAL
COLOR, POC: ABNORMAL
GLUCOSE URINE, POC: ABNORMAL
KETONES, POC: ABNORMAL
LEUKOCYTE EST, POC: ABNORMAL
NITRITE, POC: ABNORMAL
PH, POC: 6
PROTEIN, POC: ABNORMAL
SPECIFIC GRAVITY, POC: 1.02
UROBILINOGEN, POC: ABNORMAL

## 2019-10-15 PROCEDURE — 87186 SC STD MICRODIL/AGAR DIL: CPT

## 2019-10-15 PROCEDURE — G8417 CALC BMI ABV UP PARAM F/U: HCPCS | Performed by: FAMILY MEDICINE

## 2019-10-15 PROCEDURE — 4040F PNEUMOC VAC/ADMIN/RCVD: CPT | Performed by: FAMILY MEDICINE

## 2019-10-15 PROCEDURE — 1123F ACP DISCUSS/DSCN MKR DOCD: CPT | Performed by: FAMILY MEDICINE

## 2019-10-15 PROCEDURE — G8427 DOCREV CUR MEDS BY ELIG CLIN: HCPCS | Performed by: FAMILY MEDICINE

## 2019-10-15 PROCEDURE — 1036F TOBACCO NON-USER: CPT | Performed by: FAMILY MEDICINE

## 2019-10-15 PROCEDURE — G8484 FLU IMMUNIZE NO ADMIN: HCPCS | Performed by: FAMILY MEDICINE

## 2019-10-15 PROCEDURE — G8399 PT W/DXA RESULTS DOCUMENT: HCPCS | Performed by: FAMILY MEDICINE

## 2019-10-15 PROCEDURE — 87077 CULTURE AEROBIC IDENTIFY: CPT

## 2019-10-15 PROCEDURE — 99213 OFFICE O/P EST LOW 20 MIN: CPT | Performed by: FAMILY MEDICINE

## 2019-10-15 PROCEDURE — 81003 URINALYSIS AUTO W/O SCOPE: CPT | Performed by: FAMILY MEDICINE

## 2019-10-15 PROCEDURE — 87086 URINE CULTURE/COLONY COUNT: CPT

## 2019-10-15 PROCEDURE — 1090F PRES/ABSN URINE INCON ASSESS: CPT | Performed by: FAMILY MEDICINE

## 2019-10-15 RX ORDER — SULFAMETHOXAZOLE AND TRIMETHOPRIM 800; 160 MG/1; MG/1
1 TABLET ORAL 2 TIMES DAILY
Qty: 10 TABLET | Refills: 0 | Status: SHIPPED | OUTPATIENT
Start: 2019-10-15 | End: 2019-10-20

## 2019-10-16 RX ORDER — AMOXICILLIN AND CLAVULANATE POTASSIUM 875; 125 MG/1; MG/1
1 TABLET, FILM COATED ORAL 2 TIMES DAILY
Qty: 20 TABLET | Refills: 0 | Status: SHIPPED | OUTPATIENT
Start: 2019-10-16 | End: 2019-10-26

## 2019-10-18 DIAGNOSIS — N30.00 ACUTE CYSTITIS WITHOUT HEMATURIA: Primary | ICD-10-CM

## 2019-10-18 LAB
ORGANISM: ABNORMAL
URINE CULTURE, ROUTINE: ABNORMAL

## 2019-10-18 RX ORDER — NITROFURANTOIN 25; 75 MG/1; MG/1
100 CAPSULE ORAL 2 TIMES DAILY
Qty: 10 CAPSULE | Refills: 0 | Status: SHIPPED | OUTPATIENT
Start: 2019-10-18 | End: 2019-10-23

## 2019-10-19 ASSESSMENT — ENCOUNTER SYMPTOMS
CHEST TIGHTNESS: 0
VOMITING: 0
APNEA: 0
SHORTNESS OF BREATH: 0
NAUSEA: 0
DIARRHEA: 0
ABDOMINAL PAIN: 0
BLOOD IN STOOL: 0
COUGH: 0
CONSTIPATION: 0

## 2019-10-24 ENCOUNTER — OFFICE VISIT (OUTPATIENT)
Dept: FAMILY MEDICINE CLINIC | Age: 84
End: 2019-10-24
Payer: MEDICARE

## 2019-10-24 ENCOUNTER — CARE COORDINATION (OUTPATIENT)
Dept: CARE COORDINATION | Age: 84
End: 2019-10-24

## 2019-10-24 VITALS
TEMPERATURE: 97.6 F | SYSTOLIC BLOOD PRESSURE: 132 MMHG | OXYGEN SATURATION: 97 % | RESPIRATION RATE: 16 BRPM | DIASTOLIC BLOOD PRESSURE: 84 MMHG | HEART RATE: 60 BPM

## 2019-10-24 DIAGNOSIS — F32.A DEPRESSION, UNSPECIFIED DEPRESSION TYPE: Chronic | ICD-10-CM

## 2019-10-24 DIAGNOSIS — R19.7 DIARRHEA, UNSPECIFIED TYPE: ICD-10-CM

## 2019-10-24 DIAGNOSIS — N39.0 URINARY TRACT INFECTION WITHOUT HEMATURIA, SITE UNSPECIFIED: Primary | ICD-10-CM

## 2019-10-24 DIAGNOSIS — E55.9 VITAMIN D DEFICIENCY: ICD-10-CM

## 2019-10-24 DIAGNOSIS — I10 ESSENTIAL HYPERTENSION: Chronic | ICD-10-CM

## 2019-10-24 DIAGNOSIS — N18.30 CKD (CHRONIC KIDNEY DISEASE) STAGE 3, GFR 30-59 ML/MIN (HCC): Chronic | ICD-10-CM

## 2019-10-24 DIAGNOSIS — Z23 NEED FOR VACCINATION: ICD-10-CM

## 2019-10-24 DIAGNOSIS — E11.8 TYPE 2 DIABETES MELLITUS WITH COMPLICATION, WITHOUT LONG-TERM CURRENT USE OF INSULIN (HCC): Chronic | ICD-10-CM

## 2019-10-24 DIAGNOSIS — R53.83 FATIGUE, UNSPECIFIED TYPE: ICD-10-CM

## 2019-10-24 DIAGNOSIS — R06.09 DYSPNEA ON EXERTION: ICD-10-CM

## 2019-10-24 PROCEDURE — G8427 DOCREV CUR MEDS BY ELIG CLIN: HCPCS | Performed by: FAMILY MEDICINE

## 2019-10-24 PROCEDURE — 4040F PNEUMOC VAC/ADMIN/RCVD: CPT | Performed by: FAMILY MEDICINE

## 2019-10-24 PROCEDURE — 99214 OFFICE O/P EST MOD 30 MIN: CPT | Performed by: FAMILY MEDICINE

## 2019-10-24 PROCEDURE — G8482 FLU IMMUNIZE ORDER/ADMIN: HCPCS | Performed by: FAMILY MEDICINE

## 2019-10-24 PROCEDURE — 1036F TOBACCO NON-USER: CPT | Performed by: FAMILY MEDICINE

## 2019-10-24 PROCEDURE — 1123F ACP DISCUSS/DSCN MKR DOCD: CPT | Performed by: FAMILY MEDICINE

## 2019-10-24 PROCEDURE — G8399 PT W/DXA RESULTS DOCUMENT: HCPCS | Performed by: FAMILY MEDICINE

## 2019-10-24 PROCEDURE — 1090F PRES/ABSN URINE INCON ASSESS: CPT | Performed by: FAMILY MEDICINE

## 2019-10-24 PROCEDURE — G0008 ADMIN INFLUENZA VIRUS VAC: HCPCS | Performed by: FAMILY MEDICINE

## 2019-10-24 PROCEDURE — 90653 IIV ADJUVANT VACCINE IM: CPT | Performed by: FAMILY MEDICINE

## 2019-10-24 PROCEDURE — G8417 CALC BMI ABV UP PARAM F/U: HCPCS | Performed by: FAMILY MEDICINE

## 2019-10-24 ASSESSMENT — ENCOUNTER SYMPTOMS
SINUS PRESSURE: 0
CHEST TIGHTNESS: 0
RECTAL PAIN: 0
ABDOMINAL PAIN: 0
NAUSEA: 0
DIARRHEA: 1
WHEEZING: 1
SHORTNESS OF BREATH: 0
TROUBLE SWALLOWING: 0
CONSTIPATION: 0
COUGH: 1
BLOOD IN STOOL: 0
VOMITING: 0
SINUS PAIN: 0
ANAL BLEEDING: 0
EYE DISCHARGE: 0
SORE THROAT: 0
EYE REDNESS: 0

## 2019-10-25 ENCOUNTER — HOSPITAL ENCOUNTER (OUTPATIENT)
Age: 84
Setting detail: SPECIMEN
Discharge: HOME | End: 2019-10-25
Payer: MEDICARE

## 2019-10-25 DIAGNOSIS — N39.0 URINARY TRACT INFECTION WITHOUT HEMATURIA, SITE UNSPECIFIED: ICD-10-CM

## 2019-10-25 DIAGNOSIS — R53.83 FATIGUE, UNSPECIFIED TYPE: ICD-10-CM

## 2019-10-25 PROCEDURE — 87186 SC STD MICRODIL/AGAR DIL: CPT

## 2019-10-25 PROCEDURE — 87077 CULTURE AEROBIC IDENTIFY: CPT

## 2019-10-25 PROCEDURE — 87086 URINE CULTURE/COLONY COUNT: CPT

## 2019-10-28 DIAGNOSIS — N39.0 URINARY TRACT INFECTION WITHOUT HEMATURIA, SITE UNSPECIFIED: Primary | ICD-10-CM

## 2019-10-28 LAB
ORGANISM: ABNORMAL
URINE CULTURE, ROUTINE: ABNORMAL

## 2019-10-28 RX ORDER — AMOXICILLIN AND CLAVULANATE POTASSIUM 875; 125 MG/1; MG/1
1 TABLET, FILM COATED ORAL 2 TIMES DAILY
Qty: 20 TABLET | Refills: 0 | Status: SHIPPED | OUTPATIENT
Start: 2019-10-28 | End: 2019-11-07

## 2019-11-14 ENCOUNTER — OFFICE VISIT (OUTPATIENT)
Dept: FAMILY MEDICINE CLINIC | Age: 84
End: 2019-11-14
Payer: MEDICARE

## 2019-11-14 VITALS
DIASTOLIC BLOOD PRESSURE: 80 MMHG | HEIGHT: 59 IN | HEART RATE: 60 BPM | BODY MASS INDEX: 28.39 KG/M2 | SYSTOLIC BLOOD PRESSURE: 130 MMHG | TEMPERATURE: 98.6 F | WEIGHT: 140.8 LBS | RESPIRATION RATE: 12 BRPM

## 2019-11-14 DIAGNOSIS — E11.8 TYPE 2 DIABETES MELLITUS WITH COMPLICATION, WITHOUT LONG-TERM CURRENT USE OF INSULIN (HCC): Chronic | ICD-10-CM

## 2019-11-14 DIAGNOSIS — E78.5 HYPERLIPIDEMIA, UNSPECIFIED HYPERLIPIDEMIA TYPE: Chronic | ICD-10-CM

## 2019-11-14 DIAGNOSIS — Z23 NEED FOR VACCINATION: ICD-10-CM

## 2019-11-14 DIAGNOSIS — I48.91 ATRIAL FIBRILLATION, UNSPECIFIED TYPE (HCC): Primary | Chronic | ICD-10-CM

## 2019-11-14 DIAGNOSIS — F32.A DEPRESSION, UNSPECIFIED DEPRESSION TYPE: Chronic | ICD-10-CM

## 2019-11-14 DIAGNOSIS — E55.9 VITAMIN D DEFICIENCY: ICD-10-CM

## 2019-11-14 DIAGNOSIS — I10 ESSENTIAL HYPERTENSION: Chronic | ICD-10-CM

## 2019-11-14 DIAGNOSIS — F03.91 DEMENTIA WITH BEHAVIORAL DISTURBANCE, UNSPECIFIED DEMENTIA TYPE: Chronic | ICD-10-CM

## 2019-11-14 DIAGNOSIS — K21.9 GASTROESOPHAGEAL REFLUX DISEASE, ESOPHAGITIS PRESENCE NOT SPECIFIED: Chronic | ICD-10-CM

## 2019-11-14 DIAGNOSIS — N18.30 CKD (CHRONIC KIDNEY DISEASE) STAGE 3, GFR 30-59 ML/MIN (HCC): Chronic | ICD-10-CM

## 2019-11-14 DIAGNOSIS — M81.0 OSTEOPOROSIS WITHOUT CURRENT PATHOLOGICAL FRACTURE, UNSPECIFIED OSTEOPOROSIS TYPE: ICD-10-CM

## 2019-11-14 PROCEDURE — G8417 CALC BMI ABV UP PARAM F/U: HCPCS | Performed by: FAMILY MEDICINE

## 2019-11-14 PROCEDURE — G8427 DOCREV CUR MEDS BY ELIG CLIN: HCPCS | Performed by: FAMILY MEDICINE

## 2019-11-14 PROCEDURE — G8482 FLU IMMUNIZE ORDER/ADMIN: HCPCS | Performed by: FAMILY MEDICINE

## 2019-11-14 PROCEDURE — 1123F ACP DISCUSS/DSCN MKR DOCD: CPT | Performed by: FAMILY MEDICINE

## 2019-11-14 PROCEDURE — G8399 PT W/DXA RESULTS DOCUMENT: HCPCS | Performed by: FAMILY MEDICINE

## 2019-11-14 PROCEDURE — 1036F TOBACCO NON-USER: CPT | Performed by: FAMILY MEDICINE

## 2019-11-14 PROCEDURE — 4040F PNEUMOC VAC/ADMIN/RCVD: CPT | Performed by: FAMILY MEDICINE

## 2019-11-14 PROCEDURE — 99214 OFFICE O/P EST MOD 30 MIN: CPT | Performed by: FAMILY MEDICINE

## 2019-11-14 PROCEDURE — 1090F PRES/ABSN URINE INCON ASSESS: CPT | Performed by: FAMILY MEDICINE

## 2019-11-14 ASSESSMENT — ENCOUNTER SYMPTOMS
DIARRHEA: 0
ANAL BLEEDING: 0
SHORTNESS OF BREATH: 0
CHEST TIGHTNESS: 0
BLOOD IN STOOL: 0
CONSTIPATION: 0
ABDOMINAL PAIN: 0
NAUSEA: 0
WHEEZING: 0
VOMITING: 0
COUGH: 0

## 2019-11-22 ENCOUNTER — HOSPITAL ENCOUNTER (OUTPATIENT)
Dept: LAB | Age: 84
Discharge: HOME | End: 2019-11-22
Payer: MEDICARE

## 2019-11-22 DIAGNOSIS — E55.9 VITAMIN D DEFICIENCY: ICD-10-CM

## 2019-11-22 DIAGNOSIS — R06.09 DYSPNEA ON EXERTION: ICD-10-CM

## 2019-11-22 DIAGNOSIS — N39.0 URINARY TRACT INFECTION WITHOUT HEMATURIA, SITE UNSPECIFIED: ICD-10-CM

## 2019-11-22 DIAGNOSIS — N18.30 CKD (CHRONIC KIDNEY DISEASE) STAGE 3, GFR 30-59 ML/MIN (HCC): Chronic | ICD-10-CM

## 2019-11-22 DIAGNOSIS — I10 ESSENTIAL HYPERTENSION: Chronic | ICD-10-CM

## 2019-11-22 DIAGNOSIS — R53.83 FATIGUE, UNSPECIFIED TYPE: ICD-10-CM

## 2019-11-22 DIAGNOSIS — E11.8 TYPE 2 DIABETES MELLITUS WITH COMPLICATION, WITHOUT LONG-TERM CURRENT USE OF INSULIN (HCC): Chronic | ICD-10-CM

## 2019-11-22 LAB
ANION GAP SERPL CALCULATED.3IONS-SCNC: 14 MEQ/L (ref 9–15)
BASOPHILS ABSOLUTE: 0.1 K/UL (ref 0–0.2)
BASOPHILS RELATIVE PERCENT: 0.7 %
BUN BLDV-MCNC: 28 MG/DL (ref 8–23)
CALCIUM SERPL-MCNC: 9.6 MG/DL (ref 8.5–9.9)
CHLORIDE BLD-SCNC: 95 MEQ/L (ref 95–107)
CO2: 25 MEQ/L (ref 20–31)
CREAT SERPL-MCNC: 1.31 MG/DL (ref 0.5–0.9)
EOSINOPHILS ABSOLUTE: 0.2 K/UL (ref 0–0.7)
EOSINOPHILS RELATIVE PERCENT: 2.1 %
GFR AFRICAN AMERICAN: 46.7
GFR NON-AFRICAN AMERICAN: 38.6
GLUCOSE BLD-MCNC: 246 MG/DL (ref 70–99)
HBA1C MFR BLD: 8.2 % (ref 4.8–5.9)
HCT VFR BLD CALC: 35.8 % (ref 37–47)
HEMOGLOBIN: 11.4 G/DL (ref 12–16)
LYMPHOCYTES ABSOLUTE: 1.6 K/UL (ref 1–4.8)
LYMPHOCYTES RELATIVE PERCENT: 17 %
MCH RBC QN AUTO: 25.9 PG (ref 27–31.3)
MCHC RBC AUTO-ENTMCNC: 31.9 % (ref 33–37)
MCV RBC AUTO: 81.3 FL (ref 82–100)
MONOCYTES ABSOLUTE: 0.6 K/UL (ref 0.2–0.8)
MONOCYTES RELATIVE PERCENT: 6.3 %
NEUTROPHILS ABSOLUTE: 6.9 K/UL (ref 1.4–6.5)
NEUTROPHILS RELATIVE PERCENT: 73.9 %
PDW BLD-RTO: 16.5 % (ref 11.5–14.5)
PLATELET # BLD: 355 K/UL (ref 130–400)
POTASSIUM SERPL-SCNC: 4.7 MEQ/L (ref 3.4–4.9)
PRO-BNP: 981 PG/ML
RBC # BLD: 4.41 M/UL (ref 4.2–5.4)
SODIUM BLD-SCNC: 134 MEQ/L (ref 135–144)
TSH SERPL DL<=0.05 MIU/L-ACNC: 2.37 UIU/ML (ref 0.44–3.86)
VITAMIN D 25-HYDROXY: 68.6 NG/ML (ref 30–100)
WBC # BLD: 9.3 K/UL (ref 4.8–10.8)

## 2019-11-22 PROCEDURE — 83880 ASSAY OF NATRIURETIC PEPTIDE: CPT

## 2019-11-22 PROCEDURE — 82306 VITAMIN D 25 HYDROXY: CPT

## 2019-11-22 PROCEDURE — 80048 BASIC METABOLIC PNL TOTAL CA: CPT

## 2019-11-22 PROCEDURE — 85025 COMPLETE CBC W/AUTO DIFF WBC: CPT

## 2019-11-22 PROCEDURE — 36415 COLL VENOUS BLD VENIPUNCTURE: CPT

## 2019-11-22 PROCEDURE — 83036 HEMOGLOBIN GLYCOSYLATED A1C: CPT

## 2019-11-22 PROCEDURE — 84443 ASSAY THYROID STIM HORMONE: CPT

## 2019-11-26 ENCOUNTER — TELEPHONE (OUTPATIENT)
Dept: FAMILY MEDICINE CLINIC | Age: 84
End: 2019-11-26

## 2019-11-26 DIAGNOSIS — R63.4 WEIGHT LOSS: ICD-10-CM

## 2019-11-26 DIAGNOSIS — R63.0 DECREASED APPETITE: ICD-10-CM

## 2019-11-26 DIAGNOSIS — R62.7 FAILURE TO THRIVE IN ADULT: Primary | ICD-10-CM

## 2019-12-12 ENCOUNTER — OFFICE VISIT (OUTPATIENT)
Dept: PALLATIVE CARE | Age: 84
End: 2019-12-12
Payer: MEDICARE

## 2019-12-12 DIAGNOSIS — F32.A DEPRESSION, UNSPECIFIED DEPRESSION TYPE: ICD-10-CM

## 2019-12-12 DIAGNOSIS — M81.0 OSTEOPOROSIS WITHOUT CURRENT PATHOLOGICAL FRACTURE, UNSPECIFIED OSTEOPOROSIS TYPE: ICD-10-CM

## 2019-12-12 DIAGNOSIS — R62.7 ADULT FAILURE TO THRIVE: Primary | ICD-10-CM

## 2019-12-12 DIAGNOSIS — Z51.5 PALLIATIVE CARE ENCOUNTER: ICD-10-CM

## 2019-12-12 DIAGNOSIS — R63.4 WEIGHT LOSS: ICD-10-CM

## 2019-12-12 DIAGNOSIS — N18.30 CKD (CHRONIC KIDNEY DISEASE) STAGE 3, GFR 30-59 ML/MIN (HCC): ICD-10-CM

## 2019-12-12 DIAGNOSIS — F03.91 DEMENTIA WITH BEHAVIORAL DISTURBANCE, UNSPECIFIED DEMENTIA TYPE: ICD-10-CM

## 2019-12-12 PROCEDURE — 4040F PNEUMOC VAC/ADMIN/RCVD: CPT | Performed by: NURSE PRACTITIONER

## 2019-12-12 PROCEDURE — G8482 FLU IMMUNIZE ORDER/ADMIN: HCPCS | Performed by: NURSE PRACTITIONER

## 2019-12-12 PROCEDURE — 1090F PRES/ABSN URINE INCON ASSESS: CPT | Performed by: NURSE PRACTITIONER

## 2019-12-12 PROCEDURE — 1123F ACP DISCUSS/DSCN MKR DOCD: CPT | Performed by: NURSE PRACTITIONER

## 2019-12-12 PROCEDURE — 1036F TOBACCO NON-USER: CPT | Performed by: NURSE PRACTITIONER

## 2019-12-12 PROCEDURE — 99344 HOME/RES VST NEW MOD MDM 60: CPT | Performed by: NURSE PRACTITIONER

## 2019-12-12 PROCEDURE — G8417 CALC BMI ABV UP PARAM F/U: HCPCS | Performed by: NURSE PRACTITIONER

## 2019-12-12 RX ORDER — MIRTAZAPINE 7.5 MG/1
7.5 TABLET, FILM COATED ORAL NIGHTLY
Qty: 30 TABLET | Refills: 5 | Status: SHIPPED | OUTPATIENT
Start: 2019-12-12 | End: 2020-04-13 | Stop reason: ALTCHOICE

## 2019-12-12 ASSESSMENT — ENCOUNTER SYMPTOMS
CONSTIPATION: 0
COUGH: 0
SINUS PAIN: 0
VOMITING: 0
WHEEZING: 0
ABDOMINAL PAIN: 0
TROUBLE SWALLOWING: 0
CHEST TIGHTNESS: 0
NAUSEA: 0
DIARRHEA: 0
SHORTNESS OF BREATH: 0
BACK PAIN: 0
SORE THROAT: 0

## 2019-12-20 ENCOUNTER — OFFICE VISIT (OUTPATIENT)
Dept: PALLATIVE CARE | Age: 84
End: 2019-12-20
Payer: MEDICARE

## 2019-12-20 DIAGNOSIS — R62.7 ADULT FAILURE TO THRIVE: Primary | ICD-10-CM

## 2019-12-20 DIAGNOSIS — F03.91 DEMENTIA WITH BEHAVIORAL DISTURBANCE, UNSPECIFIED DEMENTIA TYPE: ICD-10-CM

## 2019-12-20 DIAGNOSIS — Z51.5 PALLIATIVE CARE ENCOUNTER: ICD-10-CM

## 2019-12-20 PROCEDURE — 1123F ACP DISCUSS/DSCN MKR DOCD: CPT | Performed by: NURSE PRACTITIONER

## 2019-12-20 PROCEDURE — 1036F TOBACCO NON-USER: CPT | Performed by: NURSE PRACTITIONER

## 2019-12-20 PROCEDURE — 1090F PRES/ABSN URINE INCON ASSESS: CPT | Performed by: NURSE PRACTITIONER

## 2019-12-20 PROCEDURE — 99348 HOME/RES VST EST LOW MDM 30: CPT | Performed by: NURSE PRACTITIONER

## 2019-12-20 PROCEDURE — G8417 CALC BMI ABV UP PARAM F/U: HCPCS | Performed by: NURSE PRACTITIONER

## 2019-12-20 PROCEDURE — 4040F PNEUMOC VAC/ADMIN/RCVD: CPT | Performed by: NURSE PRACTITIONER

## 2019-12-20 PROCEDURE — G8482 FLU IMMUNIZE ORDER/ADMIN: HCPCS | Performed by: NURSE PRACTITIONER

## 2019-12-20 ASSESSMENT — ENCOUNTER SYMPTOMS
SHORTNESS OF BREATH: 0
CHEST TIGHTNESS: 0
NAUSEA: 0
SINUS PAIN: 0
VOMITING: 0
TROUBLE SWALLOWING: 0
BACK PAIN: 0
WHEEZING: 0
CONSTIPATION: 0
SORE THROAT: 0
DIARRHEA: 0
COUGH: 0
ABDOMINAL PAIN: 0

## 2020-01-06 ENCOUNTER — OFFICE VISIT (OUTPATIENT)
Dept: PALLATIVE CARE | Age: 85
End: 2020-01-06
Payer: MEDICARE

## 2020-01-06 VITALS
HEART RATE: 61 BPM | BODY MASS INDEX: 27.59 KG/M2 | WEIGHT: 136.6 LBS | SYSTOLIC BLOOD PRESSURE: 104 MMHG | DIASTOLIC BLOOD PRESSURE: 67 MMHG | OXYGEN SATURATION: 98 % | TEMPERATURE: 97.8 F

## 2020-01-06 PROCEDURE — G8417 CALC BMI ABV UP PARAM F/U: HCPCS | Performed by: NURSE PRACTITIONER

## 2020-01-06 PROCEDURE — 1123F ACP DISCUSS/DSCN MKR DOCD: CPT | Performed by: NURSE PRACTITIONER

## 2020-01-06 PROCEDURE — 4040F PNEUMOC VAC/ADMIN/RCVD: CPT | Performed by: NURSE PRACTITIONER

## 2020-01-06 PROCEDURE — 1090F PRES/ABSN URINE INCON ASSESS: CPT | Performed by: NURSE PRACTITIONER

## 2020-01-06 PROCEDURE — G8482 FLU IMMUNIZE ORDER/ADMIN: HCPCS | Performed by: NURSE PRACTITIONER

## 2020-01-06 PROCEDURE — 1036F TOBACCO NON-USER: CPT | Performed by: NURSE PRACTITIONER

## 2020-01-06 PROCEDURE — 99348 HOME/RES VST EST LOW MDM 30: CPT | Performed by: NURSE PRACTITIONER

## 2020-01-06 ASSESSMENT — ENCOUNTER SYMPTOMS
TROUBLE SWALLOWING: 0
WHEEZING: 0
CHEST TIGHTNESS: 0
ABDOMINAL PAIN: 0
SORE THROAT: 0
COUGH: 0
NAUSEA: 0
BACK PAIN: 0
CONSTIPATION: 0
VOMITING: 0
DIARRHEA: 1
SHORTNESS OF BREATH: 0
SINUS PAIN: 0

## 2020-01-06 NOTE — PROGRESS NOTES
mouth daily 30 tablet 0    memantine (NAMENDA) 10 MG tablet Take 1/2 tablet daily x 7 days,then 1/2 tablet 2x daily x 7 days, then 1/2 tab in AM & 1tab @ dinner x7 days then 1 tab 2x daily thereafter      Handicap Placard MISC by Does not apply route DX: Dementia (F03.90), Type 2 Diabetes (E11.8), Atrial Fibrillation (I48.91)     EXPIRES: 05/2023 1 each 0    warfarin (COUMADIN) 2.5 MG tablet Take 2.5 mg by mouth daily Take daily as directed per physician based on INR results      docusate sodium (STOOL SOFTENER) 100 MG capsule Take 100 mg by mouth daily       Psyllium ([employer] NATURAL FIBER SUPPLEMENT PO) Take 1 tablet by mouth daily      ferrous sulfate (FE TABS) 325 (65 Fe) MG EC tablet Take 1 tablet by mouth daily (with breakfast) 90 tablet 1    acetaminophen (APAP EXTRA STRENGTH) 500 MG tablet Take 2 tablets by mouth 3 times daily as needed for Pain 60 tablet 3    Blood Glucose Monitoring Suppl TROY Use TID 1 Device 0    Glucose Blood (BLOOD GLUCOSE TEST STRIPS) STRP Use  strip 11    Lancets MISC 1 each by Does not apply route 3 times daily 100 each 11    Blood Glucose Monitoring Suppl (FREESTYLE FREEDOM) KIT Test once daily. DX: E11.8 1 kit 0    melatonin 3 MG TABS tablet Take 1 tablet by mouth nightly (Patient not taking: Reported on 11/14/2019) 30 tablet 3    sotalol (BETAPACE) 80 MG tablet Take 1 tablet by mouth 2 times daily (Patient taking differently: Take 80 mg by mouth 2 times daily Take 1 tablet twice daily) 180 tablet 1    Calcium Carbonate-Vitamin D (CALCIUM + D PO) Take 1 tablet by mouth daily      Compression Stockings MISC by Does not apply route. Knee high 1 each 0    atenolol (TENORMIN) 50 MG tablet Take 50 mg by mouth daily Take 1 tablet daily      atorvastatin (LIPITOR) 10 MG tablet Take 10 mg by mouth daily At bedtime      aspirin 81 MG tablet Take 81 mg by mouth daily. No current facility-administered medications on file prior to visit.         Review of Systems Constitutional: Negative for chills, fatigue, fever and unexpected weight change. HENT: Negative for congestion, mouth sores, sinus pain, sore throat and trouble swallowing. Respiratory: Negative for cough, chest tightness, shortness of breath and wheezing. Cardiovascular: Negative for chest pain, palpitations and leg swelling. Gastrointestinal: Positive for diarrhea. Negative for abdominal pain, constipation, nausea and vomiting. Endocrine: Negative for polydipsia, polyphagia and polyuria. Genitourinary: Negative for dysuria, flank pain, frequency and urgency. Musculoskeletal: Positive for gait problem. Negative for arthralgias, back pain, joint swelling and myalgias. Skin: Negative. Neurological: Positive for weakness. Negative for tremors, seizures, speech difficulty, numbness and headaches. Psychiatric/Behavioral: Positive for confusion. Negative for agitation, sleep disturbance and suicidal ideas. The patient is not nervous/anxious. Objective:   /67   Pulse 61   Temp 97.8 °F (36.6 °C)   Wt 136 lb 9.6 oz (62 kg)   SpO2 98%   BMI 27.59 kg/m²    Wt Readings from Last 3 Encounters:   01/06/20 136 lb 9.6 oz (62 kg)   11/14/19 140 lb 12.8 oz (63.9 kg)   10/15/19 151 lb 3.2 oz (68.6 kg)     Physical Exam  Constitutional:       Appearance: She is well-developed. HENT:      Head: Normocephalic and atraumatic. Mouth/Throat:      Mouth: Mucous membranes are moist.   Eyes:      Pupils: Pupils are equal, round, and reactive to light. Neck:      Musculoskeletal: Normal range of motion and neck supple. Cardiovascular:      Rate and Rhythm: Normal rate and regular rhythm. Heart sounds: No murmur. No friction rub. No gallop. Pulmonary:      Effort: Pulmonary effort is normal.      Breath sounds: Normal breath sounds. No wheezing or rales. Chest:      Chest wall: No tenderness. Abdominal:      General: Bowel sounds are normal. There is no distension. Palpations: Abdomen is soft. Tenderness: There is no tenderness. There is no guarding. Musculoskeletal: Normal range of motion. General: No tenderness or deformity. Skin:     General: Skin is warm and dry. Findings: Bruising present. No erythema or rash. Comments: L arm   Neurological:      Mental Status: She is alert. Mental status is at baseline. She is disoriented. Motor: Weakness present. Coordination: Coordination abnormal.      Gait: Gait abnormal.         Assessment and Plan:      1. Weight loss  2. Adult failure to thrive  -patient to continue with Remeron as ordered  -patient to continue with nutritional supplement as ordered  -will continue with current POC  -will re-evaluate weight on the next visit    3. Dementia with behavioral disturbance, unspecified dementia type ([facility] [address]  -patient remains on namenda and aricept  -follow up with neurology as ordered      4. Fall, initial encounter  -will continue with current POC  -patient to have 24 hour supervision  -discussed with caregiver to call Palliative care if patient has another fall or episode of diarrhea      5. Palliative care encounter  -follow up with cardiology, neurology and PCP as ordered  -follow up with Palliative care in 2 weeks. There are no discontinued medications. Discussed with patient/surrogate: POC, Treatment risks/benefits, and alternatives including as noted above. All questions were answered. Gave much active listening, presence, and emotional support. Due to acuity, symptomatology and high-risk medication management,   I advised patient to follow up in 2 weeks. Total Time 30 mins   > 50% Time Spent Counseling/Care coordination?  yes     GAL Manzano - NP    Collaborating Physician : Dr. Brooke Foster

## 2020-01-07 ENCOUNTER — CARE COORDINATION (OUTPATIENT)
Dept: CARE COORDINATION | Age: 85
End: 2020-01-07

## 2020-01-23 ENCOUNTER — OFFICE VISIT (OUTPATIENT)
Dept: PALLATIVE CARE | Age: 85
End: 2020-01-23
Payer: MEDICARE

## 2020-01-23 PROCEDURE — 1123F ACP DISCUSS/DSCN MKR DOCD: CPT | Performed by: NURSE PRACTITIONER

## 2020-01-23 PROCEDURE — G8482 FLU IMMUNIZE ORDER/ADMIN: HCPCS | Performed by: NURSE PRACTITIONER

## 2020-01-23 PROCEDURE — 4040F PNEUMOC VAC/ADMIN/RCVD: CPT | Performed by: NURSE PRACTITIONER

## 2020-01-23 PROCEDURE — G8417 CALC BMI ABV UP PARAM F/U: HCPCS | Performed by: NURSE PRACTITIONER

## 2020-01-23 PROCEDURE — 1090F PRES/ABSN URINE INCON ASSESS: CPT | Performed by: NURSE PRACTITIONER

## 2020-01-23 PROCEDURE — 99348 HOME/RES VST EST LOW MDM 30: CPT | Performed by: NURSE PRACTITIONER

## 2020-01-23 PROCEDURE — 1036F TOBACCO NON-USER: CPT | Performed by: NURSE PRACTITIONER

## 2020-01-23 ASSESSMENT — ENCOUNTER SYMPTOMS
TROUBLE SWALLOWING: 0
SHORTNESS OF BREATH: 0
SINUS PAIN: 0
ABDOMINAL PAIN: 0
CONSTIPATION: 0
NAUSEA: 0
COUGH: 0
BACK PAIN: 0
VOMITING: 0
CHEST TIGHTNESS: 0
WHEEZING: 0
DIARRHEA: 0
SORE THROAT: 0

## 2020-01-23 NOTE — PROGRESS NOTES
OU    COLONOSCOPY  2006    ENDOSCOPY, COLON, DIAGNOSTIC      HYSTERECTOMY  1974    KNEE ARTHROSCOPY Left 04/2007    left    LUMBAR LAMINECTOMY  1994    with disc surgery    MASTECTOMY Left 2010    left    OVARY REMOVAL Right 1974    only right    MA HEMORRHOIDECTOMY,INT/EXT,1 COLUMN/GROUP N/A 2/1/2018    HEMORRHOIDECTOMY performed by Jacob Lancaster MD at [facility] [address] Right 2002    right    ROTATOR CUFF REPAIR Left 2003    left    TOE AMPUTATION Left 1969    1st and 2nd toes distal tips d/t  accident   [address]     Social History     Socioeconomic History    Marital status:       Spouse name: Not on file    Number of children: Not on file    Years of education: Not on file    Highest education level: Not on file   Occupational History    Occupation: Retired   Social Needs    Financial resource strain: Not on file    Food insecurity:     Worry: Not on file     Inability: Not on file   [employer] needs:     Medical: Not on file     Non-medical: Not on file   Tobacco Use    Smoking status: Never Smoker    Smokeless tobacco: Never Used   Substance and Sexual Activity    Alcohol use: No    Drug use: No    Sexual activity: Not Currently   Lifestyle    Physical activity:     Days per week: Not on file     Minutes per session: Not on file    Stress: Not on file   Relationships    Social connections:     Talks on phone: Not on file     Gets together: Not on file     Attends Rastafarian service: Not on file     Active member of club or organization: Not on file     Attends meetings of clubs or organizations: Not on file     Relationship status: Not on file    Intimate partner violence:     Fear of current or ex partner: Not on file     Emotionally abused: Not on file     Physically abused: Not on file     Forced sexual activity: Not on file   Other Topics Concern    Not on file   Social History Narrative    Lives with a caregiver, son is POA and lives in [facility] [place]. Son checks on her in the evenings. 1 cat    1 dog     Family History   Problem Relation Age of Onset    Heart Disease Mother         enlarged heart    Cancer Father         bone    No Known Problems Sister      No Known Allergies  Current Outpatient Medications on File Prior to Visit   Medication Sig Dispense Refill    alendronate (FOSAMAX) 70 MG tablet Take 1 tablet by mouth every 7 days Take with a full glass of water on an empty stomach at least 30 minutes before the first food, beverage, or medication. Stay upright for at least 30 minutes and until after first food of the day. Do not crush or break.  4 tablet 5    mirtazapine (REMERON) 7.5 MG tablet Take 1 tablet by mouth nightly 30 tablet 5    enalapril (VASOTEC) 10 MG tablet Take 1 tablet by mouth daily 90 tablet 1    omeprazole (PRILOSEC) 20 MG delayed release capsule Take 1 capsule by mouth Daily 90 capsule 1    donepezil (ARICEPT) 10 MG tablet Take 1 tablet by mouth nightly 90 tablet 1    traZODone (DESYREL) 50 MG tablet Take 1 tablet by mouth nightly 30 tablet 5    sertraline (ZOLOFT) 50 MG tablet Take 1 tablet by mouth daily 90 tablet 1    Cholecalciferol (VITAMIN D3) 5000 units CAPS Take 1 capsule by mouth daily 90 capsule 3    glipiZIDE (GLUCOTROL) 10 MG tablet Take 1 tablet by mouth 2 times daily (before meals) 180 tablet 1    amLODIPine (NORVASC) 5 MG tablet Take 1 tablet by mouth daily 90 tablet 1    citalopram (CELEXA) 20 MG tablet Take 1 tablet by mouth daily 30 tablet 0    memantine (NAMENDA) 10 MG tablet Take 1/2 tablet daily x 7 days,then 1/2 tablet 2x daily x 7 days, then 1/2 tab in AM & 1tab @ dinner x7 days then 1 tab 2x daily thereafter      Handicap Placard MISC by Does not apply route DX: Dementia (F03.90), Type 2 Diabetes (E11.8), Atrial Fibrillation (I48.91)     EXPIRES: 05/2023 1 each 0    warfarin (COUMADIN) 2.5 MG tablet Take 2.5 mg by mouth daily Take daily Endocrine: Negative for polydipsia, polyphagia and polyuria. Genitourinary: Negative for dysuria, flank pain, frequency and urgency. Musculoskeletal: Positive for gait problem. Negative for arthralgias, back pain, joint swelling and myalgias. Skin: Negative. Neurological: Positive for weakness. Negative for tremors, seizures, speech difficulty, numbness and headaches. Psychiatric/Behavioral: Positive for confusion and decreased concentration. Negative for agitation, sleep disturbance and suicidal ideas. The patient is not nervous/anxious. Objective: There were no vitals taken for this visit. Wt Readings from Last 3 Encounters:   01/06/20 136 lb 9.6 oz (62 kg)   11/14/19 140 lb 12.8 oz (63.9 kg)   10/15/19 151 lb 3.2 oz (68.6 kg)     Physical Exam  Constitutional:       Appearance: She is well-developed and normal weight. HENT:      Head: Normocephalic and atraumatic. Eyes:      Pupils: Pupils are equal, round, and reactive to light. Neck:      Musculoskeletal: Normal range of motion and neck supple. Cardiovascular:      Rate and Rhythm: Normal rate and regular rhythm. Heart sounds: No murmur. No friction rub. No gallop. Pulmonary:      Effort: Pulmonary effort is normal.      Breath sounds: Normal breath sounds. No wheezing or rales. Chest:      Chest wall: No tenderness. Abdominal:      General: Bowel sounds are normal. There is no distension. Palpations: Abdomen is soft. Tenderness: There is no tenderness. There is no guarding. Musculoskeletal: Normal range of motion. General: No tenderness or deformity. Skin:     General: Skin is warm and dry. Findings: No erythema or rash. Neurological:      Mental Status: She is alert. Mental status is at baseline. She is disoriented. Motor: Weakness present. Coordination: Coordination abnormal.      Gait: Gait abnormal.         Assessment and Plan:      1.  Weight loss  -patient to continue on Remeron as ordered  -patient has been gaining weight steadily  -advised to decrease supplement to one per day  -will re-evaluate weight on next visit    2. Dementia with behavioral disturbance, unspecified dementia type ([facility] [address]  -continue with Namenda and Aricept as ordered  -follow up with neurology as ordered  3. Palliative care encounter  -follow up with Palliative care in 1 month      There are no discontinued medications. Discussed with patient/surrogate: POC, Treatment risks/benefits, and alternatives including as noted above. All questions were answered. Gave much active listening, presence, and emotional support. Due to acuity, symptomatology and high-risk medication management,   I advised patient to follow up in 1 month. Total Time 25mins   > 50% Time Spent Counseling/Care coordination?  yes     GAL Browning - NP    Collaborating Physician : Dr. Anna Comp

## 2020-02-05 ENCOUNTER — TELEPHONE (OUTPATIENT)
Dept: PALLATIVE CARE | Age: 85
End: 2020-02-05

## 2020-02-05 RX ORDER — NITROFURANTOIN 25; 75 MG/1; MG/1
100 CAPSULE ORAL 2 TIMES DAILY
Qty: 10 CAPSULE | Refills: 0 | Status: SHIPPED | OUTPATIENT
Start: 2020-02-05 | End: 2020-02-10

## 2020-02-05 NOTE — TELEPHONE ENCOUNTER
Leah, patient's private caregiver 15hrs/day called to report that Donovan Eduardo is having urinary incontinence, frequency, foul smelling urine and confusion at night for 3 days. Leah states that Donovan Eduardo has had many UTI's in the past and she believes Donovan Eduardo may have another.  She would like to speak with you for direction [phone]

## 2020-02-20 RX ORDER — DONEPEZIL HYDROCHLORIDE 10 MG/1
10 TABLET, FILM COATED ORAL NIGHTLY
Qty: 90 TABLET | Refills: 0 | Status: SHIPPED | OUTPATIENT
Start: 2020-02-20 | End: 2020-05-12

## 2020-02-20 RX ORDER — OMEPRAZOLE 20 MG/1
CAPSULE, DELAYED RELEASE ORAL
Qty: 90 CAPSULE | Refills: 0 | Status: SHIPPED | OUTPATIENT
Start: 2020-02-20 | End: 2020-05-26

## 2020-02-21 ENCOUNTER — OFFICE VISIT (OUTPATIENT)
Dept: PALLATIVE CARE | Age: 85
End: 2020-02-21
Payer: MEDICARE

## 2020-02-21 PROCEDURE — 1036F TOBACCO NON-USER: CPT | Performed by: NURSE PRACTITIONER

## 2020-02-21 PROCEDURE — G8417 CALC BMI ABV UP PARAM F/U: HCPCS | Performed by: NURSE PRACTITIONER

## 2020-02-21 PROCEDURE — 1090F PRES/ABSN URINE INCON ASSESS: CPT | Performed by: NURSE PRACTITIONER

## 2020-02-21 PROCEDURE — 99348 HOME/RES VST EST LOW MDM 30: CPT | Performed by: NURSE PRACTITIONER

## 2020-02-21 PROCEDURE — G8482 FLU IMMUNIZE ORDER/ADMIN: HCPCS | Performed by: NURSE PRACTITIONER

## 2020-02-21 PROCEDURE — 1123F ACP DISCUSS/DSCN MKR DOCD: CPT | Performed by: NURSE PRACTITIONER

## 2020-02-21 PROCEDURE — 4040F PNEUMOC VAC/ADMIN/RCVD: CPT | Performed by: NURSE PRACTITIONER

## 2020-02-21 ASSESSMENT — ENCOUNTER SYMPTOMS
SHORTNESS OF BREATH: 0
SINUS PAIN: 0
BACK PAIN: 0
COUGH: 0
CONSTIPATION: 0
ABDOMINAL PAIN: 0
CHEST TIGHTNESS: 0
NAUSEA: 0
WHEEZING: 0
TROUBLE SWALLOWING: 0
VOMITING: 0
SORE THROAT: 0
DIARRHEA: 0

## 2020-02-21 NOTE — PROGRESS NOTES
Subjective: Id: Patient was seen at home in [place] for symptom management and goals of care discussion. Patient was accompanied by : Leah-caregiver.    No chief complaint on file. HPI       Bailey Whyte is a 80year old female whom was seen today for follow up related to recent UTI, Dementia, weight loss and debility. Home visit is necessary in lieu of office due to significant frailty and high symptom burden from comorbid illnesses. Patient is alert in communication more than usual.  Patient has advanced dementia and she is confused at baseline. Patient finished atb therapy for UTI about 3-4 days ago. She has lost about 4 lbs in the last two weeks. Current weight is 138 lbs. Negative for cough, fever, SOB. Negative for nausea/ vomiting, diarrhea or constipation. BM daily. Patient ambulates with walker. Had a fall about two weeks ago at night. She fell in the bathroom and noted bruise on right elbow. Patient has episodes of intermittent incontinence of bladder, continent of bowel. Negative for increase in behaviors, anxiety. Caregiver reports a slight increase in confusion in the evenings.   Past Medical History:   Diagnosis Date    Atrial fibrillation ([address]     CKD (chronic kidney disease) stage 3, GFR 30-59 ml/min ([facility]) 5/15/2018    DDD (degenerative disc disease), lumbar 5/21/2018    Dementia with behavioral disturbance ([address] 12/9/2015    Depression     Diverticular disease 2004    ct pelvis    DJD (degenerative joint disease) of knee     both    GERD (gastroesophageal reflux disease)     GERD (gastroesophageal reflux disease) 5/15/2018    Hernia, hiatal     egd    History of cancer of left breast 4/27/2018 2010 s/p left mastectomy    HTN (hypertension) 6/27/2013    Hyperlipidemia     Insomnia     Malignant neoplasm of female breast ([address] 2010    left breast s/p mastectomy    Osteopenia 2003    Pancreas cyst 2007    Scoliosis 5/21/2018    Type 2 diabetes mellitus with complication, without long-term current use of insulin ([facility] 6/27/2013     Past Surgical History:   Procedure Laterality Date    APPENDECTOMY  1974    BLEPHAROPLASTY Bilateral 2011    cosmetic    CATARACT REMOVAL Left     OU    COLONOSCOPY  2006    ENDOSCOPY, COLON, DIAGNOSTIC      HYSTERECTOMY  1974    KNEE ARTHROSCOPY Left 04/2007    left    LUMBAR LAMINECTOMY  1994    with disc surgery    MASTECTOMY Left 2010    left    OVARY REMOVAL Right 1974    only right    [place] HEMORRHOIDECTOMY,INT/EXT,1 COLUMN/GROUP N/A 2/1/2018    HEMORRHOIDECTOMY performed by Jacob Lancaster MD at [facility] Right 2002    right    ROTATOR CUFF REPAIR Left 2003    left    TOE AMPUTATION Left 1969    1st and 2nd toes distal tips d/t  accident   [address]     Social History     Socioeconomic History    Marital status:       Spouse name: Not on file    Number of children: Not on file    Years of education: Not on file    Highest education level: Not on file   Occupational History    Occupation: Retired   Social Needs    Financial resource strain: Not on file    Food insecurity:     Worry: Not on file     Inability: Not on file   [employer] needs:     Medical: Not on file     Non-medical: Not on file   Tobacco Use    Smoking status: Never Smoker    Smokeless tobacco: Never Used   Substance and Sexual Activity    Alcohol use: No    Drug use: No    Sexual activity: Not Currently   Lifestyle    Physical activity:     Days per week: Not on file     Minutes per session: Not on file    Stress: Not on file   Relationships    Social connections:     Talks on phone: Not on file     Gets together: Not on file     Attends Jew service: Not on file     Active member of club or organization: Not on file     Attends meetings of clubs or organizations: Not on file     Relationship status: Not on file    Intimate partner violence: Fear of current or ex partner: Not on file     Emotionally abused: Not on file     Physically abused: Not on file     Forced sexual activity: Not on file   Other Topics Concern    Not on file   Social History Narrative    Lives with a caregiver, son is POA and lives in [facility]. Son checks on her in the evenings. 1 cat    1 dog     Family History   Problem Relation Age of Onset    Heart Disease Mother         enlarged heart    Cancer Father         bone    No Known Problems Sister      No Known Allergies  Current Outpatient Medications on File Prior to Visit   Medication Sig Dispense Refill    donepezil (ARICEPT) 10 MG tablet TAKE 1 TABLET BY MOUTH NIGHTLY 90 tablet 0    omeprazole (PRILOSEC) 20 MG delayed release capsule TAKE 1 CAPSULE BY MOUTH ONCE DAILY 90 capsule 0    traZODone (DESYREL) 50 MG tablet Take 1 tablet by mouth nightly 90 tablet 1    glipiZIDE (GLUCOTROL) 10 MG tablet Take 1 tablet by mouth 2 times daily (before meals) 180 tablet 1    amLODIPine (NORVASC) 5 MG tablet Take 1 tablet by mouth daily 90 tablet 1    alendronate (FOSAMAX) 70 MG tablet Take 1 tablet by mouth every 7 days Take with a full glass of water on an empty stomach at least 30 minutes before the first food, beverage, or medication. Stay upright for at least 30 minutes and until after first food of the day. Do not crush or break.  4 tablet 5    mirtazapine (REMERON) 7.5 MG tablet Take 1 tablet by mouth nightly 30 tablet 5    enalapril (VASOTEC) 10 MG tablet Take 1 tablet by mouth daily 90 tablet 1    sertraline (ZOLOFT) 50 MG tablet Take 1 tablet by mouth daily 90 tablet 1    Cholecalciferol (VITAMIN D3) 5000 units CAPS Take 1 capsule by mouth daily 90 capsule 3    citalopram (CELEXA) 20 MG tablet Take 1 tablet by mouth daily 30 tablet 0    memantine (NAMENDA) 10 MG tablet Take 1/2 tablet daily x 7 days,then 1/2 tablet 2x daily x 7 days, then 1/2 tab in AM & 1tab @ dinner x7 days then 1 tab 2x daily thereafter  Handicap Placard MISC by Does not apply route DX: Dementia (F03.90), Type 2 Diabetes (E11.8), Atrial Fibrillation (I48.91)     EXPIRES: 05/2023 1 each 0    warfarin (COUMADIN) 2.5 MG tablet Take 2.5 mg by mouth daily Take daily as directed per physician based on INR results      docusate sodium (STOOL SOFTENER) 100 MG capsule Take 100 mg by mouth daily       Psyllium ([employer] NATURAL FIBER SUPPLEMENT PO) Take 1 tablet by mouth daily      ferrous sulfate (FE TABS) 325 (65 Fe) MG EC tablet Take 1 tablet by mouth daily (with breakfast) 90 tablet 1    acetaminophen (APAP EXTRA STRENGTH) 500 MG tablet Take 2 tablets by mouth 3 times daily as needed for Pain 60 tablet 3    Blood Glucose Monitoring Suppl TROY Use TID 1 Device 0    Glucose Blood (BLOOD GLUCOSE TEST STRIPS) STRP Use  strip 11    Lancets MISC 1 each by Does not apply route 3 times daily 100 each 11    Blood Glucose Monitoring Suppl (FREESTYLE FREEDOM) KIT Test once daily. DX: E11.8 1 kit 0    melatonin 3 MG TABS tablet Take 1 tablet by mouth nightly (Patient not taking: Reported on 11/14/2019) 30 tablet 3    sotalol (BETAPACE) 80 MG tablet Take 1 tablet by mouth 2 times daily (Patient taking differently: Take 80 mg by mouth 2 times daily Take 1 tablet twice daily) 180 tablet 1    Calcium Carbonate-Vitamin D (CALCIUM + D PO) Take 1 tablet by mouth daily      Compression Stockings MISC by Does not apply route. Knee high 1 each 0    atenolol (TENORMIN) 50 MG tablet Take 50 mg by mouth daily Take 1 tablet daily      atorvastatin (LIPITOR) 10 MG tablet Take 10 mg by mouth daily At bedtime      aspirin 81 MG tablet Take 81 mg by mouth daily. No current facility-administered medications on file prior to visit. Review of Systems   Constitutional: Positive for appetite change and fatigue. Negative for chills, fever and unexpected weight change.    HENT: Negative for congestion, mouth sores, sinus pain, sore throat and trouble alert. Mental status is at baseline. She is disoriented. Motor: Weakness present. Coordination: Coordination abnormal.      Gait: Gait abnormal.         Assessment and Plan:      1. Weight loss  -patient has lost about 4-5 lbs since last visit  -patient finished atb therapy for UTI about 3-4 days ago  -currently drinking about 1-2 ensure per day  -patient started to eat better since yesterday  -continues on Remeron as scheduled  2. Dementia with behavioral disturbance, unspecified dementia type ([facility]  -follow up with neurology as scheduled  -continues with namenda and aricept as ordered  3. Fall, initial encounter  -patient to continue to ambulate with walker  -offered PT will hold off till next visit     4. Palliative care encounter  -patient to follow up with cardiology as scheduled  -follow up with PCP as scheduled      There are no discontinued medications. Discussed with patient/surrogate: POC, Treatment risks/benefits, and alternatives including as noted above. All questions were answered. Gave much active listening, presence, and emotional support. Due to acuity, symptomatology and high-risk medication management,   I advised patient to follow up in 1 month. Total Time 30 mins   > 50% Time Spent Counseling/Care coordination?  yes     GAL Munoz - NP    Collaborating Physician : Dr. Donna Wade

## 2020-03-24 ENCOUNTER — TELEPHONE (OUTPATIENT)
Dept: PALLATIVE CARE | Age: 85
End: 2020-03-24

## 2020-03-26 ENCOUNTER — OFFICE VISIT (OUTPATIENT)
Dept: PALLATIVE CARE | Age: 85
End: 2020-03-26
Payer: MEDICARE

## 2020-03-26 PROCEDURE — G8482 FLU IMMUNIZE ORDER/ADMIN: HCPCS | Performed by: NURSE PRACTITIONER

## 2020-03-26 PROCEDURE — 4040F PNEUMOC VAC/ADMIN/RCVD: CPT | Performed by: NURSE PRACTITIONER

## 2020-03-26 PROCEDURE — G8417 CALC BMI ABV UP PARAM F/U: HCPCS | Performed by: NURSE PRACTITIONER

## 2020-03-26 PROCEDURE — 1036F TOBACCO NON-USER: CPT | Performed by: NURSE PRACTITIONER

## 2020-03-26 PROCEDURE — 1090F PRES/ABSN URINE INCON ASSESS: CPT | Performed by: NURSE PRACTITIONER

## 2020-03-26 PROCEDURE — 1123F ACP DISCUSS/DSCN MKR DOCD: CPT | Performed by: NURSE PRACTITIONER

## 2020-03-26 PROCEDURE — 99348 HOME/RES VST EST LOW MDM 30: CPT | Performed by: NURSE PRACTITIONER

## 2020-03-26 ASSESSMENT — ENCOUNTER SYMPTOMS
SHORTNESS OF BREATH: 0
COUGH: 0
VOMITING: 0
NAUSEA: 0
SINUS PAIN: 0
CHEST TIGHTNESS: 0
CONSTIPATION: 0
SORE THROAT: 0
DIARRHEA: 0
BACK PAIN: 0
ABDOMINAL PAIN: 0
TROUBLE SWALLOWING: 0
WHEEZING: 0

## 2020-03-26 NOTE — PROGRESS NOTES
Subjective:     ID: Patient was seen at home in [place] for symptom management and goals of care discussion. Patient was accompanied by caregiver:Leah  No chief complaint on file. ANY Us is an 80year old female whom was seen today for :dementia, debility, weight loss, agitation. Home visit is necessary in lieu of office due to significant frailty and high symptom burden from comorbid illnesses. Patient is alert but confused at baseline. Caregiver reported that patient had increased confusion about two days ago and increase in urinary frequency x 1day. Today patient presented calm and in NAD. Caregiver reports that she is voiding without any difficulty. She has also collected a urine sample which is clear yellow and without any sediment or any foul smell. Patient is negative for any fever, flank pain or any urgency or frequency for the past two days. Patient appetite is good. Her current weight is 146 lbs as per caregiver and she has gained about 6-8 lbs since last visit. Negative for any constipation, diarrhea, nausea or vomiting. Per caregiver occasional increase in agitation has been noted in the last few days. Currently taking Zoloft, Remeron and Celexa. Will consider medication adjustment if behaviors will increase and or worsens. Patient continues to ambulate with walker. No recent falls. Discussed with son POC as well and will update with patient condition.     Past Medical History:   Diagnosis Date    Atrial fibrillation ([address]     CKD (chronic kidney disease) stage 3, GFR 30-59 ml/min ([address] 5/15/2018    DDD (degenerative disc disease), lumbar 5/21/2018    Dementia with behavioral disturbance ([address] 12/9/2015    Depression     Diverticular disease 2004    ct pelvis    DJD (degenerative joint disease) of knee     both    GERD (gastroesophageal reflux disease)     GERD (gastroesophageal reflux disease) 5/15/2018    Hernia, hiatal     egd    History of cancer of left breast 4/27/2018    2010 s/p left mastectomy    HTN (hypertension) 6/27/2013    Hyperlipidemia     Insomnia     Malignant neoplasm of female breast ([facility] 2010    left breast s/p mastectomy    Osteopenia 2003    Pancreas cyst 2007    Scoliosis 5/21/2018    Type 2 diabetes mellitus with complication, without long-term current use of insulin ([facility] 6/27/2013     Past Surgical History:   Procedure Laterality Date    APPENDECTOMY  1974    BLEPHAROPLASTY Bilateral 2011    cosmetic    CATARACT REMOVAL Left     OU    COLONOSCOPY  2006    ENDOSCOPY, COLON, DIAGNOSTIC      HYSTERECTOMY  1974    KNEE ARTHROSCOPY Left 04/2007    left   [address]    with disc surgery    MASTECTOMY Left 2010    left    OVARY REMOVAL Right 1974    only right    [place] HEMORRHOIDECTOMY,INT/EXT,1 COLUMN/GROUP N/A 2/1/2018    HEMORR[facility]IDECTOMY performed by Uzair Cuevas [facility] [address] Right 2002    right    ROTATOR CUFF REPAIR Left 2003    left    TOE AMPUTATION Left 1969    1st and 2nd toes distal tips d/t  accident   [address]     Social History     Socioeconomic History    Marital status:       Spouse name: Not on file    Number of children: Not on file    Years of education: Not on file    Highest education level: Not on file   Occupational History    Occupation: Retired   Social Needs    Financial resource strain: Not on file    Food insecurity     Worry: Not on file     Inability: Not on file   [employer] needs     Medical: Not on file     Non-medical: Not on file   Tobacco Use    Smoking status: Never Smoker    Smokeless tobacco: Never Used   Substance and Sexual Activity    Alcohol use: No    Drug use: No    Sexual activity: Not Currently   Lifestyle    Physical activity     Days per week: Not on file     Minutes per session: Not on file    Stress: Not on file   Relationships    Social connections     Talks on phone: Not on file     Gets together: Not on file     Attends Mandaeism service: Not on file     Active member of club or organization: Not on file     Attends meetings of clubs or organizations: Not on file     Relationship status: Not on file    Intimate partner violence     Fear of current or ex partner: Not on file     Emotionally abused: Not on file     Physically abused: Not on file     Forced sexual activity: Not on file   Other Topics Concern    Not on file   Social History Narrative    Lives with a caregiver, son is POA and lives in [facility]. Son checks on her in the evenings. 1 cat    1 dog     Family History   Problem Relation Age of Onset    Heart Disease Mother         enlarged heart    Cancer Father         bone    No Known Problems Sister      No Known Allergies  Current Outpatient Medications on File Prior to Visit   Medication Sig Dispense Refill    donepezil (ARICEPT) 10 MG tablet TAKE 1 TABLET BY MOUTH NIGHTLY 90 tablet 0    omeprazole (PRILOSEC) 20 MG delayed release capsule TAKE 1 CAPSULE BY MOUTH ONCE DAILY 90 capsule 0    traZODone (DESYREL) 50 MG tablet Take 1 tablet by mouth nightly 90 tablet 1    glipiZIDE (GLUCOTROL) 10 MG tablet Take 1 tablet by mouth 2 times daily (before meals) 180 tablet 1    amLODIPine (NORVASC) 5 MG tablet Take 1 tablet by mouth daily 90 tablet 1    alendronate (FOSAMAX) 70 MG tablet Take 1 tablet by mouth every 7 days Take with a full glass of water on an empty stomach at least 30 minutes before the first food, beverage, or medication. Stay upright for at least 30 minutes and until after first food of the day. Do not crush or break.  4 tablet 5    mirtazapine (REMERON) 7.5 MG tablet Take 1 tablet by mouth nightly 30 tablet 5    enalapril (VASOTEC) 10 MG tablet Take 1 tablet by mouth daily 90 tablet 1    sertraline (ZOLOFT) 50 MG tablet Take 1 tablet by mouth daily 90 tablet 1    Cholecalciferol (VITAMIN D3) 5000 units CAPS Take 1 capsule by facility-administered medications on file prior to visit. Review of Systems   Constitutional: Negative for chills, fatigue, fever and unexpected weight change. HENT: Negative for congestion, mouth sores, sinus pain, sore throat and trouble swallowing. Respiratory: Negative for cough, chest tightness, shortness of breath and wheezing. Cardiovascular: Negative for chest pain, palpitations and leg swelling. Gastrointestinal: Negative for abdominal pain, constipation, diarrhea, nausea and vomiting. Endocrine: Negative for polydipsia, polyphagia and polyuria. Genitourinary: Positive for frequency. Negative for dysuria, flank pain and urgency. Musculoskeletal: Positive for gait problem. Negative for arthralgias, back pain, joint swelling and myalgias. Skin: Negative. Neurological: Negative for tremors, seizures, speech difficulty, weakness, numbness and headaches. Psychiatric/Behavioral: Positive for confusion. Negative for agitation, sleep disturbance and suicidal ideas. Objective: There were no vitals taken for this visit. Wt Readings from Last 3 Encounters:   01/06/20 136 lb 9.6 oz (62 kg)   11/14/19 140 lb 12.8 oz (63.9 kg)   10/15/19 151 lb 3.2 oz (68.6 kg)     Physical Exam  Constitutional:       Appearance: She is well-developed. HENT:      Head: Normocephalic and atraumatic. Eyes:      Pupils: Pupils are equal, round, and reactive to light. Neck:      Musculoskeletal: Normal range of motion and neck supple. Cardiovascular:      Rate and Rhythm: Normal rate and regular rhythm. Heart sounds: No murmur. No friction rub. No gallop. Pulmonary:      Effort: Pulmonary effort is normal.      Breath sounds: Normal breath sounds. No wheezing or rales. Chest:      Chest wall: No tenderness. Abdominal:      General: Bowel sounds are normal. There is no distension. Palpations: Abdomen is soft. Tenderness: There is no abdominal tenderness.  There is no

## 2020-04-13 ENCOUNTER — TELEPHONE (OUTPATIENT)
Dept: PALLATIVE CARE | Age: 85
End: 2020-04-13

## 2020-04-13 RX ORDER — GREEN TEA/HOODIA GORDONII 315-12.5MG
1 CAPSULE ORAL 2 TIMES DAILY
Qty: 60 TABLET | Refills: 0 | Status: SHIPPED | OUTPATIENT
Start: 2020-04-13 | End: 2020-05-05

## 2020-04-13 RX ORDER — NITROFURANTOIN 25; 75 MG/1; MG/1
100 CAPSULE ORAL 2 TIMES DAILY
Qty: 10 CAPSULE | Refills: 0 | Status: SHIPPED | OUTPATIENT
Start: 2020-04-13 | End: 2020-04-18

## 2020-04-13 RX ORDER — OLANZAPINE 2.5 MG/1
2.5 TABLET ORAL NIGHTLY
Qty: 30 TABLET | Refills: 3 | Status: SHIPPED | OUTPATIENT
Start: 2020-04-13 | End: 2020-05-20 | Stop reason: ALTCHOICE

## 2020-04-14 ENCOUNTER — TELEPHONE (OUTPATIENT)
Dept: PALLATIVE CARE | Age: 85
End: 2020-04-14

## 2020-04-14 NOTE — TELEPHONE ENCOUNTER
Patient's caregiver Leah called to say that she would like to send you a picture of the laceration Lisa Blackmon endured when she fell. She would like direction on how to treat [phone].

## 2020-04-15 ENCOUNTER — TELEPHONE (OUTPATIENT)
Dept: PALLATIVE CARE | Age: 85
End: 2020-04-15

## 2020-04-24 ENCOUNTER — VIRTUAL VISIT (OUTPATIENT)
Dept: PALLATIVE CARE | Age: 85
End: 2020-04-24
Payer: MEDICARE

## 2020-04-24 PROCEDURE — 99442 PR PHYS/QHP TELEPHONE EVALUATION 11-20 MIN: CPT | Performed by: NURSE PRACTITIONER

## 2020-04-24 ASSESSMENT — ENCOUNTER SYMPTOMS
TROUBLE SWALLOWING: 0
SORE THROAT: 0
ABDOMINAL PAIN: 0
VOMITING: 0
NAUSEA: 0
DIARRHEA: 0
CHEST TIGHTNESS: 0
COUGH: 0
WHEEZING: 0
BACK PAIN: 0
CONSTIPATION: 0
SHORTNESS OF BREATH: 0
SINUS PAIN: 0

## 2020-04-24 NOTE — PROGRESS NOTES
ct pelvis    DJD (degenerative joint disease) of knee     both    GERD (gastroesophageal reflux disease)     GERD (gastroesophageal reflux disease) 5/15/2018    Hernia, hiatal     egd    History of cancer of left breast 4/27/2018 2010 s/p left mastectomy    HTN (hypertension) 6/27/2013    Hyperlipidemia     Insomnia     Malignant neoplasm of female breast ([facility] 2010    left breast s/p mastectomy    Osteopenia 2003    Pancreas cyst 2007    Scoliosis 5/21/2018    Type 2 diabetes mellitus with complication, without long-term current use of insulin ([facility] [facility] 6/27/2013     Past Surgical History:   Procedure Laterality Date    APPENDECTOMY  1974    BLEPHAROPLASTY Bilateral 2011    cosmetic    CATARACT REMOVAL Left     OU    COLONOSCOPY  2006    ENDOSCOPY, COLON, DIAGNOSTIC      HYSTERECTOMY  1974    KNEE ARTHROSCOPY Left 04/2007    left   [address]    with disc surgery    MASTECTOMY Left 2010    left    OVARY REMOVAL Right 1974    only right    [place] HEMORRHOIDECTOMY,INT/EXT,1 COLUMN/GROUP N/A 2/1/2018    HEMORRHOIDECTOMY performed by Calvin Carlson MD at [address] Right 2002    right    ROTATOR CUFF REPAIR Left 2003    left    TOE AMPUTATION Left 1969    1st and 2nd toes distal tips d/t  accident   [address]     Social History     Socioeconomic History    Marital status:       Spouse name: Not on file    Number of children: Not on file    Years of education: Not on file    Highest education level: Not on file   Occupational History    Occupation: Retired   Social Needs    Financial resource strain: Not on file    Food insecurity     Worry: Not on file     Inability: Not on file   [employer] needs     Medical: Not on file     Non-medical: Not on file   Tobacco Use    Smoking status: Never Smoker    Smokeless tobacco: Never Used   Substance and Sexual Activity    Alcohol use: No     Compression Stockings MISC by Does not apply route. Knee high 1 each 0    atenolol (TENORMIN) 50 MG tablet Take 50 mg by mouth daily Take 1 tablet daily      atorvastatin (LIPITOR) 10 MG tablet Take 10 mg by mouth daily At bedtime      aspirin 81 MG tablet Take 81 mg by mouth daily. No current facility-administered medications on file prior to visit. Review of Systems   Constitutional: Negative for chills, fatigue, fever and unexpected weight change. HENT: Negative for congestion, mouth sores, sinus pain, sore throat and trouble swallowing. Respiratory: Negative for cough, chest tightness, shortness of breath and wheezing. Cardiovascular: Negative for chest pain, palpitations and leg swelling. Gastrointestinal: Negative for abdominal pain, constipation, diarrhea, nausea and vomiting. Endocrine: Negative for polydipsia, polyphagia and polyuria. Genitourinary: Negative for dysuria, flank pain and urgency. Musculoskeletal: Positive for gait problem. Negative for arthralgias, back pain, joint swelling and myalgias. Skin: Positive for wound. Neurological: Positive for weakness. Negative for tremors, seizures, speech difficulty, numbness and headaches. Psychiatric/Behavioral: Positive for confusion. Negative for agitation, sleep disturbance and suicidal ideas. Objective: There were no vitals taken for this visit. Wt Readings from Last 3 Encounters:   01/06/20 136 lb 9.6 oz (62 kg)   11/14/19 140 lb 12.8 oz (63.9 kg)   10/15/19 151 lb 3.2 oz (68.6 kg)     Physical Exam  Unable to perform-phone consult  Assessment and Plan:      1. Weight loss  -current weight 143.2 lbs  -lost about 3 lbs in the last two weeks  -remains on nutritional supplement  -discontinued Remeron recently  -will re-evaluate weight on next visit    2.  Fall, initial encounter  -Had a fall about 7-10 days ago  -noted large ski tear on forearm  -treatment order in place-has been improving with

## 2020-05-05 RX ORDER — GREEN TEA/HOODIA GORDONII 315-12.5MG
CAPSULE ORAL
Qty: 60 TABLET | Refills: 0 | Status: SHIPPED | OUTPATIENT
Start: 2020-05-05 | End: 2020-05-20 | Stop reason: ALTCHOICE

## 2020-05-12 ENCOUNTER — TELEPHONE (OUTPATIENT)
Dept: FAMILY MEDICINE CLINIC | Age: 85
End: 2020-05-12

## 2020-05-20 ENCOUNTER — VIRTUAL VISIT (OUTPATIENT)
Dept: PALLATIVE CARE | Age: 85
End: 2020-05-20
Payer: MEDICARE

## 2020-05-20 VITALS — BODY MASS INDEX: 29.81 KG/M2 | WEIGHT: 147.6 LBS

## 2020-05-20 PROCEDURE — 99443 PR PHYS/QHP TELEPHONE EVALUATION 21-30 MIN: CPT | Performed by: NURSE PRACTITIONER

## 2020-05-20 ASSESSMENT — ENCOUNTER SYMPTOMS
VOMITING: 0
CONSTIPATION: 0
COUGH: 0
SORE THROAT: 0
BACK PAIN: 0
WHEEZING: 0
CHEST TIGHTNESS: 0
TROUBLE SWALLOWING: 0
NAUSEA: 0
SINUS PAIN: 0
DIARRHEA: 0
SHORTNESS OF BREATH: 0
ABDOMINAL PAIN: 0

## 2020-05-20 NOTE — PROGRESS NOTES
Subjective:   ID: Patient was consulted via phone conversation for symptom management and  goals of care discussion. Chief Complaint   Patient presents with    Follow-up     Time spent with Patient: 9 mins  Patient was made aware they will be billed for telephone service. Patient in agreement. Pt presents via telephonic contact due to COVID-19 pandemic emergency protocol. HPI      Teresa Urias is an 80year old female whom was seen today for :dementia, debility, weight loss. Patient presented today alert and in NAD. Most of the information was obtained from son d/t patient is confused at baseline. Per son, patient has been having episodes of being active for 1 -2 days  alternating with getting tired and sleepy for 1-2 days. Area on forearm has been healing nicely as per son. Patient does not have any urinary frequency, urgency, hematuria of fouls smelling urine. Patient appetite is fair. No recent weight loss. Continues on nutritional supplement. Current weight 147.6 lbs. Negative for any recent fall. Anxiety and depression at baseline. Noted increased activity at times. . Will discontinue Zyprexa.     Past Medical History:   Diagnosis Date    Atrial fibrillation ([address]     CKD (chronic kidney disease) stage 3, GFR 30-59 ml/min ([facility]) 5/15/2018    DDD (degenerative disc disease), lumbar 5/21/2018    Dementia with behavioral disturbance ([address] 12/9/2015    Depression     Diverticular disease 2004    ct pelvis    DJD (degenerative joint disease) of knee     both    GERD (gastroesophageal reflux disease)     GERD (gastroesophageal reflux disease) 5/15/2018    Hernia, hiatal     egd    History of cancer of left breast 4/27/2018 2010 s/p left mastectomy    HTN (hypertension) 6/27/2013    Hyperlipidemia     Insomnia     Malignant neoplasm of female breast ([address] 2010    left breast s/p mastectomy    Osteopenia 2003    Pancreas cyst 2007    Scoliosis 5/21/2018    Type 2 diabetes mellitus with current or ex partner: Not on file     Emotionally abused: Not on file     Physically abused: Not on file     Forced sexual activity: Not on file   Other Topics Concern    Not on file   Social History Narrative    Lives with a caregiver, son is POA and lives in [facility]. Son checks on her in the evenings. 1 cat    1 dog     Family History   Problem Relation Age of Onset    Heart Disease Mother         enlarged heart    Cancer Father         bone    No Known Problems Sister      No Known Allergies  Current Outpatient Medications on File Prior to Visit   Medication Sig Dispense Refill    donepezil (ARICEPT) 10 MG tablet Take 1 tablet by mouth nightly 90 tablet 3    Probiotic Acidophilus (FLORANEX) TABS Take 1 tablet by mouth twice daily 60 tablet 0    sertraline (ZOLOFT) 50 MG tablet Take 1 tablet by mouth daily 90 tablet 0    enalapril (VASOTEC) 10 MG tablet Take 1 tablet by mouth daily 90 tablet 0    OLANZapine (ZYPREXA) 2.5 MG tablet Take 1 tablet by mouth nightly 30 tablet 3    omeprazole (PRILOSEC) 20 MG delayed release capsule TAKE 1 CAPSULE BY MOUTH ONCE DAILY 90 capsule 0    traZODone (DESYREL) 50 MG tablet Take 1 tablet by mouth nightly 90 tablet 1    glipiZIDE (GLUCOTROL) 10 MG tablet Take 1 tablet by mouth 2 times daily (before meals) 180 tablet 1    amLODIPine (NORVASC) 5 MG tablet Take 1 tablet by mouth daily 90 tablet 1    alendronate (FOSAMAX) 70 MG tablet Take 1 tablet by mouth every 7 days Take with a full glass of water on an empty stomach at least 30 minutes before the first food, beverage, or medication. Stay upright for at least 30 minutes and until after first food of the day. Do not crush or break.  4 tablet 5    Cholecalciferol (VITAMIN D3) 5000 units CAPS Take 1 capsule by mouth daily 90 capsule 3    citalopram (CELEXA) 20 MG tablet Take 1 tablet by mouth daily 30 tablet 0    memantine (NAMENDA) 10 MG tablet Take 1/2 tablet daily x 7 days,then 1/2 tablet 2x daily x 7 days, then 1/2 tab in AM & 1tab @ dinner x7 days then 1 tab 2x daily thereafter      Handicap Placard MISC by Does not apply route DX: Dementia (F03.90), Type 2 Diabetes (E11.8), Atrial Fibrillation (I48.91)     EXPIRES: 05/2023 1 each 0    warfarin (COUMADIN) 2.5 MG tablet Take 2.5 mg by mouth daily Take daily as directed per physician based on INR results      docusate sodium (STOOL SOFTENER) 100 MG capsule Take 100 mg by mouth daily       Psyllium ([employer] NATURAL FIBER SUPPLEMENT PO) Take 1 tablet by mouth daily      ferrous sulfate (FE TABS) 325 (65 Fe) MG EC tablet Take 1 tablet by mouth daily (with breakfast) 90 tablet 1    acetaminophen (APAP EXTRA STRENGTH) 500 MG tablet Take 2 tablets by mouth 3 times daily as needed for Pain 60 tablet 3    Blood Glucose Monitoring Suppl TROY Use TID 1 Device 0    Glucose Blood (BLOOD GLUCOSE TEST STRIPS) STRP Use  strip 11    Lancets MISC 1 each by Does not apply route 3 times daily 100 each 11    Blood Glucose Monitoring Suppl (FREESTYLE FREEDOM) KIT Test once daily. DX: E11.8 1 kit 0    melatonin 3 MG TABS tablet Take 1 tablet by mouth nightly (Patient not taking: Reported on 11/14/2019) 30 tablet 3    sotalol (BETAPACE) 80 MG tablet Take 1 tablet by mouth 2 times daily (Patient taking differently: Take 80 mg by mouth 2 times daily Take 1 tablet twice daily) 180 tablet 1    Calcium Carbonate-Vitamin D (CALCIUM + D PO) Take 1 tablet by mouth daily      Compression Stockings MISC by Does not apply route. Knee high 1 each 0    atenolol (TENORMIN) 50 MG tablet Take 50 mg by mouth daily Take 1 tablet daily      atorvastatin (LIPITOR) 10 MG tablet Take 10 mg by mouth daily At bedtime      aspirin 81 MG tablet Take 81 mg by mouth daily. No current facility-administered medications on file prior to visit. Review of Systems   Constitutional: Negative for chills, fatigue, fever and unexpected weight change.    HENT: Negative for congestion, mouth sores, sinus pain, sore throat and trouble swallowing. Respiratory: Negative for cough, chest tightness, shortness of breath and wheezing. Cardiovascular: Negative for chest pain, palpitations and leg swelling. Gastrointestinal: Negative for abdominal pain, constipation, diarrhea, nausea and vomiting. Endocrine: Negative for polydipsia, polyphagia and polyuria. Genitourinary: Negative for dysuria, flank pain and urgency. Musculoskeletal: Positive for gait problem. Negative for arthralgias, back pain, joint swelling and myalgias. Skin: Positive for wound. Neurological: Positive for weakness. Negative for tremors, seizures, speech difficulty, numbness and headaches. Psychiatric/Behavioral: Positive for confusion. Negative for agitation, sleep disturbance and suicidal ideas. Objective: There were no vitals taken for this visit. Wt Readings from Last 3 Encounters:   01/06/20 136 lb 9.6 oz (62 kg)   11/14/19 140 lb 12.8 oz (63.9 kg)   10/15/19 151 lb 3.2 oz (68.6 kg)     Physical Exam  Phone consult-unable to perform  Assessment and Plan:      1. Dementia with behavioral disturbance, unspecified dementia type ([address]  -will discontinue Zyprexa and monitor patient behavior  -will re-evaluate patient on next visit  2. Weight loss  -current weight 147.6 lbs  -remains on nutritional supplement  -re-initiate  Remeron for appetite  -will re-evaluate weight on next visit    3. Adult failure to thrive  -overall slow steady decline  -frequent UTI-son purchased urine dipstick to check for UTI  -po intake fair    -monitor for s/s of UTI  -monitor for frequent falls  -steady functional and cognitive decline  4. Palliative care encounter  -follow up in one month. There are no discontinued medications. Discussed with patient/surrogate: POC, Treatment risks/benefits, and alternatives including as noted above. All questions were answered. Gave much active listening, presence, and emotional support. Due to acuity, symptomatology and high-risk medication management,   I advised patient to follow up in 1 month. Total Time 30mins   > 50% Time Spent Counseling/Care coordination?  yes     GAL Watkins - KOJO    Collaborating Physician : Dr. Kady Bolivar

## 2020-06-05 ENCOUNTER — TELEPHONE (OUTPATIENT)
Dept: PALLATIVE CARE | Age: 85
End: 2020-06-05

## 2020-06-12 ENCOUNTER — OFFICE VISIT (OUTPATIENT)
Dept: PALLATIVE CARE | Age: 85
End: 2020-06-12
Payer: MEDICARE

## 2020-06-12 VITALS
WEIGHT: 141 LBS | OXYGEN SATURATION: 95 % | SYSTOLIC BLOOD PRESSURE: 143 MMHG | HEART RATE: 64 BPM | DIASTOLIC BLOOD PRESSURE: 75 MMHG | TEMPERATURE: 98 F | BODY MASS INDEX: 28.48 KG/M2

## 2020-06-12 PROCEDURE — 99348 HOME/RES VST EST LOW MDM 30: CPT | Performed by: NURSE PRACTITIONER

## 2020-06-12 PROCEDURE — 4040F PNEUMOC VAC/ADMIN/RCVD: CPT | Performed by: NURSE PRACTITIONER

## 2020-06-12 PROCEDURE — 1090F PRES/ABSN URINE INCON ASSESS: CPT | Performed by: NURSE PRACTITIONER

## 2020-06-12 PROCEDURE — 1036F TOBACCO NON-USER: CPT | Performed by: NURSE PRACTITIONER

## 2020-06-12 PROCEDURE — G8417 CALC BMI ABV UP PARAM F/U: HCPCS | Performed by: NURSE PRACTITIONER

## 2020-06-12 PROCEDURE — 1123F ACP DISCUSS/DSCN MKR DOCD: CPT | Performed by: NURSE PRACTITIONER

## 2020-06-12 ASSESSMENT — ENCOUNTER SYMPTOMS
SHORTNESS OF BREATH: 0
SORE THROAT: 0
DIARRHEA: 0
WHEEZING: 0
BACK PAIN: 0
VOMITING: 0
TROUBLE SWALLOWING: 0
CHEST TIGHTNESS: 0
CONSTIPATION: 0
NAUSEA: 0
SINUS PAIN: 0
ABDOMINAL PAIN: 0
COUGH: 0

## 2020-06-12 NOTE — PROGRESS NOTES
Subjective: Id: Patient was consulted in [address] [place] [address] for symptom management and goals of care discussion. Chief Complaint   Patient presents with    Fatigue    Other     weight loss        HPI     James Perdomo is an 80year old female whom was seen today for :dementia, debility, weight loss. Patient presented today alert but confused at baseline. She was seen sitting in the reclining chair. Per caregiver patient has been more fatigued for about a week. She has been sleeping more during the day. Noted coughing episode x1 yesterday with meal. No cough since yesterday. Negative for fever, chills, mucus production. Patient continent of bowel/bladder most of the time. Occasional dribble-urine. Negative for urinary frequency, urgency , hematuria or foul smelling urine. Patient appetite is fair. Has lost about 7 lbs in one month. Currently on Remeron. Continues on nutritional supplement about 2 bottles per day. Current weight os 141.0 lbs. Able to ambulate with wheeled walker. No recent falls. Injury on right arm-resolved. Follow up with PCP on June 25th. Will recommend baseline labs. Anxiety and depression at baseline. Sleeps well.             Past Medical History:   Diagnosis Date    Atrial fibrillation ([address]     CKD (chronic kidney disease) stage 3, GFR 30-59 ml/min ([facility]) 5/15/2018    DDD (degenerative disc disease), lumbar 5/21/2018    Dementia with behavioral disturbance ([address] 12/9/2015    Depression     Diverticular disease 2004    ct pelvis    DJD (degenerative joint disease) of knee     both    GERD (gastroesophageal reflux disease)     GERD (gastroesophageal reflux disease) 5/15/2018    Hernia, hiatal     egd    History of cancer of left breast 4/27/2018 2010 s/p left mastectomy    HTN (hypertension) 6/27/2013    Hyperlipidemia     Insomnia     Malignant neoplasm of female breast ([address] 2010    left breast s/p mastectomy    Osteopenia 2003    Pancreas cyst 2007    Scoliosis 5/21/2018 Intimate partner violence     Fear of current or ex partner: Not on file     Emotionally abused: Not on file     Physically abused: Not on file     Forced sexual activity: Not on file   Other Topics Concern    Not on file   Social History Narrative    Lives with a caregiver, son is POA and lives in [facility]. Son checks on her in the evenings. 1 cat    1 dog     Family History   Problem Relation Age of Onset    Heart Disease Mother         enlarged heart    Cancer Father         bone    No Known Problems Sister      No Known Allergies  Current Outpatient Medications on File Prior to Visit   Medication Sig Dispense Refill    omeprazole (PRILOSEC) 20 MG delayed release capsule Take 1 capsule by mouth Daily Do not crush or break. 30 capsule 0    donepezil (ARICEPT) 10 MG tablet Take 1 tablet by mouth nightly 90 tablet 3    sertraline (ZOLOFT) 50 MG tablet Take 1 tablet by mouth daily 90 tablet 0    enalapril (VASOTEC) 10 MG tablet Take 1 tablet by mouth daily 90 tablet 0    traZODone (DESYREL) 50 MG tablet Take 1 tablet by mouth nightly 90 tablet 1    glipiZIDE (GLUCOTROL) 10 MG tablet Take 1 tablet by mouth 2 times daily (before meals) 180 tablet 1    amLODIPine (NORVASC) 5 MG tablet Take 1 tablet by mouth daily 90 tablet 1    alendronate (FOSAMAX) 70 MG tablet Take 1 tablet by mouth every 7 days Take with a full glass of water on an empty stomach at least 30 minutes before the first food, beverage, or medication. Stay upright for at least 30 minutes and until after first food of the day. Do not crush or break.  4 tablet 5    Cholecalciferol (VITAMIN D3) 5000 units CAPS Take 1 capsule by mouth daily 90 capsule 3    citalopram (CELEXA) 20 MG tablet Take 1 tablet by mouth daily 30 tablet 0    memantine (NAMENDA) 10 MG tablet Take 1/2 tablet daily x 7 days,then 1/2 tablet 2x daily x 7 days, then 1/2 tab in AM & 1tab @ dinner x7 days then 1 tab 2x daily thereafter      Handicap Placard MISC by Does

## 2020-06-17 RX ORDER — ALENDRONATE SODIUM 70 MG/1
70 TABLET ORAL
Qty: 12 TABLET | Refills: 1 | Status: SHIPPED | OUTPATIENT
Start: 2020-06-17 | End: 2020-06-25 | Stop reason: SDUPTHER

## 2020-06-25 ENCOUNTER — OFFICE VISIT (OUTPATIENT)
Dept: FAMILY MEDICINE CLINIC | Age: 85
End: 2020-06-25
Payer: MEDICARE

## 2020-06-25 VITALS
OXYGEN SATURATION: 95 % | BODY MASS INDEX: 29.52 KG/M2 | DIASTOLIC BLOOD PRESSURE: 62 MMHG | SYSTOLIC BLOOD PRESSURE: 108 MMHG | WEIGHT: 146.4 LBS | HEART RATE: 58 BPM | RESPIRATION RATE: 14 BRPM | HEIGHT: 59 IN | TEMPERATURE: 97.8 F

## 2020-06-25 LAB — HBA1C MFR BLD: 7.5 %

## 2020-06-25 PROCEDURE — 83036 HEMOGLOBIN GLYCOSYLATED A1C: CPT | Performed by: FAMILY MEDICINE

## 2020-06-25 PROCEDURE — 99214 OFFICE O/P EST MOD 30 MIN: CPT | Performed by: FAMILY MEDICINE

## 2020-06-25 PROCEDURE — 3051F HG A1C>EQUAL 7.0%<8.0%: CPT | Performed by: FAMILY MEDICINE

## 2020-06-25 RX ORDER — MIRTAZAPINE 7.5 MG/1
TABLET, FILM COATED ORAL
COMMUNITY
Start: 2020-05-23 | End: 2020-08-10 | Stop reason: SDUPTHER

## 2020-06-25 RX ORDER — ALENDRONATE SODIUM 70 MG/1
70 TABLET ORAL
Qty: 12 TABLET | Refills: 1 | Status: SHIPPED | OUTPATIENT
Start: 2020-06-25 | End: 2020-07-09 | Stop reason: SDUPTHER

## 2020-06-25 ASSESSMENT — PATIENT HEALTH QUESTIONNAIRE - PHQ9
2. FEELING DOWN, DEPRESSED OR HOPELESS: 0
SUM OF ALL RESPONSES TO PHQ QUESTIONS 1-9: 0
SUM OF ALL RESPONSES TO PHQ9 QUESTIONS 1 & 2: 0
1. LITTLE INTEREST OR PLEASURE IN DOING THINGS: 0
SUM OF ALL RESPONSES TO PHQ QUESTIONS 1-9: 0

## 2020-06-25 ASSESSMENT — ENCOUNTER SYMPTOMS
DIARRHEA: 0
ANAL BLEEDING: 0
BLOOD IN STOOL: 0
NAUSEA: 0
CHEST TIGHTNESS: 0
ABDOMINAL PAIN: 0
SHORTNESS OF BREATH: 0
COUGH: 0
VOMITING: 0
CONSTIPATION: 0
WHEEZING: 0

## 2020-06-25 NOTE — PROGRESS NOTES
(gastroesophageal reflux disease)     GERD (gastroesophageal reflux disease) 5/15/2018    Hernia, hiatal     egd    History of cancer of left breast 4/27/2018    2010 s/p left mastectomy    HTN (hypertension) 6/27/2013    Hyperlipidemia     Insomnia     Malignant neoplasm of female breast ([facility] 2010    left breast s/p mastectomy    Osteopenia 2003    Pancreas cyst 2007    Scoliosis 5/21/2018    Type 2 diabetes mellitus with complication, without long-term current use of insulin ([facility] 6/27/2013     Past Surgical History:   Procedure Laterality Date    APPENDECTOMY  1974    BLEPHAROPLASTY Bilateral 2011    cosmetic    CATARACT REMOVAL Left     OU    COLONOSCOPY  2006    ENDOSCOPY, COLON, DIAGNOSTIC      HYSTERECTOMY  1974    KNEE ARTHROSCOPY Left 04/2007    left   [address]    with disc surgery    MASTECTOMY Left 2010    left    OVARY REMOVAL Right 1974    only right    UT HEMORRHOIDECTOMY,INT/EXT,1 COLUMN/GROUP N/A 2/1/2018    HEMORRHOIDECTOMY performed by Nicholas Dakin, MD at [facility] [id number] Right 2002    right    ROTATOR CUFF REPAIR Left 2003    left    TOE AMPUTATION Left 1969    1st and 2nd toes distal tips d/t  accident    TONSILLECTOMY  1939    UTERINE FIBROID SURGERY  1964     Family History   Problem Relation Age of Onset    Heart Disease Mother         enlarged heart    Cancer Father         bone    No Known Problems Sister      Social History     Socioeconomic History    Marital status:       Spouse name: Not on file    Number of children: Not on file    Years of education: Not on file    Highest education level: Not on file   Occupational History    Occupation: Retired   Social Needs    Financial resource strain: Not on file    Food insecurity     Worry: Not on file     Inability: Not on file   [employer] needs     Medical: Not on file     Non-medical: Not on file   Tobacco Use    Smoking status: Never Smoker    Smokeless tobacco: Never Used   Substance and Sexual Activity    Alcohol use: No    Drug use: No    Sexual activity: Not Currently   Lifestyle    Physical activity     Days per week: Not on file     Minutes per session: Not on file    Stress: Not on file   Relationships    Social connections     Talks on phone: Not on file     Gets together: Not on file     Attends Taoism service: Not on file     Active member of club or organization: Not on file     Attends meetings of clubs or organizations: Not on file     Relationship status: Not on file    Intimate partner violence     Fear of current or ex partner: Not on file     Emotionally abused: Not on file     Physically abused: Not on file     Forced sexual activity: Not on file   Other Topics Concern    Not on file   Social History Narrative    Lives with a caregiver, son is POA and lives in [facility]. Son checks on her in the evenings. 1 cat    1 dog     Current Outpatient Medications on File Prior to Visit   Medication Sig Dispense Refill    mirtazapine (REMERON) 7.5 MG tablet TAKE 1 TABLET BY MOUTH NIGHTLY      omeprazole (PRILOSEC) 20 MG delayed release capsule Take 1 capsule by mouth Daily Do not crush or break.  30 capsule 0    donepezil (ARICEPT) 10 MG tablet Take 1 tablet by mouth nightly 90 tablet 3    sertraline (ZOLOFT) 50 MG tablet Take 1 tablet by mouth daily 90 tablet 0    enalapril (VASOTEC) 10 MG tablet Take 1 tablet by mouth daily 90 tablet 0    traZODone (DESYREL) 50 MG tablet Take 1 tablet by mouth nightly 90 tablet 1    glipiZIDE (GLUCOTROL) 10 MG tablet Take 1 tablet by mouth 2 times daily (before meals) 180 tablet 1    amLODIPine (NORVASC) 5 MG tablet Take 1 tablet by mouth daily 90 tablet 1    Cholecalciferol (VITAMIN D3) 5000 units CAPS Take 1 capsule by mouth daily 90 capsule 3    citalopram (CELEXA) 20 MG tablet Take 1 tablet by mouth daily 30 tablet 0    memantine (NAMENDA) 10 MG tablet Take 1/2 tablet daily x 7

## 2020-06-26 ENCOUNTER — HOSPITAL ENCOUNTER (OUTPATIENT)
Dept: LAB | Age: 85
Discharge: HOME | End: 2020-06-26
Payer: MEDICARE

## 2020-06-26 LAB
ALBUMIN SERPL-MCNC: 4.3 G/DL (ref 3.5–4.6)
ALP BLD-CCNC: 62 U/L (ref 40–130)
ALT SERPL-CCNC: 9 U/L (ref 0–33)
ANION GAP SERPL CALCULATED.3IONS-SCNC: 13 MEQ/L (ref 9–15)
AST SERPL-CCNC: 25 U/L (ref 0–35)
BASOPHILS ABSOLUTE: 0.1 K/UL (ref 0–0.2)
BASOPHILS RELATIVE PERCENT: 0.7 %
BILIRUB SERPL-MCNC: 0.3 MG/DL (ref 0.2–0.7)
BUN BLDV-MCNC: 23 MG/DL (ref 8–23)
CALCIUM SERPL-MCNC: 9.6 MG/DL (ref 8.5–9.9)
CHLORIDE BLD-SCNC: 98 MEQ/L (ref 95–107)
CHOLESTEROL, TOTAL: 209 MG/DL (ref 0–199)
CO2: 26 MEQ/L (ref 20–31)
CREAT SERPL-MCNC: 1.33 MG/DL (ref 0.5–0.9)
CREATININE URINE: 83.4 MG/DL
EOSINOPHILS ABSOLUTE: 0.3 K/UL (ref 0–0.7)
EOSINOPHILS RELATIVE PERCENT: 3.4 %
GFR AFRICAN AMERICAN: 45.8
GFR NON-AFRICAN AMERICAN: 37.8
GLOBULIN: 4 G/DL (ref 2.3–3.5)
GLUCOSE BLD-MCNC: 164 MG/DL (ref 70–99)
HCT VFR BLD CALC: 37.3 % (ref 37–47)
HDLC SERPL-MCNC: 52 MG/DL (ref 40–59)
HEMOGLOBIN: 12 G/DL (ref 12–16)
LDL CHOLESTEROL CALCULATED: 93 MG/DL (ref 0–129)
LYMPHOCYTES ABSOLUTE: 2.8 K/UL (ref 1–4.8)
LYMPHOCYTES RELATIVE PERCENT: 32.7 %
MCH RBC QN AUTO: 27 PG (ref 27–31.3)
MCHC RBC AUTO-ENTMCNC: 32.1 % (ref 33–37)
MCV RBC AUTO: 84.2 FL (ref 82–100)
MICROALBUMIN UR-MCNC: <1.2 MG/DL
MICROALBUMIN/CREAT UR-RTO: NORMAL MG/G (ref 0–30)
MONOCYTES ABSOLUTE: 0.5 K/UL (ref 0.2–0.8)
MONOCYTES RELATIVE PERCENT: 6.2 %
NEUTROPHILS ABSOLUTE: 4.9 K/UL (ref 1.4–6.5)
NEUTROPHILS RELATIVE PERCENT: 57 %
PDW BLD-RTO: 16.1 % (ref 11.5–14.5)
PLATELET # BLD: 365 K/UL (ref 130–400)
POTASSIUM SERPL-SCNC: 5 MEQ/L (ref 3.4–4.9)
RBC # BLD: 4.43 M/UL (ref 4.2–5.4)
SODIUM BLD-SCNC: 137 MEQ/L (ref 135–144)
TOTAL PROTEIN: 8.3 G/DL (ref 6.3–8)
TRIGL SERPL-MCNC: 318 MG/DL (ref 0–150)
VITAMIN D 25-HYDROXY: 78.4 NG/ML (ref 30–100)
WBC # BLD: 8.6 K/UL (ref 4.8–10.8)

## 2020-06-26 PROCEDURE — 80061 LIPID PANEL: CPT

## 2020-06-26 PROCEDURE — 82570 ASSAY OF URINE CREATININE: CPT

## 2020-06-26 PROCEDURE — 80053 COMPREHEN METABOLIC PANEL: CPT

## 2020-06-26 PROCEDURE — 82306 VITAMIN D 25 HYDROXY: CPT

## 2020-06-26 PROCEDURE — 82043 UR ALBUMIN QUANTITATIVE: CPT

## 2020-06-26 PROCEDURE — 85025 COMPLETE CBC W/AUTO DIFF WBC: CPT

## 2020-06-26 PROCEDURE — 36415 COLL VENOUS BLD VENIPUNCTURE: CPT

## 2020-07-09 ENCOUNTER — OFFICE VISIT (OUTPATIENT)
Dept: PALLATIVE CARE | Age: 85
End: 2020-07-09
Payer: MEDICARE

## 2020-07-09 VITALS
WEIGHT: 143.6 LBS | BODY MASS INDEX: 29 KG/M2 | HEART RATE: 60 BPM | OXYGEN SATURATION: 94 % | SYSTOLIC BLOOD PRESSURE: 111 MMHG | DIASTOLIC BLOOD PRESSURE: 66 MMHG | TEMPERATURE: 98 F

## 2020-07-09 PROCEDURE — 1090F PRES/ABSN URINE INCON ASSESS: CPT | Performed by: NURSE PRACTITIONER

## 2020-07-09 PROCEDURE — 1123F ACP DISCUSS/DSCN MKR DOCD: CPT | Performed by: NURSE PRACTITIONER

## 2020-07-09 PROCEDURE — 4040F PNEUMOC VAC/ADMIN/RCVD: CPT | Performed by: NURSE PRACTITIONER

## 2020-07-09 PROCEDURE — G8417 CALC BMI ABV UP PARAM F/U: HCPCS | Performed by: NURSE PRACTITIONER

## 2020-07-09 PROCEDURE — 99348 HOME/RES VST EST LOW MDM 30: CPT | Performed by: NURSE PRACTITIONER

## 2020-07-09 PROCEDURE — 1036F TOBACCO NON-USER: CPT | Performed by: NURSE PRACTITIONER

## 2020-07-09 RX ORDER — ALENDRONATE SODIUM 70 MG/1
70 TABLET ORAL
Qty: 12 TABLET | Refills: 1 | Status: SHIPPED | OUTPATIENT
Start: 2020-07-09 | End: 2021-01-01 | Stop reason: SDUPTHER

## 2020-07-09 NOTE — PROGRESS NOTES
left breast 4/27/2018    2010 s/p left mastectomy    HTN (hypertension) 6/27/2013    Hyperlipidemia     Insomnia     Malignant neoplasm of female breast ([facility] 2010    left breast s/p mastectomy    Osteopenia 2003    Pancreas cyst 2007    Scoliosis 5/21/2018    Type 2 diabetes mellitus with complication, without long-term current use of insulin ([facility] 6/27/2013     Past Surgical History:   Procedure Laterality Date    APPENDECTOMY  1974    BLEPHAROPLASTY Bilateral 2011    cosmetic    CATARACT REMOVAL Left     OU    COLONOSCOPY  2006    ENDOSCOPY, COLON, DIAGNOSTIC      HYSTERECTOMY  1974    KNEE ARTHROSCOPY Left 04/2007    left   [address]    with disc surgery    MASTECTOMY Left 2010    left    OVARY REMOVAL Right 1974    only right    MO HEMORRHOIDECTOMY,INT/EXT,1 COLUMN/GROUP N/A 2/1/2018    HEMORR[facility]IDECTOMY performed by [facility] Phillips [facility] at [facility] [address] Right 2002    right    ROTATOR CUFF REPAIR Left 2003    left    TOE AMPUTATION Left 1969    1st and 2nd toes distal tips d/t  accident   [address]     Social History     Socioeconomic History    Marital status:       Spouse name: Not on file    Number of children: Not on file    Years of education: Not on file    Highest education level: Not on file   Occupational History    Occupation: Retired   Social Needs    Financial resource strain: Not on file    Food insecurity     Worry: Not on file     Inability: Not on file   [employer] needs     Medical: Not on file     Non-medical: Not on file   Tobacco Use    Smoking status: Never Smoker    Smokeless tobacco: Never Used   Substance and Sexual Activity    Alcohol use: No    Drug use: No    Sexual activity: Not Currently   Lifestyle    Physical activity     Days per week: Not on file     Minutes per session: Not on file    Stress: Not on file   Relationships    Social connections Talks on phone: Not on file     Gets together: Not on file     Attends Congregational service: Not on file     Active member of club or organization: Not on file     Attends meetings of clubs or organizations: Not on file     Relationship status: Not on file    Intimate partner violence     Fear of current or ex partner: Not on file     Emotionally abused: Not on file     Physically abused: Not on file     Forced sexual activity: Not on file   Other Topics Concern    Not on file   Social History Narrative    Lives with a caregiver, son is POA and lives in [facility]. Son checks on her in the evenings.          1 cat    1 dog     Family History   Problem Relation Age of Onset    Heart Disease Mother         enlarged heart    Cancer Father         bone    No Known Problems Sister      No Known Allergies  Current Outpatient Medications on File Prior to Visit   Medication Sig Dispense Refill    omeprazole (PRILOSEC) 20 MG delayed release capsule Take 1 capsule by mouth Daily 90 capsule 1    mirtazapine (REMERON) 7.5 MG tablet TAKE 1 TABLET BY MOUTH NIGHTLY      donepezil (ARICEPT) 10 MG tablet Take 1 tablet by mouth nightly 90 tablet 3    sertraline (ZOLOFT) 50 MG tablet Take 1 tablet by mouth daily 90 tablet 0    enalapril (VASOTEC) 10 MG tablet Take 1 tablet by mouth daily 90 tablet 0    traZODone (DESYREL) 50 MG tablet Take 1 tablet by mouth nightly 90 tablet 1    glipiZIDE (GLUCOTROL) 10 MG tablet Take 1 tablet by mouth 2 times daily (before meals) 180 tablet 1    amLODIPine (NORVASC) 5 MG tablet Take 1 tablet by mouth daily 90 tablet 1    Cholecalciferol (VITAMIN D3) 5000 units CAPS Take 1 capsule by mouth daily 90 capsule 3    citalopram (CELEXA) 20 MG tablet Take 1 tablet by mouth daily 30 tablet 0    memantine (NAMENDA) 10 MG tablet Take 1/2 tablet daily x 7 days,then 1/2 tablet 2x daily x 7 days, then 1/2 tab in AM & 1tab @ dinner x7 days then 1 tab 2x daily thereafter      Handicap Placard MISC by Does not apply route DX: Dementia (F03.90), Type 2 Diabetes (E11.8), Atrial Fibrillation (I48.91)     EXPIRES: 05/2023 1 each 0    warfarin (COUMADIN) 2.5 MG tablet Take 2.5 mg by mouth daily Take daily as directed per physician based on INR results      docusate sodium (STOOL SOFTENER) 100 MG capsule Take 100 mg by mouth daily       Psyllium ([employer]S NATURAL FIBER SUPPLEMENT PO) Take 1 tablet by mouth daily      ferrous sulfate (FE TABS) 325 (65 Fe) MG EC tablet Take 1 tablet by mouth daily (with breakfast) 90 tablet 1    acetaminophen (APAP EXTRA STRENGTH) 500 MG tablet Take 2 tablets by mouth 3 times daily as needed for Pain 60 tablet 3    Blood Glucose Monitoring Suppl TROY Use TID 1 Device 0    Glucose Blood (BLOOD GLUCOSE TEST STRIPS) STRP Use  strip 11    Lancets MISC 1 each by Does not apply route 3 times daily 100 each 11    Blood Glucose Monitoring Suppl (FREESTYLE FREEDOM) KIT Test once daily. DX: E11.8 1 kit 0    sotalol (BETAPACE) 80 MG tablet Take 1 tablet by mouth 2 times daily (Patient taking differently: Take 80 mg by mouth 2 times daily Take 1 tablet twice daily) 180 tablet 1    Calcium Carbonate-Vitamin D (CALCIUM + D PO) Take 1 tablet by mouth daily      Compression Stockings MISC by Does not apply route. Knee high 1 each 0    atenolol (TENORMIN) 50 MG tablet Take 50 mg by mouth daily Take 1 tablet daily      atorvastatin (LIPITOR) 10 MG tablet Take 10 mg by mouth daily At bedtime      aspirin 81 MG tablet Take 81 mg by mouth daily. No current facility-administered medications on file prior to visit.         Review of Systems   Unable to perform ROS: Dementia       Objective:   /66   Pulse 60   Temp 98 °F (36.7 °C)   Wt 143 lb 9.6 oz (65.1 kg)   SpO2 94%   BMI 29.00 kg/m²    Wt Readings from Last 3 Encounters:   07/09/20 143 lb 9.6 oz (65.1 kg)   06/25/20 146 lb 6.4 oz (66.4 kg)   06/12/20 141 lb (64 kg)     Physical Exam  Neurological:      Motor: Weakness

## 2020-07-15 ENCOUNTER — TELEPHONE (OUTPATIENT)
Dept: PALLATIVE CARE | Age: 85
End: 2020-07-15

## 2020-07-15 RX ORDER — GREEN TEA/HOODIA GORDONII 315-12.5MG
1 CAPSULE ORAL 2 TIMES DAILY
Qty: 60 TABLET | Refills: 0 | Status: SHIPPED | OUTPATIENT
Start: 2020-07-15 | End: 2020-11-12 | Stop reason: SDUPTHER

## 2020-07-15 RX ORDER — NITROFURANTOIN 25; 75 MG/1; MG/1
100 CAPSULE ORAL 2 TIMES DAILY
Qty: 20 CAPSULE | Refills: 0 | Status: SHIPPED | OUTPATIENT
Start: 2020-07-15 | End: 2021-01-01

## 2020-07-24 RX ORDER — CIPROFLOXACIN 500 MG/1
500 TABLET, FILM COATED ORAL 2 TIMES DAILY
Qty: 14 TABLET | Refills: 0 | Status: SHIPPED | OUTPATIENT
Start: 2020-07-24 | End: 2020-07-31

## 2020-07-24 NOTE — PROGRESS NOTES
Spoke with her caregiver who tells me that Joby Porras has been on macrobid for six days without improvement. , she is still having hematuria, urgency, frequncy. Baseline confusion. Negative fever, chills, myalgias, or excessive fatigue, No urine cultur available. Discuss how as Joby Porras gets UITs every few months she could be developing a resistance to antibiotics. I can send out a change of antibiotics, but if symptoms worsen in any way they should present to ED as IV antibiotics may be needed.

## 2020-08-10 ENCOUNTER — OFFICE VISIT (OUTPATIENT)
Dept: PALLATIVE CARE | Age: 85
End: 2020-08-10
Payer: MEDICARE

## 2020-08-10 VITALS
BODY MASS INDEX: 28.64 KG/M2 | DIASTOLIC BLOOD PRESSURE: 85 MMHG | WEIGHT: 141.8 LBS | TEMPERATURE: 98 F | HEART RATE: 80 BPM | OXYGEN SATURATION: 99 % | SYSTOLIC BLOOD PRESSURE: 124 MMHG

## 2020-08-10 PROCEDURE — 1123F ACP DISCUSS/DSCN MKR DOCD: CPT | Performed by: NURSE PRACTITIONER

## 2020-08-10 PROCEDURE — G8417 CALC BMI ABV UP PARAM F/U: HCPCS | Performed by: NURSE PRACTITIONER

## 2020-08-10 PROCEDURE — 1036F TOBACCO NON-USER: CPT | Performed by: NURSE PRACTITIONER

## 2020-08-10 PROCEDURE — 99348 HOME/RES VST EST LOW MDM 30: CPT | Performed by: NURSE PRACTITIONER

## 2020-08-10 PROCEDURE — 1090F PRES/ABSN URINE INCON ASSESS: CPT | Performed by: NURSE PRACTITIONER

## 2020-08-10 PROCEDURE — 4040F PNEUMOC VAC/ADMIN/RCVD: CPT | Performed by: NURSE PRACTITIONER

## 2020-08-10 RX ORDER — CIPROFLOXACIN 500 MG/1
500 TABLET, FILM COATED ORAL 2 TIMES DAILY
Qty: 10 TABLET | Refills: 0 | Status: SHIPPED | OUTPATIENT
Start: 2020-08-10 | End: 2020-08-15

## 2020-08-10 RX ORDER — MIRTAZAPINE 7.5 MG/1
TABLET, FILM COATED ORAL
Qty: 30 TABLET | Refills: 3 | Status: SHIPPED | OUTPATIENT
Start: 2020-08-10 | End: 2020-10-05

## 2020-08-10 ASSESSMENT — ENCOUNTER SYMPTOMS
TROUBLE SWALLOWING: 0
VOMITING: 0
SORE THROAT: 0
CONSTIPATION: 0
DIARRHEA: 0
SINUS PAIN: 0
WHEEZING: 0
BACK PAIN: 0
ABDOMINAL PAIN: 0
SHORTNESS OF BREATH: 0
COUGH: 0
NAUSEA: 0
CHEST TIGHTNESS: 0

## 2020-08-10 NOTE — PROGRESS NOTES
Subjective:    ID: Patient was seen at home in [place] for symptom management and goals of care discussion. Patient was accompanied by : Leah-private caregiver. No chief complaint on file. ANY Delgado is an 80year old female whom was seen today for :dementia, debility, weight loss, gait instability, CKD,DM2, episodes of incontinence, frequent UTI's. Patient presented today alert but confused at baseline. She was working with PT upon my arrival. She has about one more week of therapy left. No recent falls. Patient has had another episode of UTI since last visit and was treated with Cipro. Denies any dysuria, hematuria or flank pain. Voiding without difficulty. Occasional episode of incontinence/ dribbles. Appetite is good. Current weight 141.8 lbs. Continues on nutritional supplement two bottles per day. Denies any nausea, vomiting diarrhea or constipation. Continues to ambulate with walker. Sleeps well. No concerns voiced.     Past Medical History:   Diagnosis Date    Atrial fibrillation ([address]     CKD (chronic kidney disease) stage 3, GFR 30-59 ml/min ([facility]) 5/15/2018    DDD (degenerative disc disease), lumbar 5/21/2018    Dementia with behavioral disturbance ([address] 12/9/2015    Depression     Diverticular disease 2004    ct pelvis    DJD (degenerative joint disease) of knee     both    GERD (gastroesophageal reflux disease)     GERD (gastroesophageal reflux disease) 5/15/2018    Hernia, hiatal     egd    History of cancer of left breast 4/27/2018 2010 s/p left mastectomy    HTN (hypertension) 6/27/2013    Hyperlipidemia     Insomnia     Malignant neoplasm of female breast ([address] 2010    left breast s/p mastectomy    Osteopenia 2003    Pancreas cyst 2007    Scoliosis 5/21/2018    Type 2 diabetes mellitus with complication, without long-term current use of insulin ([address] 6/27/2013     Past Surgical History:   Procedure Laterality Date    APPENDECTOMY  1974    BLEPHAROPLASTY Bilateral 2011    cosmetic    CATARACT REMOVAL Left     OU    COLONOSCOPY  2006    ENDOSCOPY, COLON, DIAGNOSTIC      HYSTERECTOMY  1974    KNEE ARTHROSCOPY Left 04/2007    left    LUMBAR LAMINECTOMY  1994    with disc surgery    MASTECTOMY Left 2010    left    OVARY REMOVAL Right 1974    only right    [place] HEMORRHOIDECTOMY,INT/EXT,1 COLUMN/GROUP N/A 2/1/2018    HEMORRHOIDECTOMY performed by Luis Hidalgo MD at [facility] Right 2002    right    ROTATOR CUFF REPAIR Left 2003    left    TOE AMPUTATION Left 1969    1st and 2nd toes distal tips d/t  accident   [address]     Social History     Socioeconomic History    Marital status:       Spouse name: Not on file    Number of children: Not on file    Years of education: Not on file    Highest education level: Not on file   Occupational History    Occupation: Retired   Social Needs    Financial resource strain: Not on file    Food insecurity     Worry: Not on file     Inability: Not on file   [employer] needs     Medical: Not on file     Non-medical: Not on file   Tobacco Use    Smoking status: Never Smoker    Smokeless tobacco: Never Used   Substance and Sexual Activity    Alcohol use: No    Drug use: No    Sexual activity: Not Currently   Lifestyle    Physical activity     Days per week: Not on file     Minutes per session: Not on file    Stress: Not on file   Relationships    Social connections     Talks on phone: Not on file     Gets together: Not on file     Attends Sabianist service: Not on file     Active member of club or organization: Not on file     Attends meetings of clubs or organizations: Not on file     Relationship status: Not on file    Intimate partner violence     Fear of current or ex partner: Not on file     Emotionally abused: Not on file     Physically abused: Not on file     Forced sexual activity: Not on file   Other Topics Concern    Not on file   Social History Narrative    Lives with a caregiver, son is POA and lives in [place]. Son checks on her in the evenings. 1 cat    1 dog     Family History   Problem Relation Age of Onset    Heart Disease Mother         enlarged heart    Cancer Father         bone    No Known Problems Sister      No Known Allergies  Current Outpatient Medications on File Prior to Visit   Medication Sig Dispense Refill    glipiZIDE (GLUCOTROL) 10 MG tablet Take 1 tablet by mouth 2 times daily (before meals) 180 tablet 1    amLODIPine (NORVASC) 5 MG tablet Take 1 tablet by mouth daily 90 tablet 1    sertraline (ZOLOFT) 50 MG tablet Take 1 tablet by mouth daily 90 tablet 1    enalapril (VASOTEC) 10 MG tablet Take 1 tablet by mouth daily 90 tablet 1    traZODone (DESYREL) 50 MG tablet Take 1 tablet by mouth nightly 90 tablet 1    nitrofurantoin, macrocrystal-monohydrate, (MACROBID) 100 MG capsule Take 1 capsule by mouth 2 times daily 20 capsule 0    Probiotic Acidophilus (FLORANEX) TABS Take 1 tablet by mouth 2 times daily 60 tablet 0    alendronate (FOSAMAX) 70 MG tablet Take 1 tablet by mouth every 7 days Take with a full glass of water on an empty stomach at least 30 minutes before the first food, beverage, or medication. Stay upright for at least 30 minutes and until after first food of the day. Do not crush or break.  12 tablet 1    omeprazole (PRILOSEC) 20 MG delayed release capsule Take 1 capsule by mouth Daily 90 capsule 1    donepezil (ARICEPT) 10 MG tablet Take 1 tablet by mouth nightly 90 tablet 3    Cholecalciferol (VITAMIN D3) 5000 units CAPS Take 1 capsule by mouth daily 90 capsule 3    memantine (NAMENDA) 10 MG tablet Take 1/2 tablet daily x 7 days,then 1/2 tablet 2x daily x 7 days, then 1/2 tab in AM & 1tab @ dinner x7 days then 1 tab 2x daily thereafter      Handicap Placard MISC by Does not apply route DX: Dementia (F03.90), Type 2 Diabetes (E11.8), Atrial Fibrillation (I48.91)     EXPIRES: 05/2023 1 each 0    warfarin (COUMADIN) 2.5 MG tablet Take 2.5 mg by mouth daily Take daily as directed per physician based on INR results      docusate sodium (STOOL SOFTENER) 100 MG capsule Take 100 mg by mouth daily       Psyllium ([employer] NATURAL FIBER SUPPLEMENT PO) Take 1 tablet by mouth daily      ferrous sulfate (FE TABS) 325 (65 Fe) MG EC tablet Take 1 tablet by mouth daily (with breakfast) 90 tablet 1    acetaminophen (APAP EXTRA STRENGTH) 500 MG tablet Take 2 tablets by mouth 3 times daily as needed for Pain 60 tablet 3    Blood Glucose Monitoring Suppl TROY Use TID 1 Device 0    Glucose Blood (BLOOD GLUCOSE TEST STRIPS) STRP Use  strip 11    Lancets MISC 1 each by Does not apply route 3 times daily 100 each 11    Blood Glucose Monitoring Suppl (FREESTYLE FREEDOM) KIT Test once daily. DX: E11.8 1 kit 0    sotalol (BETAPACE) 80 MG tablet Take 1 tablet by mouth 2 times daily (Patient taking differently: Take 80 mg by mouth 2 times daily Take 1 tablet twice daily) 180 tablet 1    Calcium Carbonate-Vitamin D (CALCIUM + D PO) Take 1 tablet by mouth daily      Compression Stockings MISC by Does not apply route. Knee high 1 each 0    atenolol (TENORMIN) 50 MG tablet Take 50 mg by mouth daily Take 1 tablet daily      atorvastatin (LIPITOR) 10 MG tablet Take 10 mg by mouth daily At bedtime      aspirin 81 MG tablet Take 81 mg by mouth daily. No current facility-administered medications on file prior to visit. Review of Systems   Unable to perform ROS: Dementia   Constitutional: Negative for chills, fatigue, fever and unexpected weight change. HENT: Negative for congestion, mouth sores, sinus pain, sore throat and trouble swallowing. Respiratory: Negative for cough, chest tightness, shortness of breath and wheezing. Cardiovascular: Negative for chest pain, palpitations and leg swelling.    Gastrointestinal: Negative for abdominal pain, constipation, diarrhea, nausea and vomiting. Endocrine: Negative for polydipsia, polyphagia and polyuria. Genitourinary: Negative for dysuria, flank pain, frequency and urgency. Musculoskeletal: Positive for gait problem. Negative for arthralgias, back pain, joint swelling and myalgias. Skin: Negative. Neurological: Positive for weakness. Negative for tremors, seizures, speech difficulty, numbness and headaches. Psychiatric/Behavioral: Negative for agitation, confusion, sleep disturbance and suicidal ideas. The patient is not nervous/anxious. Objective:   /85   Pulse 80   Temp 98 °F (36.7 °C)   Wt 141 lb 12.8 oz (64.3 kg)   SpO2 99%   BMI 28.64 kg/m²    Wt Readings from Last 3 Encounters:   08/10/20 141 lb 12.8 oz (64.3 kg)   07/09/20 143 lb 9.6 oz (65.1 kg)   06/25/20 146 lb 6.4 oz (66.4 kg)     Physical Exam  Constitutional:       Appearance: She is well-developed. HENT:      Head: Normocephalic and atraumatic. Eyes:      Pupils: Pupils are equal, round, and reactive to light. Neck:      Musculoskeletal: Normal range of motion and neck supple. Cardiovascular:      Rate and Rhythm: Normal rate and regular rhythm. Heart sounds: No murmur. No friction rub. No gallop. Pulmonary:      Effort: Pulmonary effort is normal.      Breath sounds: Normal breath sounds. No wheezing or rales. Chest:      Chest wall: No tenderness. Abdominal:      General: Bowel sounds are normal. There is no distension. Palpations: Abdomen is soft. Tenderness: There is no abdominal tenderness. There is no guarding. Musculoskeletal: Normal range of motion. General: No tenderness or deformity. Skin:     General: Skin is warm and dry. Findings: No erythema or rash. Neurological:      Mental Status: She is alert and oriented to person, place, and time. Motor: Weakness present.       Coordination: Coordination abnormal.      Gait: Gait abnormal.         Assessment and Plan:      1. Dementia with behavioral disturbance, unspecified dementia type ([facility]  -currently stable  -no recent behaviors  -on Namenda and Aricept  -frequent UTI's  -hx of falls  2. Unsteady gait  -currently working with PT    3. Urinary tract infection without hematuria, site unspecified  -recently finished atb therapy  -will have Cipro available for frequent UTI    4. Palliative care encounter  -follow up in one month      Medications Discontinued During This Encounter   Medication Reason    mirtazapine (REMERON) 7.5 MG tablet REORDER       Discussed with patient/surrogate: POC, Treatment risks/benefits, and alternatives including as noted above. All questions were answered. Gave much active listening, presence, and emotional support. Due to acuity, symptomatology and high-risk medication management,   I advised patient to follow up in 1 month. Total Time 30 mins   > 50% Time Spent Counseling/Care coordination?  yes     GAL Lim - NP    Collaborating Physician : Dr. Jose Grossman

## 2020-09-11 ENCOUNTER — OFFICE VISIT (OUTPATIENT)
Dept: PALLATIVE CARE | Age: 85
End: 2020-09-11
Payer: MEDICARE

## 2020-09-11 VITALS
OXYGEN SATURATION: 96 % | DIASTOLIC BLOOD PRESSURE: 53 MMHG | WEIGHT: 139.8 LBS | BODY MASS INDEX: 28.24 KG/M2 | HEART RATE: 66 BPM | TEMPERATURE: 97.2 F | SYSTOLIC BLOOD PRESSURE: 104 MMHG

## 2020-09-11 PROCEDURE — 1090F PRES/ABSN URINE INCON ASSESS: CPT | Performed by: NURSE PRACTITIONER

## 2020-09-11 PROCEDURE — 4040F PNEUMOC VAC/ADMIN/RCVD: CPT | Performed by: NURSE PRACTITIONER

## 2020-09-11 PROCEDURE — 99348 HOME/RES VST EST LOW MDM 30: CPT | Performed by: NURSE PRACTITIONER

## 2020-09-11 PROCEDURE — 1036F TOBACCO NON-USER: CPT | Performed by: NURSE PRACTITIONER

## 2020-09-11 PROCEDURE — G8417 CALC BMI ABV UP PARAM F/U: HCPCS | Performed by: NURSE PRACTITIONER

## 2020-09-11 PROCEDURE — 1123F ACP DISCUSS/DSCN MKR DOCD: CPT | Performed by: NURSE PRACTITIONER

## 2020-09-11 NOTE — PROGRESS NOTES
6/27/2013    Hyperlipidemia     Insomnia     Malignant neoplasm of female breast ([facility] 2010    left breast s/p mastectomy    Osteopenia 2003    Pancreas cyst 2007    Scoliosis 5/21/2018    Type 2 diabetes mellitus with complication, without long-term current use of insulin ([facility] 6/27/2013     Past Surgical History:   Procedure Laterality Date    APPENDECTOMY  1974    BLEPHAROPLASTY Bilateral 2011    cosmetic    CATARACT REMOVAL Left     OU    COLONOSCOPY  2006    ENDOSCOPY, COLON, DIAGNOSTIC      HYSTERECTOMY  1974    KNEE ARTHROSCOPY Left 04/2007    left    LUMBAR LAMINECTOMY  1994    with disc surgery    MASTECTOMY Left 2010    left    OVARY REMOVAL Right 1974    only right    [place] HEMORRHOIDECTOMY,INT/EXT,1 COLUMN/GROUP N/A 2/1/2018    HEMORRHOIDECTOMY performed by Giselle Mccullough [id number] Right 2002    right    ROTATOR CUFF REPAIR Left 2003    left    TOE AMPUTATION Left 1969    1st and 2nd toes distal tips d/t  accident   [address]     Social History     Socioeconomic History    Marital status:       Spouse name: Not on file    Number of children: Not on file    Years of education: Not on file    Highest education level: Not on file   Occupational History    Occupation: Retired   Social Needs    Financial resource strain: Not on file    Food insecurity     Worry: Not on file     Inability: Not on file   [employer] needs     Medical: Not on file     Non-medical: Not on file   Tobacco Use    Smoking status: Never Smoker    Smokeless tobacco: Never Used   Substance and Sexual Activity    Alcohol use: No    Drug use: No    Sexual activity: Not Currently   Lifestyle    Physical activity     Days per week: Not on file     Minutes per session: Not on file    Stress: Not on file   Relationships    Social connections     Talks on phone: Not on file     Gets together: Not on file     Attends [employer] service: Not on file     Active member of club or organization: Not on file     Attends meetings of clubs or organizations: Not on file     Relationship status: Not on file    Intimate partner violence     Fear of current or ex partner: Not on file     Emotionally abused: Not on file     Physically abused: Not on file     Forced sexual activity: Not on file   Other Topics Concern    Not on file   Social History Narrative    Lives with a caregiver, son is POA and lives in [facility]. Son checks on her in the evenings. 1 cat    1 dog     Family History   Problem Relation Age of Onset    Heart Disease Mother         enlarged heart    Cancer Father         bone    No Known Problems Sister      No Known Allergies  Current Outpatient Medications on File Prior to Visit   Medication Sig Dispense Refill    glipiZIDE (GLUCOTROL) 10 MG tablet Take 1 tablet by mouth 2 times daily (before meals) 180 tablet 1    amLODIPine (NORVASC) 5 MG tablet Take 1 tablet by mouth daily 90 tablet 1    mirtazapine (REMERON) 7.5 MG tablet TAKE 1 TABLET BY MOUTH NIGHTLY 30 tablet 3    sertraline (ZOLOFT) 50 MG tablet Take 1 tablet by mouth daily 90 tablet 1    enalapril (VASOTEC) 10 MG tablet Take 1 tablet by mouth daily 90 tablet 1    traZODone (DESYREL) 50 MG tablet Take 1 tablet by mouth nightly 90 tablet 1    nitrofurantoin, macrocrystal-monohydrate, (MACROBID) 100 MG capsule Take 1 capsule by mouth 2 times daily 20 capsule 0    alendronate (FOSAMAX) 70 MG tablet Take 1 tablet by mouth every 7 days Take with a full glass of water on an empty stomach at least 30 minutes before the first food, beverage, or medication. Stay upright for at least 30 minutes and until after first food of the day. Do not crush or break.  12 tablet 1    omeprazole (PRILOSEC) 20 MG delayed release capsule Take 1 capsule by mouth Daily 90 capsule 1    donepezil (ARICEPT) 10 MG tablet Take 1 tablet by mouth nightly 90 tablet 3    Cholecalciferol (VITAMIN D3) 5000 units CAPS Take 1 capsule by mouth daily 90 capsule 3    memantine (NAMENDA) 10 MG tablet Take 1/2 tablet daily x 7 days,then 1/2 tablet 2x daily x 7 days, then 1/2 tab in AM & 1tab @ dinner x7 days then 1 tab 2x daily thereafter      Handicap Placard MISC by Does not apply route DX: Dementia (F03.90), Type 2 Diabetes (E11.8), Atrial Fibrillation (I48.91)     EXPIRES: 05/2023 1 each 0    warfarin (COUMADIN) 2.5 MG tablet Take 2.5 mg by mouth daily Take daily as directed per physician based on INR results      docusate sodium (STOOL SOFTENER) 100 MG capsule Take 100 mg by mouth daily       Psyllium ([employer] NATURAL FIBER SUPPLEMENT PO) Take 1 tablet by mouth daily      ferrous sulfate (FE TABS) 325 (65 Fe) MG EC tablet Take 1 tablet by mouth daily (with breakfast) 90 tablet 1    acetaminophen (APAP EXTRA STRENGTH) 500 MG tablet Take 2 tablets by mouth 3 times daily as needed for Pain 60 tablet 3    Blood Glucose Monitoring Suppl TROY Use TID 1 Device 0    Glucose Blood (BLOOD GLUCOSE TEST STRIPS) STRP Use  strip 11    Lancets MISC 1 each by Does not apply route 3 times daily 100 each 11    Blood Glucose Monitoring Suppl (FREESTYLE FREEDOM) KIT Test once daily. DX: E11.8 1 kit 0    sotalol (BETAPACE) 80 MG tablet Take 1 tablet by mouth 2 times daily (Patient taking differently: Take 80 mg by mouth 2 times daily Take 1 tablet twice daily) 180 tablet 1    Calcium Carbonate-Vitamin D (CALCIUM + D PO) Take 1 tablet by mouth daily      Compression Stockings MISC by Does not apply route. Knee high 1 each 0    atenolol (TENORMIN) 50 MG tablet Take 50 mg by mouth daily Take 1 tablet daily      atorvastatin (LIPITOR) 10 MG tablet Take 10 mg by mouth daily At bedtime      aspirin 81 MG tablet Take 81 mg by mouth daily. No current facility-administered medications on file prior to visit.         Review of Systems   Unable to perform ROS: Dementia   Constitutional: Positive for appetite change and fatigue. Negative for chills, fever and unexpected weight change. HENT: Negative for congestion, mouth sores, sinus pain, sore throat and trouble swallowing. Respiratory: Negative for cough, chest tightness, shortness of breath and wheezing. Cardiovascular: Negative for chest pain, palpitations and leg swelling. Gastrointestinal: Negative for abdominal pain, constipation, diarrhea, nausea and vomiting. Endocrine: Negative for polydipsia, polyphagia and polyuria. Genitourinary: Negative for dysuria, flank pain, frequency and urgency. Musculoskeletal: Positive for gait problem. Negative for arthralgias, back pain, joint swelling and myalgias. Skin: Negative. Neurological: Positive for weakness. Negative for tremors, seizures, speech difficulty, numbness and headaches. Psychiatric/Behavioral: Negative for agitation, confusion, sleep disturbance and suicidal ideas. The patient is not nervous/anxious. Objective:   BP (!) 104/53   Pulse 66   Temp 97.2 °F (36.2 °C)   Wt 139 lb 12.8 oz (63.4 kg)   SpO2 96%   BMI 28.24 kg/m²    Wt Readings from Last 3 Encounters:   09/11/20 139 lb 12.8 oz (63.4 kg)   08/10/20 141 lb 12.8 oz (64.3 kg)   07/09/20 143 lb 9.6 oz (65.1 kg)     Physical Exam  Constitutional:       Appearance: She is well-developed. HENT:      Head: Normocephalic and atraumatic. Eyes:      Pupils: Pupils are equal, round, and reactive to light. Neck:      Musculoskeletal: Normal range of motion and neck supple. Cardiovascular:      Rate and Rhythm: Normal rate and regular rhythm. Heart sounds: No murmur. No friction rub. No gallop. Pulmonary:      Effort: Pulmonary effort is normal.      Breath sounds: Normal breath sounds. No wheezing or rales. Chest:      Chest wall: No tenderness. Abdominal:      General: Bowel sounds are normal. There is no distension. Palpations: Abdomen is soft. Tenderness:  There is no abdominal Paloma Jo - NP    Collaborating Physician : Dr. Justin Sandhu

## 2020-09-14 ASSESSMENT — ENCOUNTER SYMPTOMS
TROUBLE SWALLOWING: 0
WHEEZING: 0
VOMITING: 0
CONSTIPATION: 0
ABDOMINAL PAIN: 0
SINUS PAIN: 0
CHEST TIGHTNESS: 0
SORE THROAT: 0
COUGH: 0
NAUSEA: 0
DIARRHEA: 0
SHORTNESS OF BREATH: 0
BACK PAIN: 0

## 2020-10-05 ENCOUNTER — OFFICE VISIT (OUTPATIENT)
Dept: PALLATIVE CARE | Age: 85
End: 2020-10-05
Payer: MEDICARE

## 2020-10-05 VITALS
WEIGHT: 133.6 LBS | OXYGEN SATURATION: 95 % | BODY MASS INDEX: 26.98 KG/M2 | DIASTOLIC BLOOD PRESSURE: 64 MMHG | HEART RATE: 60 BPM | TEMPERATURE: 98.2 F | SYSTOLIC BLOOD PRESSURE: 112 MMHG

## 2020-10-05 PROCEDURE — 1090F PRES/ABSN URINE INCON ASSESS: CPT | Performed by: NURSE PRACTITIONER

## 2020-10-05 PROCEDURE — 99348 HOME/RES VST EST LOW MDM 30: CPT | Performed by: NURSE PRACTITIONER

## 2020-10-05 PROCEDURE — 4040F PNEUMOC VAC/ADMIN/RCVD: CPT | Performed by: NURSE PRACTITIONER

## 2020-10-05 PROCEDURE — 1036F TOBACCO NON-USER: CPT | Performed by: NURSE PRACTITIONER

## 2020-10-05 PROCEDURE — G8417 CALC BMI ABV UP PARAM F/U: HCPCS | Performed by: NURSE PRACTITIONER

## 2020-10-05 PROCEDURE — 1123F ACP DISCUSS/DSCN MKR DOCD: CPT | Performed by: NURSE PRACTITIONER

## 2020-10-05 PROCEDURE — G8484 FLU IMMUNIZE NO ADMIN: HCPCS | Performed by: NURSE PRACTITIONER

## 2020-10-05 RX ORDER — MIRTAZAPINE 15 MG/1
15 TABLET, FILM COATED ORAL NIGHTLY
Qty: 90 TABLET | Refills: 1 | Status: SHIPPED | OUTPATIENT
Start: 2020-10-05 | End: 2021-01-01

## 2020-10-05 NOTE — PROGRESS NOTES
Subjective:     Patient was seen at home in [place] for symptom management and goals of care discussion. Patient was accompanied by : Leah-private caregiver. Chief Complaint   Patient presents with    Anorexia        HPI      Katja Maldonado is an 80year old female whom was seen today for :dementia, debility, weight loss, gait instability, CKD,DM2, episodes of incontinence, frequent UTI's. Patient was seen at home alert but confused at baseline. She had a fall about 9 days ago where she got a few stitches on her right arm. Area is negative for any s/s of infection. She had another fall the day after the initial fall with no apparent injury. Per caregiver , patient has not been eating well in the past few weeks. She has lost about 6 lbs in the last month. Current weight is 133.6 lbs. Does drink about 2 bottles of nutritional supplement per day- non diabetic ones. Patient unable to reports any flank pain. Per caregiver she continues to be continent of bladder. Urine is clear and yellow as per caregiver. Using walker and caregiver stands behind patient for safety. She much weaker than before and very unstable. She has finished atb for UTI about 2 weeks ago. BM daily. No N/V. Positive for diarrhea x1 today. Sleeps well.      Past Medical History:   Diagnosis Date    Atrial fibrillation ([address]     CKD (chronic kidney disease) stage 3, GFR 30-59 ml/min ([address] 5/15/2018    DDD (degenerative disc disease), lumbar 5/21/2018    Dementia with behavioral disturbance ([address] 12/9/2015    Depression     Diverticular disease 2004    ct pelvis    DJD (degenerative joint disease) of knee     both    GERD (gastroesophageal reflux disease)     GERD (gastroesophageal reflux disease) 5/15/2018    Hernia, hiatal     egd    History of cancer of left breast 4/27/2018 2010 s/p left mastectomy    HTN (hypertension) 6/27/2013    Hyperlipidemia     Insomnia     Malignant neoplasm of female breast ([address] 2010    left breast s/p mastectomy    Osteopenia 2003    Pancreas cyst 2007    Scoliosis 5/21/2018    Type 2 diabetes mellitus with complication, without long-term current use of insulin ([facility] 6/27/2013     Past Surgical History:   Procedure Laterality Date    APPENDECTOMY  1974    BLEPHAROPLASTY Bilateral 2011    cosmetic    CATARACT REMOVAL Left     OU    COLONOSCOPY  2006    ENDOSCOPY, COLON, DIAGNOSTIC      HYSTERECTOMY  1974    KNEE ARTHROSCOPY Left 04/2007    left    LUMBAR LAMINECTOMY  1994    with disc surgery    MASTECTOMY Left 2010    left    OVARY REMOVAL Right 1974    only right    MN HEMORRHOIDECTOMY,INT/EXT,1 COLUMN/GROUP N/A 2/1/2018    HEMORRHOIDECTOMY performed by Raymundo Buitrago MD at [place] [id number] Right 2002    right    ROTATOR CUFF REPAIR Left 2003    left    TOE AMPUTATION Left 1969    1st and 2nd toes distal tips d/t  accident   [address]     Social History     Socioeconomic History    Marital status:       Spouse name: Not on file    Number of children: Not on file    Years of education: Not on file    Highest education level: Not on file   Occupational History    Occupation: Retired   Social Needs    Financial resource strain: Not on file    Food insecurity     Worry: Not on file     Inability: Not on file   [employer] needs     Medical: Not on file     Non-medical: Not on file   Tobacco Use    Smoking status: Never Smoker    Smokeless tobacco: Never Used   Substance and Sexual Activity    Alcohol use: No    Drug use: No    Sexual activity: Not Currently   Lifestyle    Physical activity     Days per week: Not on file     Minutes per session: Not on file    Stress: Not on file   Relationships    Social connections     Talks on phone: Not on file     Gets together: Not on file     Attends Scientologist service: Not on file     Active member of club or organization: Not on file     Attends meetings of clubs or organizations: Not on file     Relationship status: Not on file    Intimate partner violence     Fear of current or ex partner: Not on file     Emotionally abused: Not on file     Physically abused: Not on file     Forced sexual activity: Not on file   Other Topics Concern    Not on file   Social History Narrative    Lives with a caregiver, son is POA and lives in [facility]. Son checks on her in the evenings. 1 cat    1 dog     Family History   Problem Relation Age of Onset    Heart Disease Mother         enlarged heart    Cancer Father         bone    No Known Problems Sister      No Known Allergies  Current Outpatient Medications on File Prior to Visit   Medication Sig Dispense Refill    glipiZIDE (GLUCOTROL) 10 MG tablet Take 1 tablet by mouth 2 times daily (before meals) 180 tablet 1    amLODIPine (NORVASC) 5 MG tablet Take 1 tablet by mouth daily 90 tablet 1    mirtazapine (REMERON) 7.5 MG tablet TAKE 1 TABLET BY MOUTH NIGHTLY 30 tablet 3    sertraline (ZOLOFT) 50 MG tablet Take 1 tablet by mouth daily 90 tablet 1    enalapril (VASOTEC) 10 MG tablet Take 1 tablet by mouth daily 90 tablet 1    traZODone (DESYREL) 50 MG tablet Take 1 tablet by mouth nightly 90 tablet 1    nitrofurantoin, macrocrystal-monohydrate, (MACROBID) 100 MG capsule Take 1 capsule by mouth 2 times daily 20 capsule 0    alendronate (FOSAMAX) 70 MG tablet Take 1 tablet by mouth every 7 days Take with a full glass of water on an empty stomach at least 30 minutes before the first food, beverage, or medication. Stay upright for at least 30 minutes and until after first food of the day. Do not crush or break.  12 tablet 1    omeprazole (PRILOSEC) 20 MG delayed release capsule Take 1 capsule by mouth Daily 90 capsule 1    donepezil (ARICEPT) 10 MG tablet Take 1 tablet by mouth nightly 90 tablet 3    Cholecalciferol (VITAMIN D3) 5000 units CAPS Take 1 capsule by mouth daily 90 capsule 3    memantine (NAMENDA) 10 MG tablet Take 1/2 tablet daily x 7 days,then 1/2 tablet 2x daily x 7 days, then 1/2 tab in AM & 1tab @ dinner x7 days then 1 tab 2x daily thereafter      Handicap Placard MISC by Does not apply route DX: Dementia (F03.90), Type 2 Diabetes (E11.8), Atrial Fibrillation (I48.91)     EXPIRES: 05/2023 1 each 0    warfarin (COUMADIN) 2.5 MG tablet Take 2.5 mg by mouth daily Take daily as directed per physician based on INR results      docusate sodium (STOOL SOFTENER) 100 MG capsule Take 100 mg by mouth daily       Psyllium ([employer] NATURAL FIBER SUPPLEMENT PO) Take 1 tablet by mouth daily      ferrous sulfate (FE TABS) 325 (65 Fe) MG EC tablet Take 1 tablet by mouth daily (with breakfast) 90 tablet 1    acetaminophen (APAP EXTRA STRENGTH) 500 MG tablet Take 2 tablets by mouth 3 times daily as needed for Pain 60 tablet 3    Blood Glucose Monitoring Suppl TROY Use TID 1 Device 0    Glucose Blood (BLOOD GLUCOSE TEST STRIPS) STRP Use  strip 11    Lancets MISC 1 each by Does not apply route 3 times daily 100 each 11    Blood Glucose Monitoring Suppl (FREESTYLE FREEDOM) KIT Test once daily. DX: E11.8 1 kit 0    sotalol (BETAPACE) 80 MG tablet Take 1 tablet by mouth 2 times daily (Patient taking differently: Take 80 mg by mouth 2 times daily Take 1 tablet twice daily) 180 tablet 1    Calcium Carbonate-Vitamin D (CALCIUM + D PO) Take 1 tablet by mouth daily      Compression Stockings MISC by Does not apply route. Knee high 1 each 0    atenolol (TENORMIN) 50 MG tablet Take 50 mg by mouth daily Take 1 tablet daily      atorvastatin (LIPITOR) 10 MG tablet Take 10 mg by mouth daily At bedtime      aspirin 81 MG tablet Take 81 mg by mouth daily. No current facility-administered medications on file prior to visit. Review of Systems   Unable to perform ROS: Dementia   Constitutional: Positive for appetite change.        Objective:   /64   Pulse 60   Temp 98.2 °F (36.8 °C)   Wt 133 lb 9.6 oz (60.6 kg)   SpO2 95%   BMI 26.98 kg/m²    Wt Readings from Last 3 Encounters:   10/05/20 133 lb 9.6 oz (60.6 kg)   09/11/20 139 lb 12.8 oz (63.4 kg)   08/10/20 141 lb 12.8 oz (64.3 kg)     Physical Exam  Constitutional:       Appearance: She is well-developed. HENT:      Head: Normocephalic and atraumatic. Eyes:      Pupils: Pupils are equal, round, and reactive to light. Neck:      Musculoskeletal: Normal range of motion and neck supple. Cardiovascular:      Rate and Rhythm: Normal rate and regular rhythm. Heart sounds: No murmur. No friction rub. No gallop. Pulmonary:      Effort: Pulmonary effort is normal.      Breath sounds: Normal breath sounds. No wheezing or rales. Chest:      Chest wall: No tenderness. Abdominal:      General: Bowel sounds are normal. There is no distension. Palpations: Abdomen is soft. Tenderness: There is no abdominal tenderness. There is no guarding. Musculoskeletal: Normal range of motion. General: No tenderness or deformity. Skin:     General: Skin is warm and dry. Findings: No erythema or rash. Comments: Right arm stitches   Neurological:      Mental Status: She is alert. Mental status is at baseline. She is disoriented. Motor: Weakness present. Coordination: Coordination abnormal.      Gait: Gait abnormal.         Assessment and Plan:      1. Multiple falls  2. Unsteady gait  -no aggressive behaviors reported by caregiver  -frequent UTI's, frequent falls  -CBC, CMP, UA  -referral to [facility] -PT/OT/ST  3. Dementia with behavioral disturbance, unspecified dementia type ([facility] [address]  -on Aricept and namenda    4. Urinary tract infection without hematuria, site unspecified  -frequent UTI's  -has finished Cipro -atb therapy about 2 weeks ago  -will do CBC, CMP, UA  5.  Weight loss  - patient has not been eating well for the fast several day  -taking nutritional supplement about 2 bottles per day  -supplement is not diabetic friendly  - noted increased blood sugars 200-300's. -increase Remeron to 15 mg at HS  - monitor for swallowing difficulty.  -small frequent meals    6. Adult failure to thrive  - see POC above    7. Palliative care encounter  -will not consider LTC placement  -no hospice at this time  -plan to keep patient at home with private caregiver and keep patient comfortable  -will do work up and referral to [place] [id number]    There are no discontinued medications. Discussed with patient/surrogate: POC, Treatment risks/benefits, and alternatives including as noted above. All questions were answered. Gave much active listening, presence, and emotional support. Due to acuity, symptomatology and high-risk medication management,   I advised patient to follow up in 2 weeks. Total Time 45 mins   > 50% Time Spent Counseling/Care coordination?  yes     GAL Stover - NP    Collaborating Physician : Dr. Dru Meyer

## 2020-10-07 ENCOUNTER — TELEPHONE (OUTPATIENT)
Dept: FAMILY MEDICINE CLINIC | Age: 85
End: 2020-10-07

## 2020-10-07 NOTE — TELEPHONE ENCOUNTER
----- Message from Delmy Viveros sent at 10/7/2020  1:50 PM EDT -----  Regarding: Patient needing a Face to Face appt with Dr Yumiko Francois  The Pt's Placido Angle called to schedule a Face to Face appt with Dr Yumiko Francois, 1st Issue-Hosp ER F/UP-[facility]-Discharged-9/22/2020-The Pt has stitches in her arm that needs to be removed. 2nd Issue- The Pt has a Home Care Nurse who comes to visit her & believes the Pt has some type of an infection. She believes Lab Testing will be needed. Lorena Barrera can be reached at [phone].

## 2020-10-08 NOTE — TELEPHONE ENCOUNTER
Please help patient schedule a face-to face hospital F/U visit. If she was discharged 09/22/2020 and had sutures placed in arm she would be overdue for suture removal. Any stitches/sutures would need removal in 7-10 days if they are in the arm. I would suggest she come to walk-in clinic ASAP to have suture removal and then she could be scheduled with me or any other available provider if my schedule is full in the next week.

## 2020-10-09 ENCOUNTER — TELEMEDICINE (OUTPATIENT)
Dept: FAMILY MEDICINE CLINIC | Age: 85
End: 2020-10-09
Payer: MEDICARE

## 2020-10-09 ENCOUNTER — TELEPHONE (OUTPATIENT)
Dept: FAMILY MEDICINE CLINIC | Age: 85
End: 2020-10-09

## 2020-10-09 PROCEDURE — 99443 PR PHYS/QHP TELEPHONE EVALUATION 21-30 MIN: CPT | Performed by: FAMILY MEDICINE

## 2020-10-09 RX ORDER — MEMANTINE HYDROCHLORIDE 10 MG/1
10 TABLET ORAL 2 TIMES DAILY
COMMUNITY
Start: 2020-07-20 | End: 2021-01-01 | Stop reason: SDUPTHER

## 2020-10-09 ASSESSMENT — ENCOUNTER SYMPTOMS
DIARRHEA: 0
VOMITING: 0
CONSTIPATION: 0
COUGH: 0
WHEEZING: 0
ANAL BLEEDING: 0
SHORTNESS OF BREATH: 0
BLOOD IN STOOL: 0
NAUSEA: 0
CHEST TIGHTNESS: 0
BACK PAIN: 0
ABDOMINAL PAIN: 0

## 2020-10-09 NOTE — PROGRESS NOTES
Elida Giron is a 80 y.o. female evaluated via telephone on 10/9/2020. Consent:  She and/or health care decision maker is aware that that she may receive a bill for this telephone service, depending on her insurance coverage, and has provided verbal consent to proceed: Yes      Documentation:  I communicated with the patient and/or health care decision maker about recent ER visit. Details of this discussion including any medical advice provided:     Patient presents for virtual telephone visit for ER follow-up. History is provided by her caregiver. She was seen at [facility] on 09/22/2020 following a fall in her bedroom causing a right arm laceration that required suture repair. She was given Tdap vaccination, placed on antibiotics, and discharged home to follow-up with primary care. Per caregiver patient has been doing well since ER discharge. Her sutures were removed yesterday by home nursing and her laceration has healed well with no erythema, drainage, bleeding, or visible swelling. There is no reported F/C/S, H/A, chest pain, dyspnea, N/V, abdominal pain, diarrhea, dysuria, hematuria, or increased urinary frequency. Patient usually has BG values 90's to 250's, over the past week BG values have been 250's to 400's. There is no reported blurry vision, but patient has been drinking more fluids and reporting being more thirsty. There have been no reported paresthesias. Caregiver reports patient has been taking all of her medications, but she is unaware of patients complete medication list. She states patient's son manages medication and he was unable to be present for today's visit. Patient remains established with palliative care and home care has been ordered to start PT/OT and speech therapy. Labwork has been ordered by palliative care including CBC, CMP, and urinalysis to determine if there are signs of an underlying contributor to patient's elevated blood glucose.      Review of Systems Constitutional: Negative for appetite change, chills, diaphoresis, fatigue, fever and unexpected weight change. Eyes: Negative for visual disturbance. Respiratory: Negative for cough, chest tightness, shortness of breath and wheezing. Cardiovascular: Negative for chest pain, palpitations and leg swelling. No orthopnea, No PND   Gastrointestinal: Negative for abdominal pain, anal bleeding, blood in stool, constipation, diarrhea, nausea and vomiting. No heartburn, No melena   Endocrine: Positive for polydipsia. Negative for cold intolerance, heat intolerance, polyphagia and polyuria. Genitourinary: Negative for dysuria and hematuria. Musculoskeletal: Positive for gait problem (unsteady). Negative for back pain and myalgias. Skin: Negative for rash. Neurological: Negative for dizziness, syncope, weakness, light-headedness, numbness and headaches. Psychiatric/Behavioral: Negative for dysphoric mood and sleep disturbance. The patient is not nervous/anxious. Amberly Edwards was seen today for follow-up from hospital.    Diagnoses and all orders for this visit:    Laceration of right upper extremity, initial encounter  Well healed per caregiver reporting. Patient finished course of antibiotics for management and has no reported signs of infection. Unsteady gait  Home care has been ordered and patient will be starting PT/OT for management to help with gait stability and reducing the risk of future falls. Recurrent falls  See above. Labwork has been ordered to further evaluate for any underlying cause. Type 2 diabetes mellitus with complication, without long-term current use of insulin (HCC)  I will obtain A1c and review in addition to labs ordered by Palliative care to make medication adjustments and better manage blood glucose. No meds will be added until up-to-date med list is obtained from son.      -     Hemoglobin A1C; Future    Stage 3 chronic kidney disease, unspecified whether stage 3a or 3b CKD  Will follow labwork over time     KEEP F/U VISIT SCHEDULED DEC 2020    I affirm this is a Patient Initiated Episode with a Patient who has not had a related appointment within my department in the past 7 days or scheduled within the next 24 hours.     Patient identification was verified at the start of the visit: Yes    Total Time: minutes: 21-30 minutes     Patient location: Individual Home  Provider location: Individual Home      Note: not billable if this call serves to triage the patient into an appointment for the relevant concern      JONAS Crawford

## 2020-10-10 NOTE — TELEPHONE ENCOUNTER
Please help patient and son make arrangements to have bloodwork ordered by palliative care and myself along with urine testing obtained at home. Also please review med list with son and make sure our med list is up-to-date. Please find out from him the name of the antibiotic that patient was given by [facility] ER after her arm laceration. Once med list is updated send message back to me. I will use current med list and labs to determine how best to adjust medications for better blood glucose control.

## 2020-10-12 NOTE — TELEPHONE ENCOUNTER
I spoke with patient son and went over the med list with him and updated patient chart. He is unsure if she is taking the amlodipine 5 mg and the atenolol 50 mg (I marked as not taking but kept on her medication list). He believes the mirtazapine 15 nightly  was replaced by sertraline. I removed the stool softener from her list and he is unsure if the macrobid is the antibiotic that was given to her and will check when he sees her today around 2 then call me back at the office to let me know which antibiotic and which blood pressure medications she is taking. He also states patient BP this morning was 85/47 and her HR was 51. He states she has been feeling cold and clammy and not eating very well. Her blood sugar has been reading around 300s and switched her nutritional shakes to glycerna for better blood sugar management. He states she has been losing weight and she has been sup[plementing with these shakes but its hard to manage her sugar with these. He did mention that you spoke of treating her with insulin as well.

## 2020-10-13 ENCOUNTER — TELEPHONE (OUTPATIENT)
Dept: FAMILY MEDICINE CLINIC | Age: 85
End: 2020-10-13

## 2020-10-13 RX ORDER — INSULIN GLARGINE 100 [IU]/ML
10 INJECTION, SOLUTION SUBCUTANEOUS NIGHTLY
Qty: 5 PEN | Refills: 1 | Status: SHIPPED | OUTPATIENT
Start: 2020-10-13 | End: 2021-01-01

## 2020-10-13 RX ORDER — INSULIN GLARGINE 100 [IU]/ML
10 INJECTION, SOLUTION SUBCUTANEOUS NIGHTLY
Qty: 5 PEN | Refills: 1 | Status: SHIPPED | OUTPATIENT
Start: 2020-10-13 | End: 2020-10-13 | Stop reason: CLARIF

## 2020-10-13 RX ORDER — PEN NEEDLE, DIABETIC 31 GX5/16"
1 NEEDLE, DISPOSABLE MISCELLANEOUS DAILY
Qty: 100 EACH | Refills: 5 | Status: SHIPPED | OUTPATIENT
Start: 2020-10-13

## 2020-10-13 NOTE — TELEPHONE ENCOUNTER
Rx for basaglar 10 units at bedtime sent to local pharmacy to replace lantus which was too expensive. Please inform son and instruct him to  this new rx.

## 2020-10-13 NOTE — TELEPHONE ENCOUNTER
1ST attempt to reach patient. Called patient @ [phone]    and left message on machine for patient to return call during normal business hours of 8:30 AM and 5 PM @ [phone] option 2.

## 2020-10-13 NOTE — TELEPHONE ENCOUNTER
Spoke with patient son he reports she is taking Amlodipine 5 mg and Enalapril.  He reports the Latnus 10 units cost 590 dollars at the pharmacy

## 2020-10-13 NOTE — TELEPHONE ENCOUNTER
See previous message from Drea Llanes. Please clarify blood pressure medications and send back to me with update. Based on reported med list to this point I would like patient to start lantus 10 units subcutaneous at bedtime. Family should be checking BG at least once daily, alternating between before breakfast, before lunch, and before dinner. Please instruct them to record blood glucose values on paper and call family in 2 weeks to get updated BG readings once patient has started lantus in addition to the rest of her medication. 16

## 2020-10-15 ENCOUNTER — HOSPITAL ENCOUNTER (OUTPATIENT)
Age: 85
Setting detail: SPECIMEN
Discharge: HOME | End: 2020-10-15
Payer: MEDICARE

## 2020-10-15 LAB
ALBUMIN SERPL-MCNC: 3.8 G/DL (ref 3.5–4.6)
ALP BLD-CCNC: 60 U/L (ref 40–130)
ALT SERPL-CCNC: 7 U/L (ref 0–33)
ANION GAP SERPL CALCULATED.3IONS-SCNC: 15 MEQ/L (ref 9–15)
AST SERPL-CCNC: 19 U/L (ref 0–35)
BACTERIA: ABNORMAL /HPF
BASOPHILS ABSOLUTE: 0.1 K/UL (ref 0–0.2)
BASOPHILS RELATIVE PERCENT: 0.8 %
BILIRUB SERPL-MCNC: <0.2 MG/DL (ref 0.2–0.7)
BILIRUBIN URINE: NEGATIVE
BLOOD, URINE: NEGATIVE
BUN BLDV-MCNC: 39 MG/DL (ref 8–23)
CALCIUM SERPL-MCNC: 10 MG/DL (ref 8.5–9.9)
CHLORIDE BLD-SCNC: 99 MEQ/L (ref 95–107)
CLARITY: CLEAR
CO2: 25 MEQ/L (ref 20–31)
COLOR: YELLOW
CREAT SERPL-MCNC: 1.48 MG/DL (ref 0.5–0.9)
EOSINOPHILS ABSOLUTE: 0.2 K/UL (ref 0–0.7)
EOSINOPHILS RELATIVE PERCENT: 2.2 %
EPITHELIAL CELLS, UA: ABNORMAL /HPF (ref 0–5)
GFR AFRICAN AMERICAN: 40.5
GFR NON-AFRICAN AMERICAN: 33.4
GLOBULIN: 4.2 G/DL (ref 2.3–3.5)
GLUCOSE BLD-MCNC: 145 MG/DL (ref 70–99)
GLUCOSE URINE: NEGATIVE MG/DL
HBA1C MFR BLD: 8.5 % (ref 4.8–5.9)
HCT VFR BLD CALC: 34.5 % (ref 37–47)
HEMOGLOBIN: 11.3 G/DL (ref 12–16)
HYALINE CASTS: ABNORMAL /HPF (ref 0–5)
KETONES, URINE: NEGATIVE MG/DL
LEUKOCYTE ESTERASE, URINE: ABNORMAL
LYMPHOCYTES ABSOLUTE: 3.2 K/UL (ref 1–4.8)
LYMPHOCYTES RELATIVE PERCENT: 37.9 %
MCH RBC QN AUTO: 26.8 PG (ref 27–31.3)
MCHC RBC AUTO-ENTMCNC: 32.7 % (ref 33–37)
MCV RBC AUTO: 82 FL (ref 82–100)
MONOCYTES ABSOLUTE: 0.6 K/UL (ref 0.2–0.8)
MONOCYTES RELATIVE PERCENT: 7 %
NEUTROPHILS ABSOLUTE: 4.4 K/UL (ref 1.4–6.5)
NEUTROPHILS RELATIVE PERCENT: 52.1 %
NITRITE, URINE: NEGATIVE
PDW BLD-RTO: 15.2 % (ref 11.5–14.5)
PH UA: 5 (ref 5–9)
PLATELET # BLD: 391 K/UL (ref 130–400)
POTASSIUM SERPL-SCNC: 4.6 MEQ/L (ref 3.4–4.9)
PROTEIN UA: NEGATIVE MG/DL
RBC # BLD: 4.21 M/UL (ref 4.2–5.4)
RBC UA: ABNORMAL /HPF (ref 0–5)
SODIUM BLD-SCNC: 139 MEQ/L (ref 135–144)
SPECIFIC GRAVITY UA: 1.02 (ref 1–1.03)
TOTAL PROTEIN: 8 G/DL (ref 6.3–8)
UROBILINOGEN, URINE: 0.2 E.U./DL
WBC # BLD: 8.4 K/UL (ref 4.8–10.8)
WBC UA: ABNORMAL /HPF (ref 0–5)

## 2020-10-15 PROCEDURE — 85025 COMPLETE CBC W/AUTO DIFF WBC: CPT

## 2020-10-15 PROCEDURE — 81001 URINALYSIS AUTO W/SCOPE: CPT

## 2020-10-15 PROCEDURE — 83036 HEMOGLOBIN GLYCOSYLATED A1C: CPT

## 2020-10-15 PROCEDURE — 80053 COMPREHEN METABOLIC PANEL: CPT

## 2020-10-16 ENCOUNTER — TELEPHONE (OUTPATIENT)
Dept: PALLATIVE CARE | Age: 85
End: 2020-10-16

## 2020-10-16 RX ORDER — CIPROFLOXACIN 500 MG/1
500 TABLET, FILM COATED ORAL 2 TIMES DAILY
Qty: 14 TABLET | Refills: 0 | Status: SHIPPED | OUTPATIENT
Start: 2020-10-16 | End: 2020-10-23

## 2020-10-16 NOTE — TELEPHONE ENCOUNTER
Received lab result. Positive for UTI. Will call in atb for patient. Spoke to son and made him aware that frequent monitoring of PT/INR will need to be done while on atb therapy. Please reports results of Pt/INR to PCP

## 2020-10-26 ENCOUNTER — OFFICE VISIT (OUTPATIENT)
Dept: PALLATIVE CARE | Age: 85
End: 2020-10-26
Payer: MEDICARE

## 2020-10-26 VITALS
BODY MASS INDEX: 27.71 KG/M2 | HEART RATE: 64 BPM | TEMPERATURE: 97.5 F | DIASTOLIC BLOOD PRESSURE: 53 MMHG | WEIGHT: 137.2 LBS | SYSTOLIC BLOOD PRESSURE: 94 MMHG | OXYGEN SATURATION: 97 %

## 2020-10-26 PROCEDURE — 1090F PRES/ABSN URINE INCON ASSESS: CPT | Performed by: NURSE PRACTITIONER

## 2020-10-26 PROCEDURE — G8484 FLU IMMUNIZE NO ADMIN: HCPCS | Performed by: NURSE PRACTITIONER

## 2020-10-26 PROCEDURE — 1036F TOBACCO NON-USER: CPT | Performed by: NURSE PRACTITIONER

## 2020-10-26 PROCEDURE — 99348 HOME/RES VST EST LOW MDM 30: CPT | Performed by: NURSE PRACTITIONER

## 2020-10-26 PROCEDURE — G8417 CALC BMI ABV UP PARAM F/U: HCPCS | Performed by: NURSE PRACTITIONER

## 2020-10-26 PROCEDURE — 4040F PNEUMOC VAC/ADMIN/RCVD: CPT | Performed by: NURSE PRACTITIONER

## 2020-10-26 PROCEDURE — 1123F ACP DISCUSS/DSCN MKR DOCD: CPT | Performed by: NURSE PRACTITIONER

## 2020-10-26 ASSESSMENT — ENCOUNTER SYMPTOMS
CHEST TIGHTNESS: 0
COUGH: 0
ABDOMINAL PAIN: 0
WHEEZING: 0
BACK PAIN: 0
TROUBLE SWALLOWING: 0
SHORTNESS OF BREATH: 0
CONSTIPATION: 0
SINUS PAIN: 0
VOMITING: 0
NAUSEA: 0
SORE THROAT: 0
DIARRHEA: 0

## 2020-10-26 NOTE — PROGRESS NOTES
violence     Fear of current or ex partner: Not on file     Emotionally abused: Not on file     Physically abused: Not on file     Forced sexual activity: Not on file   Other Topics Concern    Not on file   Social History Narrative    Lives with a caregiver, son is POA and lives in [facility]. Son checks on her in the evenings. 1 cat    1 dog     Family History   Problem Relation Age of Onset    Heart Disease Mother         enlarged heart    Cancer Father         bone    No Known Problems Sister      No Known Allergies  Current Outpatient Medications on File Prior to Visit   Medication Sig Dispense Refill    Insulin Pen Needle (B-D ULTRAFINE III SHORT PEN) 31G X 8 MM MISC 1 each by Does not apply route daily 100 each 5    insulin glargine (BASAGLAR KWIKPEN) 100 UNIT/ML injection pen Inject 10 Units into the skin nightly 5 pen 1    memantine (NAMENDA) 10 MG tablet Take 10 mg by mouth 2 times daily      mirtazapine (REMERON) 15 MG tablet Take 1 tablet by mouth nightly (Patient not taking: Reported on 10/12/2020) 90 tablet 1    glipiZIDE (GLUCOTROL) 10 MG tablet Take 1 tablet by mouth 2 times daily (before meals) 180 tablet 1    amLODIPine (NORVASC) 5 MG tablet Take 1 tablet by mouth daily (Patient not taking: Reported on 10/12/2020) 90 tablet 1    sertraline (ZOLOFT) 50 MG tablet Take 1 tablet by mouth daily 90 tablet 1    enalapril (VASOTEC) 10 MG tablet Take 1 tablet by mouth daily 90 tablet 1    traZODone (DESYREL) 50 MG tablet Take 1 tablet by mouth nightly 90 tablet 1    nitrofurantoin, macrocrystal-monohydrate, (MACROBID) 100 MG capsule Take 1 capsule by mouth 2 times daily 20 capsule 0    alendronate (FOSAMAX) 70 MG tablet Take 1 tablet by mouth every 7 days Take with a full glass of water on an empty stomach at least 30 minutes before the first food, beverage, or medication. Stay upright for at least 30 minutes and until after first food of the day. Do not crush or break.  12 tablet 1    omeprazole (PRILOSEC) 20 MG delayed release capsule Take 1 capsule by mouth Daily 90 capsule 1    donepezil (ARICEPT) 10 MG tablet Take 1 tablet by mouth nightly 90 tablet 3    Cholecalciferol (VITAMIN D3) 5000 units CAPS Take 1 capsule by mouth daily (Patient taking differently: Take 2,000 Units by mouth daily ) 90 capsule 3    Handicap Placard MISC by Does not apply route DX: Dementia (F03.90), Type 2 Diabetes (E11.8), Atrial Fibrillation (I48.91)     EXPIRES: 05/2023 1 each 0    warfarin (COUMADIN) 2.5 MG tablet Take 2.5 mg by mouth daily Take daily as directed per physician based on INR results      Psyllium ([employer] NATURAL FIBER SUPPLEMENT PO) Take 1 tablet by mouth 2 times daily       ferrous sulfate (FE TABS) 325 (65 Fe) MG EC tablet Take 1 tablet by mouth daily (with breakfast) 90 tablet 1    acetaminophen (APAP EXTRA STRENGTH) 500 MG tablet Take 2 tablets by mouth 3 times daily as needed for Pain 60 tablet 3    Blood Glucose Monitoring Suppl TROY Use TID 1 Device 0    Glucose Blood (BLOOD GLUCOSE TEST STRIPS) STRP Use  strip 11    Lancets MISC 1 each by Does not apply route 3 times daily 100 each 11    Blood Glucose Monitoring Suppl (FREESTYLE FREEDOM) KIT Test once daily. DX: E11.8 1 kit 0    sotalol (BETAPACE) 80 MG tablet Take 1 tablet by mouth 2 times daily 180 tablet 1    Calcium Carbonate-Vitamin D (CALCIUM + D PO) Take 1 tablet by mouth daily      Compression Stockings MISC by Does not apply route. Knee high 1 each 0    atenolol (TENORMIN) 50 MG tablet Take 50 mg by mouth daily Take 1 tablet daily      atorvastatin (LIPITOR) 10 MG tablet Take 10 mg by mouth daily At bedtime      aspirin 81 MG tablet Take 81 mg by mouth daily. No current facility-administered medications on file prior to visit. Review of Systems   Unable to perform ROS: Dementia   Constitutional: Positive for appetite change. Negative for chills, fatigue, fever and unexpected weight change.    HENT: Negative for congestion, mouth sores, sinus pain, sore throat and trouble swallowing. Respiratory: Negative for cough, chest tightness, shortness of breath and wheezing. Cardiovascular: Negative for chest pain, palpitations and leg swelling. Gastrointestinal: Negative for abdominal pain, constipation, diarrhea, nausea and vomiting. Endocrine: Negative for polydipsia, polyphagia and polyuria. Genitourinary: Negative for dysuria, flank pain, frequency and urgency. Musculoskeletal: Negative for arthralgias, back pain, gait problem, joint swelling and myalgias. Skin: Negative. Neurological: Negative for tremors, seizures, speech difficulty, weakness, numbness and headaches. Psychiatric/Behavioral: Negative for agitation, confusion, sleep disturbance and suicidal ideas. The patient is not nervous/anxious. Objective:   BP (!) 94/53   Pulse 64   Temp 97.5 °F (36.4 °C)   Wt 137 lb 3.2 oz (62.2 kg)   SpO2 97%   BMI 27.71 kg/m²    Wt Readings from Last 3 Encounters:   10/26/20 137 lb 3.2 oz (62.2 kg)   10/05/20 133 lb 9.6 oz (60.6 kg)   09/11/20 139 lb 12.8 oz (63.4 kg)     Physical Exam  Constitutional:       Appearance: She is well-developed. HENT:      Head: Normocephalic and atraumatic. Eyes:      Pupils: Pupils are equal, round, and reactive to light. Neck:      Musculoskeletal: Normal range of motion and neck supple. Cardiovascular:      Rate and Rhythm: Normal rate and regular rhythm. Heart sounds: No murmur. No friction rub. No gallop. Pulmonary:      Effort: Pulmonary effort is normal.      Breath sounds: Normal breath sounds. No wheezing or rales. Chest:      Chest wall: No tenderness. Abdominal:      General: Bowel sounds are normal. There is no distension. Palpations: Abdomen is soft. Tenderness: There is no abdominal tenderness. There is no guarding. Musculoskeletal: Normal range of motion. General: No tenderness or deformity.    Skin:

## 2020-11-10 ENCOUNTER — TELEPHONE (OUTPATIENT)
Dept: PALLATIVE CARE | Age: 85
End: 2020-11-10

## 2020-11-10 RX ORDER — DOXYCYCLINE HYCLATE 100 MG/1
100 CAPSULE ORAL 2 TIMES DAILY
Qty: 20 CAPSULE | Refills: 0 | Status: SHIPPED | OUTPATIENT
Start: 2020-11-10 | End: 2020-11-20

## 2020-11-12 RX ORDER — GREEN TEA/HOODIA GORDONII 315-12.5MG
1 CAPSULE ORAL 2 TIMES DAILY
Qty: 60 TABLET | Refills: 0 | Status: SHIPPED | OUTPATIENT
Start: 2020-11-12 | End: 2020-12-12

## 2021-01-01 ENCOUNTER — CLINICAL DOCUMENTATION (OUTPATIENT)
Dept: PALLATIVE CARE | Age: 86
End: 2021-01-01

## 2021-01-01 ENCOUNTER — TELEPHONE (OUTPATIENT)
Dept: FAMILY MEDICINE CLINIC | Age: 86
End: 2021-01-01

## 2021-01-01 ENCOUNTER — OFFICE VISIT (OUTPATIENT)
Dept: PALLATIVE CARE | Age: 86
End: 2021-01-01
Payer: MEDICARE

## 2021-01-01 ENCOUNTER — TELEPHONE (OUTPATIENT)
Dept: PALLATIVE CARE | Age: 86
End: 2021-01-01

## 2021-01-01 ENCOUNTER — HOSPITAL ENCOUNTER (OUTPATIENT)
Age: 86
Setting detail: SPECIMEN
Discharge: HOME | End: 2021-11-17
Payer: MEDICARE

## 2021-01-01 ENCOUNTER — VIRTUAL VISIT (OUTPATIENT)
Dept: FAMILY MEDICINE CLINIC | Age: 86
End: 2021-01-01
Payer: MEDICARE

## 2021-01-01 ENCOUNTER — HOSPITAL ENCOUNTER (OUTPATIENT)
Age: 86
Setting detail: SPECIMEN
Discharge: HOME | End: 2021-11-30
Payer: MEDICARE

## 2021-01-01 ENCOUNTER — HOSPITAL ENCOUNTER (OUTPATIENT)
Age: 86
Setting detail: SPECIMEN
Discharge: HOME | End: 2021-12-01
Payer: MEDICARE

## 2021-01-01 ENCOUNTER — OFFICE VISIT (OUTPATIENT)
Dept: FAMILY MEDICINE CLINIC | Age: 86
End: 2021-01-01
Payer: MEDICARE

## 2021-01-01 VITALS
DIASTOLIC BLOOD PRESSURE: 68 MMHG | SYSTOLIC BLOOD PRESSURE: 110 MMHG | RESPIRATION RATE: 18 BRPM | OXYGEN SATURATION: 96 % | HEART RATE: 60 BPM

## 2021-01-01 VITALS
WEIGHT: 123 LBS | SYSTOLIC BLOOD PRESSURE: 132 MMHG | HEART RATE: 66 BPM | HEIGHT: 60 IN | DIASTOLIC BLOOD PRESSURE: 74 MMHG | BODY MASS INDEX: 24.15 KG/M2 | OXYGEN SATURATION: 97 %

## 2021-01-01 VITALS
OXYGEN SATURATION: 96 % | BODY MASS INDEX: 27.06 KG/M2 | SYSTOLIC BLOOD PRESSURE: 133 MMHG | RESPIRATION RATE: 18 BRPM | HEART RATE: 70 BPM | DIASTOLIC BLOOD PRESSURE: 68 MMHG | WEIGHT: 134 LBS

## 2021-01-01 DIAGNOSIS — E55.9 VITAMIN D DEFICIENCY: ICD-10-CM

## 2021-01-01 DIAGNOSIS — N39.0 FREQUENT UTI: ICD-10-CM

## 2021-01-01 DIAGNOSIS — Z51.5 PALLIATIVE CARE ENCOUNTER: ICD-10-CM

## 2021-01-01 DIAGNOSIS — G89.29 CHRONIC DENTAL PAIN: Primary | ICD-10-CM

## 2021-01-01 DIAGNOSIS — F03.90 DEMENTIA WITHOUT BEHAVIORAL DISTURBANCE, UNSPECIFIED DEMENTIA TYPE: ICD-10-CM

## 2021-01-01 DIAGNOSIS — F03.91 DEMENTIA WITH BEHAVIORAL DISTURBANCE, UNSPECIFIED DEMENTIA TYPE: ICD-10-CM

## 2021-01-01 DIAGNOSIS — I48.91 ATRIAL FIBRILLATION, UNSPECIFIED TYPE (HCC): Chronic | ICD-10-CM

## 2021-01-01 DIAGNOSIS — Z00.00 ROUTINE GENERAL MEDICAL EXAMINATION AT A HEALTH CARE FACILITY: Primary | ICD-10-CM

## 2021-01-01 DIAGNOSIS — F03.91 DEMENTIA WITH BEHAVIORAL DISTURBANCE, UNSPECIFIED DEMENTIA TYPE: Primary | ICD-10-CM

## 2021-01-01 DIAGNOSIS — Z51.5 PALLIATIVE CARE BY SPECIALIST: ICD-10-CM

## 2021-01-01 DIAGNOSIS — E11.8 TYPE 2 DIABETES MELLITUS WITH COMPLICATION, WITHOUT LONG-TERM CURRENT USE OF INSULIN (HCC): Primary | ICD-10-CM

## 2021-01-01 DIAGNOSIS — E78.5 HYPERLIPIDEMIA, UNSPECIFIED HYPERLIPIDEMIA TYPE: Chronic | ICD-10-CM

## 2021-01-01 DIAGNOSIS — L89.621 PRESSURE INJURY OF LEFT HEEL, STAGE 1: ICD-10-CM

## 2021-01-01 DIAGNOSIS — I10 ESSENTIAL HYPERTENSION: Chronic | ICD-10-CM

## 2021-01-01 DIAGNOSIS — K08.9 CHRONIC DENTAL PAIN: Primary | ICD-10-CM

## 2021-01-01 DIAGNOSIS — N18.32 STAGE 3B CHRONIC KIDNEY DISEASE (HCC): Chronic | ICD-10-CM

## 2021-01-01 DIAGNOSIS — Z51.5 PALLIATIVE CARE ENCOUNTER: Primary | ICD-10-CM

## 2021-01-01 DIAGNOSIS — R63.4 WEIGHT LOSS: Primary | ICD-10-CM

## 2021-01-01 DIAGNOSIS — Z13.31 POSITIVE DEPRESSION SCREENING: ICD-10-CM

## 2021-01-01 DIAGNOSIS — M81.0 OSTEOPOROSIS WITHOUT CURRENT PATHOLOGICAL FRACTURE, UNSPECIFIED OSTEOPOROSIS TYPE: ICD-10-CM

## 2021-01-01 DIAGNOSIS — N18.32 STAGE 3B CHRONIC KIDNEY DISEASE (HCC): ICD-10-CM

## 2021-01-01 DIAGNOSIS — I10 ESSENTIAL HYPERTENSION: ICD-10-CM

## 2021-01-01 DIAGNOSIS — E11.8 TYPE 2 DIABETES MELLITUS WITH COMPLICATION, WITHOUT LONG-TERM CURRENT USE OF INSULIN (HCC): ICD-10-CM

## 2021-01-01 DIAGNOSIS — R30.0 DYSURIA: Primary | ICD-10-CM

## 2021-01-01 DIAGNOSIS — F03.91 DEMENTIA WITH BEHAVIORAL DISTURBANCE, UNSPECIFIED DEMENTIA TYPE: Chronic | ICD-10-CM

## 2021-01-01 DIAGNOSIS — F32.A DEPRESSION, UNSPECIFIED DEPRESSION TYPE: Chronic | ICD-10-CM

## 2021-01-01 DIAGNOSIS — K21.9 GASTROESOPHAGEAL REFLUX DISEASE, UNSPECIFIED WHETHER ESOPHAGITIS PRESENT: Chronic | ICD-10-CM

## 2021-01-01 DIAGNOSIS — R63.0 ANOREXIA: ICD-10-CM

## 2021-01-01 DIAGNOSIS — R53.81 DECLINING FUNCTIONAL STATUS: ICD-10-CM

## 2021-01-01 DIAGNOSIS — I48.91 ATRIAL FIBRILLATION, UNSPECIFIED TYPE (HCC): ICD-10-CM

## 2021-01-01 DIAGNOSIS — R19.7 DIARRHEA, UNSPECIFIED TYPE: ICD-10-CM

## 2021-01-01 DIAGNOSIS — R53.81 DEBILITY: ICD-10-CM

## 2021-01-01 DIAGNOSIS — Z74.09 IMPAIRED MOBILITY: ICD-10-CM

## 2021-01-01 DIAGNOSIS — R30.0 DYSURIA: ICD-10-CM

## 2021-01-01 DIAGNOSIS — L89.153 PRESSURE INJURY OF SACRAL REGION, STAGE 3 (HCC): Primary | ICD-10-CM

## 2021-01-01 LAB
ALBUMIN SERPL-MCNC: 3.8 G/DL (ref 3.5–4.6)
ALP BLD-CCNC: 52 U/L (ref 40–130)
ALT SERPL-CCNC: 12 U/L (ref 0–33)
ANION GAP SERPL CALCULATED.3IONS-SCNC: 17 MEQ/L (ref 9–15)
AST SERPL-CCNC: 19 U/L (ref 0–35)
BACTERIA: ABNORMAL /HPF
BASOPHILS ABSOLUTE: 0.1 K/UL (ref 0–0.2)
BASOPHILS RELATIVE PERCENT: 0.6 %
BILIRUB SERPL-MCNC: <0.2 MG/DL (ref 0.2–0.7)
BILIRUBIN URINE: NEGATIVE
BLOOD, URINE: NEGATIVE
BUN BLDV-MCNC: 56 MG/DL (ref 8–23)
CALCIUM SERPL-MCNC: 9.6 MG/DL (ref 8.5–9.9)
CHLORIDE BLD-SCNC: 93 MEQ/L (ref 95–107)
CHOLESTEROL, TOTAL: 185 MG/DL (ref 0–199)
CLARITY: CLEAR
CO2: 24 MEQ/L (ref 20–31)
COLOR: YELLOW
CREAT SERPL-MCNC: 0.98 MG/DL (ref 0.5–0.9)
EOSINOPHILS ABSOLUTE: 0.1 K/UL (ref 0–0.7)
EOSINOPHILS RELATIVE PERCENT: 1.5 %
EPITHELIAL CELLS, UA: ABNORMAL /HPF (ref 0–5)
GFR AFRICAN AMERICAN: >60
GFR NON-AFRICAN AMERICAN: 53.6
GLOBULIN: 3.7 G/DL (ref 2.3–3.5)
GLUCOSE BLD-MCNC: 124 MG/DL (ref 70–99)
GLUCOSE URINE: NEGATIVE MG/DL
HBA1C MFR BLD: 7.5 %
HCT VFR BLD CALC: 34 % (ref 37–47)
HDLC SERPL-MCNC: 38 MG/DL (ref 40–59)
HEMOGLOBIN: 11.1 G/DL (ref 12–16)
HYALINE CASTS: ABNORMAL /HPF (ref 0–5)
INR BLD: 1.7
KETONES, URINE: NEGATIVE MG/DL
LDL CHOLESTEROL CALCULATED: 106 MG/DL (ref 0–129)
LEUKOCYTE ESTERASE, URINE: ABNORMAL
LYMPHOCYTES ABSOLUTE: 2.4 K/UL (ref 1–4.8)
LYMPHOCYTES RELATIVE PERCENT: 27.3 %
MCH RBC QN AUTO: 28.2 PG (ref 27–31.3)
MCHC RBC AUTO-ENTMCNC: 32.6 % (ref 33–37)
MCV RBC AUTO: 86.4 FL (ref 82–100)
MONOCYTES ABSOLUTE: 0.6 K/UL (ref 0.2–0.8)
MONOCYTES RELATIVE PERCENT: 7.4 %
NEUTROPHILS ABSOLUTE: 5.6 K/UL (ref 1.4–6.5)
NEUTROPHILS RELATIVE PERCENT: 63.2 %
NITRITE, URINE: NEGATIVE
ORGANISM: ABNORMAL
PDW BLD-RTO: 14.7 % (ref 11.5–14.5)
PH UA: 5 (ref 5–9)
PLATELET # BLD: 349 K/UL (ref 130–400)
POTASSIUM SERPL-SCNC: 4.8 MEQ/L (ref 3.4–4.9)
PROTEIN UA: NEGATIVE MG/DL
PROTHROMBIN TIME: 19.9 SEC (ref 12.3–14.9)
RBC # BLD: 3.93 M/UL (ref 4.2–5.4)
RBC UA: ABNORMAL /HPF (ref 0–2)
SODIUM BLD-SCNC: 134 MEQ/L (ref 135–144)
SPECIFIC GRAVITY UA: 1.02 (ref 1–1.03)
TOTAL PROTEIN: 7.5 G/DL (ref 6.3–8)
TRIGL SERPL-MCNC: 205 MG/DL (ref 0–150)
TSH REFLEX: 2.14 UIU/ML (ref 0.44–3.86)
URINE CULTURE, ROUTINE: ABNORMAL
URINE REFLEX TO CULTURE: YES
UROBILINOGEN, URINE: 0.2 E.U./DL
WBC # BLD: 8.8 K/UL (ref 4.8–10.8)
WBC UA: ABNORMAL /HPF (ref 0–5)

## 2021-01-01 PROCEDURE — 99348 HOME/RES VST EST LOW MDM 30: CPT | Performed by: NURSE PRACTITIONER

## 2021-01-01 PROCEDURE — 99214 OFFICE O/P EST MOD 30 MIN: CPT | Performed by: FAMILY MEDICINE

## 2021-01-01 PROCEDURE — 80061 LIPID PANEL: CPT

## 2021-01-01 PROCEDURE — 84443 ASSAY THYROID STIM HORMONE: CPT

## 2021-01-01 PROCEDURE — 87186 SC STD MICRODIL/AGAR DIL: CPT

## 2021-01-01 PROCEDURE — 99350 HOME/RES VST EST HIGH MDM 60: CPT | Performed by: NURSE PRACTITIONER

## 2021-01-01 PROCEDURE — G0439 PPPS, SUBSEQ VISIT: HCPCS | Performed by: FAMILY MEDICINE

## 2021-01-01 PROCEDURE — 81001 URINALYSIS AUTO W/SCOPE: CPT

## 2021-01-01 PROCEDURE — 1123F ACP DISCUSS/DSCN MKR DOCD: CPT | Performed by: NURSE PRACTITIONER

## 2021-01-01 PROCEDURE — G8420 CALC BMI NORM PARAMETERS: HCPCS | Performed by: NURSE PRACTITIONER

## 2021-01-01 PROCEDURE — 3051F HG A1C>EQUAL 7.0%<8.0%: CPT | Performed by: FAMILY MEDICINE

## 2021-01-01 PROCEDURE — G8417 CALC BMI ABV UP PARAM F/U: HCPCS | Performed by: NURSE PRACTITIONER

## 2021-01-01 PROCEDURE — 1090F PRES/ABSN URINE INCON ASSESS: CPT | Performed by: NURSE PRACTITIONER

## 2021-01-01 PROCEDURE — G8484 FLU IMMUNIZE NO ADMIN: HCPCS | Performed by: NURSE PRACTITIONER

## 2021-01-01 PROCEDURE — 1036F TOBACCO NON-USER: CPT | Performed by: NURSE PRACTITIONER

## 2021-01-01 PROCEDURE — 80053 COMPREHEN METABOLIC PANEL: CPT

## 2021-01-01 PROCEDURE — 83036 HEMOGLOBIN GLYCOSYLATED A1C: CPT | Performed by: FAMILY MEDICINE

## 2021-01-01 PROCEDURE — 4040F PNEUMOC VAC/ADMIN/RCVD: CPT | Performed by: NURSE PRACTITIONER

## 2021-01-01 PROCEDURE — 85025 COMPLETE CBC W/AUTO DIFF WBC: CPT

## 2021-01-01 PROCEDURE — 85610 PROTHROMBIN TIME: CPT

## 2021-01-01 PROCEDURE — 99442 PR PHYS/QHP TELEPHONE EVALUATION 11-20 MIN: CPT | Performed by: NURSE PRACTITIONER

## 2021-01-01 PROCEDURE — G8431 POS CLIN DEPRES SCRN F/U DOC: HCPCS | Performed by: FAMILY MEDICINE

## 2021-01-01 PROCEDURE — 87077 CULTURE AEROBIC IDENTIFY: CPT

## 2021-01-01 PROCEDURE — 87086 URINE CULTURE/COLONY COUNT: CPT

## 2021-01-01 RX ORDER — DOXYCYCLINE HYCLATE 100 MG
100 TABLET ORAL 2 TIMES DAILY
Qty: 20 TABLET | Refills: 0 | Status: SHIPPED | OUTPATIENT
Start: 2021-01-01 | End: 2021-01-01

## 2021-01-01 RX ORDER — MIRTAZAPINE 15 MG/1
15 TABLET, FILM COATED ORAL NIGHTLY
Qty: 90 TABLET | Refills: 1 | OUTPATIENT
Start: 2021-01-01

## 2021-01-01 RX ORDER — AMLODIPINE BESYLATE 5 MG/1
TABLET ORAL
Qty: 90 TABLET | Refills: 0 | Status: SHIPPED | OUTPATIENT
Start: 2021-01-01

## 2021-01-01 RX ORDER — GEL BASE NO.41
1 GEL (GRAM) MISCELLANEOUS DAILY
Qty: 1 EACH | Refills: 5 | Status: SHIPPED | OUTPATIENT
Start: 2021-01-01

## 2021-01-01 RX ORDER — LIDOCAINE HYDROCHLORIDE 20 MG/ML
2.5 SOLUTION OROPHARYNGEAL PRN
Qty: 100 ML | Refills: 0 | Status: SHIPPED | OUTPATIENT
Start: 2021-01-01 | End: 2021-01-01

## 2021-01-01 RX ORDER — SODIUM HYPOCHLORITE 1.25 MG/ML
10 SOLUTION TOPICAL DAILY
Qty: 300 ML | Refills: 5 | Status: SHIPPED | OUTPATIENT
Start: 2021-01-01 | End: 2021-01-01

## 2021-01-01 RX ORDER — MEMANTINE HYDROCHLORIDE 10 MG/1
10 TABLET ORAL 2 TIMES DAILY
Qty: 180 TABLET | Refills: 3 | Status: SHIPPED | OUTPATIENT
Start: 2021-01-01

## 2021-01-01 RX ORDER — GLIPIZIDE 10 MG/1
10 TABLET ORAL
Qty: 180 TABLET | Refills: 0 | Status: SHIPPED | OUTPATIENT
Start: 2021-01-01

## 2021-01-01 RX ORDER — ALENDRONATE SODIUM 70 MG/1
70 TABLET ORAL
Qty: 12 TABLET | Refills: 1 | Status: SHIPPED | OUTPATIENT
Start: 2021-01-01

## 2021-01-01 RX ORDER — ENALAPRIL MALEATE 10 MG/1
10 TABLET ORAL DAILY
Qty: 90 TABLET | Refills: 3 | Status: SHIPPED | OUTPATIENT
Start: 2021-01-01

## 2021-01-01 SDOH — ECONOMIC STABILITY: FOOD INSECURITY: WITHIN THE PAST 12 MONTHS, THE FOOD YOU BOUGHT JUST DIDN'T LAST AND YOU DIDN'T HAVE MONEY TO GET MORE.: NEVER TRUE

## 2021-01-01 SDOH — ECONOMIC STABILITY: FOOD INSECURITY: WITHIN THE PAST 12 MONTHS, YOU WORRIED THAT YOUR FOOD WOULD RUN OUT BEFORE YOU GOT MONEY TO BUY MORE.: NEVER TRUE

## 2021-01-01 ASSESSMENT — ENCOUNTER SYMPTOMS
EYE DISCHARGE: 0
APNEA: 0
APNEA: 0
COUGH: 0
SHORTNESS OF BREATH: 0
CHOKING: 0
CHEST TIGHTNESS: 0
BLOOD IN STOOL: 0
DIARRHEA: 0
SHORTNESS OF BREATH: 0
SHORTNESS OF BREATH: 0
SORE THROAT: 0
COLOR CHANGE: 0
ANAL BLEEDING: 0
BACK PAIN: 0
CHEST TIGHTNESS: 0
COLOR CHANGE: 0
VOMITING: 0
COUGH: 0
CONSTIPATION: 0
STRIDOR: 0
NAUSEA: 0
WHEEZING: 0
TROUBLE SWALLOWING: 0
ABDOMINAL PAIN: 0
APNEA: 0
RECTAL PAIN: 0
VOMITING: 0
NAUSEA: 0
VOICE CHANGE: 0
CONSTIPATION: 0
VOICE CHANGE: 0
CHEST TIGHTNESS: 0
EYE DISCHARGE: 0
TROUBLE SWALLOWING: 0
ABDOMINAL DISTENTION: 0
WHEEZING: 0
CONSTIPATION: 0
BLOOD IN STOOL: 0
VOMITING: 0
SORE THROAT: 0
SORE THROAT: 0
STRIDOR: 0
TROUBLE SWALLOWING: 0
COUGH: 0
WHEEZING: 0
CHOKING: 0
VOMITING: 0
STRIDOR: 0
DIARRHEA: 0
COUGH: 0
ABDOMINAL DISTENTION: 0
ABDOMINAL PAIN: 0
VOICE CHANGE: 0
CONSTIPATION: 0
BACK PAIN: 0
EYE DISCHARGE: 0
ABDOMINAL DISTENTION: 0
CHEST TIGHTNESS: 0
COLOR CHANGE: 0
ABDOMINAL DISTENTION: 0
RECTAL PAIN: 0
DIARRHEA: 0
ANAL BLEEDING: 0
ANAL BLEEDING: 0
DIARRHEA: 0
BLOOD IN STOOL: 0
CHOKING: 0
ABDOMINAL PAIN: 0
ABDOMINAL PAIN: 0
BLOOD IN STOOL: 0
RECTAL PAIN: 0
SHORTNESS OF BREATH: 0
NAUSEA: 0
WHEEZING: 0
BACK PAIN: 0
NAUSEA: 0
ANAL BLEEDING: 0

## 2021-01-01 ASSESSMENT — PATIENT HEALTH QUESTIONNAIRE - PHQ9
SUM OF ALL RESPONSES TO PHQ QUESTIONS 1-9: 14
9. THOUGHTS THAT YOU WOULD BE BETTER OFF DEAD, OR OF HURTING YOURSELF: 1
SUM OF ALL RESPONSES TO PHQ9 QUESTIONS 1 & 2: 4
3. TROUBLE FALLING OR STAYING ASLEEP: 2
7. TROUBLE CONCENTRATING ON THINGS, SUCH AS READING THE NEWSPAPER OR WATCHING TELEVISION: 3
SUM OF ALL RESPONSES TO PHQ QUESTIONS 1-9: 15
8. MOVING OR SPEAKING SO SLOWLY THAT OTHER PEOPLE COULD HAVE NOTICED. OR THE OPPOSITE, BEING SO FIGETY OR RESTLESS THAT YOU HAVE BEEN MOVING AROUND A LOT MORE THAN USUAL: 1
5. POOR APPETITE OR OVEREATING: 2

## 2021-01-01 ASSESSMENT — LIFESTYLE VARIABLES: HOW OFTEN DO YOU HAVE A DRINK CONTAINING ALCOHOL: 0

## 2021-01-01 ASSESSMENT — COLUMBIA-SUICIDE SEVERITY RATING SCALE - C-SSRS
6. HAVE YOU EVER DONE ANYTHING, STARTED TO DO ANYTHING, OR PREPARED TO DO ANYTHING TO END YOUR LIFE?: NO
1. WITHIN THE PAST MONTH, HAVE YOU WISHED YOU WERE DEAD OR WISHED YOU COULD GO TO SLEEP AND NOT WAKE UP?: NO

## 2021-01-01 ASSESSMENT — SOCIAL DETERMINANTS OF HEALTH (SDOH): HOW HARD IS IT FOR YOU TO PAY FOR THE VERY BASICS LIKE FOOD, HOUSING, MEDICAL CARE, AND HEATING?: NOT HARD AT ALL

## 2021-01-11 ENCOUNTER — TELEPHONE (OUTPATIENT)
Dept: PALLATIVE CARE | Age: 86
End: 2021-01-11

## 2021-01-11 RX ORDER — DOXYCYCLINE HYCLATE 100 MG
100 TABLET ORAL 2 TIMES DAILY
Qty: 20 TABLET | Refills: 0 | Status: SHIPPED | OUTPATIENT
Start: 2021-01-11 | End: 2021-01-21

## 2021-01-11 NOTE — TELEPHONE ENCOUNTER
Leah feels that Bryan Ar is \" acting like she has a UTI, usually [facility] will call out an antibiotic\". I offered a UA with a reflex to culture, declined. Will call Doxy to [facility] in [place], call if no improvement in 72 hours.

## 2021-01-24 DIAGNOSIS — F32.A DEPRESSION, UNSPECIFIED DEPRESSION TYPE: Chronic | ICD-10-CM

## 2021-01-26 NOTE — TELEPHONE ENCOUNTER
Short term refill has been sent to pharmacy. Please call patient to schedule VIRTUAL Annual Wellness Visit appointment in the next month.

## 2021-02-08 NOTE — PATIENT INSTRUCTIONS
Personalized Preventive Plan for Ibeth Shah - 2/8/2021  Medicare offers a range of preventive health benefits. Some of the tests and screenings are paid in full while other may be subject to a deductible, co-insurance, and/or copay. Some of these benefits include a comprehensive review of your medical history including lifestyle, illnesses that may run in your family, and various assessments and screenings as appropriate. After reviewing your medical record and screening and assessments performed today your provider may have ordered immunizations, labs, imaging, and/or referrals for you. A list of these orders (if applicable) as well as your Preventive Care list are included within your After Visit Summary for your review. Other Preventive Recommendations:    · A preventive eye exam performed by an eye specialist is recommended every 1-2 years to screen for glaucoma; cataracts, macular degeneration, and other eye disorders. · A preventive dental visit is recommended every 6 months. · Try to get at least 150 minutes of exercise per week or 10,000 steps per day on a pedometer . · Order or download the FREE \"Exercise & Physical Activity: Your Everyday Guide\" from [employer]. Call 0-270.942.6774 or search [employer] online. · You need 3215-6345 mg of calcium and 2831-8203 IU of vitamin D per day. It is possible to meet your calcium requirement with diet alone, but a vitamin D supplement is usually necessary to meet this goal.  · When exposed to the sun, use a sunscreen that protects against both UVA and UVB radiation with an SPF of 30 or greater. Reapply every 2 to 3 hours or after sweating, drying off with a towel, or swimming. · Always wear a seat belt when traveling in a car. Always wear a helmet when riding a bicycle or motorcycle. Heart-Healthy Diet   Sodium, Fat, and Cholesterol Controlled Diet       What Is a Heart Healthy Diet? A heart-healthy diet is one that limits sodium , certain types of fat , and cholesterol . This type of diet is recommended for:   People with any form of cardiovascular disease (eg, coronary heart disease , peripheral vascular disease , previous heart attack , previous stroke )   People with risk factors for cardiovascular disease, such as high blood pressure , high cholesterol , or diabetes   Anyone who wants to lower their risk of developing cardiovascular disease   Sodium    Sodium is a mineral found in many foods. In general, most people consume much more sodium than they need. Diets high in sodium can increase blood pressure and lead to edema (water retention). On a heart-healthy diet, you should consume no more than 2,300 mg (milligrams) of sodium per dayabout the amount in one teaspoon of table salt. The foods highest in sodium include table salt (about 50% sodium), processed foods, convenience foods, and preserved foods. Cholesterol    Cholesterol is a fat-like, waxy substance in your blood. Our bodies make some cholesterol. It is also found in animal products, with the highest amounts in fatty meat, egg yolks, whole milk, cheese, shellfish, and organ meats. On a heart-healthy diet, you should limit your cholesterol intake to less than 200 mg per day. It is normal and important to have some cholesterol in your bloodstream. But too much cholesterol can cause plaque to build up within your arteries, which can eventually lead to a heart attack or stroke. The two types of cholesterol that are most commonly referred to are:   Low-density lipoprotein (LDL) cholesterol  Also known as bad cholesterol, this is the cholesterol that tends to build up along your arteries. Bad cholesterol levels are increased by eating fats that are saturated or hydrogenated. Optimal level of this cholesterol is less than 100. Over 130 starts to get risky for heart disease. High-density lipoprotein (HDL) cholesterol  Also known as good cholesterol, this type of cholesterol actually carries cholesterol away from your arteries and may, therefore, help lower your risk of having a heart attack. You want this level to be high (ideally greater than 60). It is a risk to have a level less than 40. You can raise this good cholesterol by eating olive oil, canola oil, avocados, or nuts. Exercise raises this level, too. Fat    Fat is calorie dense and packs a lot of calories into a small amount of food. Even though fats should be limited due to their high calorie content, not all fats are bad. In fact, some fats are quite healthful. Fat can be broken down into four main types.    The good-for-you fats are:   Monounsaturated fat  found in oils such as olive and canola, avocados, and nuts and natural nut butters; can decrease cholesterol levels, while keeping levels of HDL cholesterol high   Polyunsaturated fat  found in oils such as safflower, sunflower, soybean, corn, and sesame; can decrease total cholesterol and LDL cholesterol   Omega-3 fatty acids  particularly those found in fatty fish (such as salmon, trout, tuna, mackerel, herring, and sardines); can decrease risk of arrhythmias, decrease triglyceride levels, and slightly lower blood pressure   The fats that you want to limit are:   Saturated fat  found in animal products, many fast foods, and a few vegetables; increases total blood cholesterol, including LDL levels   Animal fats that are saturated include: butter, lard, whole-milk dairy products, meat fat, and poultry skin   Vegetable fats that are saturated include: hydrogenated shortening, palm oil, coconut oil, cocoa butter   Hydrogenated or trans fat  found in margarine and vegetable shortening, most shelf stable snack foods, and fried foods; increases LDL and decreases HDL Lean cuts of fresh or frozen beef, veal, lamb, or pork (look for the word loin) Fresh or frozen poultry without the skin Fresh or frozen fish and some shellfish Egg whites and egg substitutes (Limit whole eggs to three per week) Tofu Nuts or seeds (unsalted, dry-roasted), low-sodium peanut butter Dried peas, beans, and lentils   Any smoked, cured, salted, or canned meat, fish, or poultry (including lerner, chipped beef, cold cuts, hot dogs, sausages, sardines, and anchovies) Poultry skins Breaded and/or fried fish or meats Canned peas, beans, and lentils Salted nuts   Fats and Oils   Olive oil and canola oil Low-sodium, low-fat salad dressings and mayonnaise   Butter, margarine, coconut and palm oils, lerner fat   Snacks, Sweets, and Condiments   Low-sodium or unsalted versions of broths, soups, soy sauce, and condiments Pepper, herbs, and spices; vinegar, lemon, or lime juice Low-fat frozen desserts (yogurt, sherbet, fruit bars) Sugar, cocoa powder, honey, syrup, jam, and preserves Low-fat, trans-fat free cookies, cakes, and pies Tono and animal crackers, fig bars, kendal snaps   High-fat desserts Broth, soups, gravies, and sauces, made from instant mixes or other high-sodium ingredients Salted snack foods Canned olives Meat tenderizers, seasoning salt, and most flavored vinegars   Beverages   Low-sodium carbonated beverages Tea and coffee in moderation Soy milk   Commercially softened water   Suggestions   Make whole grains, fruits, and vegetables the base of your diet. Choose heart-healthy fats such as canola, olive, and flaxseed oil, and foods high in heart-healthy fats, such as nuts, seeds, soybeans, tofu, and fish. Eat fish at least twice per week; the fish highest in omega-3 fatty acids and lowest in mercury include salmon, herring, mackerel, sardines, and canned chunk light tuna. If you eat fish less than twice per week or have high triglycerides, talk to your doctor about taking fish oil supplements. Read food labels. For products low in fat and cholesterol, look for fat free, low-fat, cholesterol free, saturated fat free, and trans fat freeAlso scan the Nutrition Facts Label, which lists saturated fat, trans fat, and cholesterol amounts. For products low in sodium, look for sodium free, very low sodium, low sodium, no added salt, and unsalted   Skip the salt when cooking or at the table; if food needs more flavor, get creative and try out different herbs and spices. Garlic and onion also add substantial flavor to foods. Trim any visible fat off meat and poultry before cooking, and drain the fat off after salcido. Use cooking methods that require little or no added fat, such as grilling, boiling, baking, poaching, broiling, roasting, steaming, stir-frying, and sauting. Avoid fast food and convenience food. They tend to be high in saturated and trans fat and have a lot of added salt. Talk to a registered dietitian for individualized diet advice. Last Reviewed: March 2011 Jonah Pierre MS, MPH, RD   Updated: 3/29/2011   ·   Patient information: Weight loss treatments    INTRODUCTION  Obesity is a major international problem, and Americans are among the heaviest people in the world. The percentage of obese people in the [place] has risen steadily. Many people find that although they initially lose weight by dieting, they quickly regain the weight after the diet ends. Because it so hard to keep weight off over time, it is important to have as much information and support as possible before starting a diet. You are most likely to be successful in losing weight and keeping it off when you believe that your body weight can be controlled. STARTING A WEIGHT LOSS PROGRAM  Some people like to talk to their doctor or nurse to get help choosing the best plan, monitoring progress, and getting advice and support along the way. To know what treatment (or combination of treatments) will work best, determine your body mass index (BMI) and waist circumference (measurement). The BMI is calculated from your height and weight. A person with a BMI between 25 and 29.9 is considered overweight   A person with a BMI of 30 or greater is considered to be obese  A waist circumference greater than 35 inches (88 cm) in women and 40 inches (102 cm) in men increases the risk of obesity-related complications, such as heart disease and diabetes. People who are obese and who have a larger waist size may need more aggressive weight loss treatment than others. Talk to your doctor or nurse for advice. Types of treatment  Based on your measurements and your medical history, your doctor or nurse can determine what combination of weight loss treatments would work best for you. Treatments may include changes in lifestyle, exercise, dieting, and, in some cases, weight loss medicines or weight loss surgery. Weight loss surgery, also called bariatric surgery, is reserved for people with severe obesity who have not responded to other weight loss treatments. SETTING A WEIGHT LOSS GOAL  It is important to set a realistic weight loss goal. Your first goal should be to avoid gaining more weight and staying at your current weight (or within 5 percent). Many people have a \"dream\" weight that is difficult or impossible to achieve. People at high risk of developing diabetes who are able to lose 5 percent of their body weight and maintain this weight will reduce their risk of developing diabetes by about 50 percent and reduce their blood pressure. This is a success. Losing more than 15 percent of your body weight and staying at this weight is an extremely good result, even if you never reach your \"dream\" or \"ideal\" weight. LIFESTYLE CHANGES  Programs that help you to change your lifestyle are usually run by psychologists or other professionals. The goals of lifestyle changes are to help you change your eating habits, become more active, and be more aware of how much you eat and exercise, helping you to make healthier choices. This type of treatment can be broken down into three steps: The triggers that make you want to eat   Eating   What happens after you eat  Triggers to eat  Determining what triggers you to eat involves figuring out what foods you eat and where and when you eat. To figure out what triggers you to eat, keep a record for a few days of everything you eat, the places where you eat, how often you eat, and the emotions you were feeling when you ate. For some people, the trigger is related to a certain time of day or night. For others, the trigger is related to a certain place, like sitting at a desk working. Eating  You can change your eating habits by breaking the chain of events between the trigger for eating and eating itself. There are many ways to do this.  For instance, you can:  Limit where you eat to a few places (eg, dining room)   Restrict the number of utensils (eg, only a fork) used for eating   Drink a sip of water between each bite Chew your food a certain number of times   Get up and stop eating every few minutes  What happens after you eat  Rewarding yourself for good eating behaviors can help you to develop better habits. This is not a reward for weight loss; instead, it is a reward for changing unhealthy behaviors. Do not use food as a reward. Some people find money, clothing, or personal care (eg, a hair cut, manicure, or massage) to be effective rewards. Treat yourself immediately after making better eating choices to reinforce the value of the good behavior. You need to have clear behavior goals, and you must have a time frame for reaching your goals. Reward small changes along the way to your final goal.  Other factors that contribute to successful weight loss  Changing your behavior involves more than just changing unhealthy eating habits; it also involves finding people around you to support your weight loss, reducing stress, and learning to be strong when tempted by food. Establish a \"kain\" system  Having a friend or family member available to provide support and reinforce good behavior is very helpful. The support person needs to understand your goals. Learn to be strong  Learning to be strong when tempted by food is an important part of losing weight. As an example, you will need to learn how to say \"no\" and continue to say no when urged to eat at parties and social gatherings. Develop strategies for events before you go, such as eating before you go or taking low-calorie snacks and drinks with you. Develop a support system  Having a support system is helpful when losing weight. This is why many commercial groups are successful. Family support is also essential; if your family does not support your efforts to lose weight, this can slow your progress or even keep you from losing weight. Positive thinking  People often have conversations with themselves in their head; these conversations can be positive or negative. If you eat a piece of cake that was not planned, you may respond by thinking, \"Oh, you stupid idiot, you've blown your diet! \" and as a result, you may eat more cake. A positive thought for the same event could be, \"Well, I ate cake when it was not on my plan. Now I should do something to get back on track. \" A positive approach is much more likely to be successful than a negative one. Reduce stress  Although stress is a part of everyday life, it can trigger uncontrolled eating in some people. It is important to find a way to get through these difficult times without eating or by eating low-calorie food, like raw vegetables. It may be helpful to imagine a relaxing place that allows you to temporarily escape from stress. With deep breaths and closed eyes, you can imagine this relaxing place for a few minutes. Self-help programs  Self-help programs like [employer] Watchers®, Overeaters Anonymous®, and Take Off Amilcar ([facility])© work for some people. As with all weight loss programs, you are most likely to be successful with these plans if you make long-term changes in how you eat. CHOOSING A DIET  A calorie is a unit of energy found in food. Your body needs calories to function. The goal of any diet is to burn up more calories than you eat. How quickly you lose weight depends upon several factors, such as your age, gender, and starting weight. Older people have a slower metabolism than young people, so they lose weight more slowly. Men lose more weight than women of similar height and weight when dieting because they use more energy. People who are extremely overweight lose weight more quickly than those who are only mildly overweight. Try not to drink alcohol or drinks with added sugar, and most sweets (candy, cakes, cookies), since they rarely contain important nutrients. Portion-controlled diets  One simple way to diet is to buy packaged foods, like frozen low-calorie meals or meal-replacement canned drinks. A typical meal plan for one day may include:  A meal-replacement drink or breakfast bar for breakfast   A meal-replacement drink or a frozen low-calorie (250 to 350 calories) meal for lunch   A frozen low-calorie meal or other prepackaged, calorie-controlled meal, along with extra vegetables for dinner  This would give you 1000 to 1500 calories per day. Low-fat diet  To reduce the amount of fat in your diet, you can:  Eat low-fat foods. Low-fat foods are those that contain less than 30 percent of calories from fat. Fat is listed on the food facts label (figure 1). Count fat grams. For a 1500 calorie diet, this would mean about 45 g or fewer of fat per day. Low-carbohydrate diet  Low- and very-low-carbohydrate diets (eg, Atkins diet, [employer]) have become popular ways to lose weight quickly. With a very-low-carbohydrate diet, you eat between 0 and 60 grams of carbohydrates per day (a standard diet contains 200 to 300 grams of carbohydrates)   With a low-carbohydrate diet, you eat between 60 and 130 grams of carbohydrates per day  Carbohydrates are found in fruits, vegetables, and grains (including breads, rice, pasta, and cereal), alcoholic beverages, and in dairy products. Meat and fish do not contain carbohydrates. Side effects of very-low-carbohydrate diets can include constipation, headache, bad breath, muscle cramps, diarrhea, and weakness. Mediterranean diet  The term \"Mediterranean diet\" refers to a way of eating that is common in olive-growing regions around the [facility]. Although there is some variation in Mediterranean diets, there are some similarities.  Most Mediterranean diets include: A high level of monounsaturated fats (from olive or canola oil, walnuts, pecans, almonds) and a low level of saturated fats (from butter)   A high amount of vegetables, fruits, legumes, and grains (7 to 10 servings of fruits and vegetables per day)   A moderate amount of milk and dairy products, mostly in the form of cheese. Use low-fat dairy products (skim milk, fat-free yogurt, low-fat cheese). A relatively low amount of red meat and meat products. Substitute fish or poultry for red meat. For those who drink alcohol, a modest amount (mainly as red wine) may help to protect against cardiovascular disease. A modest amount is up to one (4 ounce) glass per day for women and up to two glasses per day for men. Which diet is best?  Studies have compared different diets, including:  Very-low-carbohydrate (Atkins)   Macronutrient balance controlling glycemic load (Zone®)   Reduced-calorie (Weight Watchers®)   Very-low-fat (Ornish)  No one diet is \"best\" for weight loss. Any diet will help you to lose weight if you stick with the diet. Therefore, it is important to choose a diet that includes foods you like. Fad diets  Fad diets often promise quick weight loss (more than 1 to 2 pounds per week) and may claim that you do not need to exercise or give up favorite foods. Some fad diets cost a lot of money, because you have to pay for seminars or pills. Fad diets generally lack any scientific evidence that they are safe and effective, but instead rely on \"before\" and \"after\" photos or testimonials. Diets that sound too good to be true usually are. These plans are a waste of time and money and are not recommended. A doctor, nurse, or nutritionist can help you find a safe and effective way to lose weight and keep it off. Orlistat  Orlistat (Xenical® 120 mg capsules) is a medicine that reduces the amount of fat your body absorbs from the foods you eat. A lower-dose version is now available without a prescription (Mayito® 60 mg capsules) in many countries, including the [place]. The medicine is recommended three times per day, taken with a meal; you can skip a dose if you skip a meal or if the meal contains no fat. After one year of treatment with orlistat, the average weight loss is approximately 8 to 10 percent of initial body weight (4 percent more than in those who took a placebo). Cholesterol levels often improve, and blood pressure sometimes falls. In people with diabetes, orlistat may help control blood sugar levels. Side effects occur in 15 to 10 percent of people and may include stomach cramps, gas, diarrhea, leakage of stool, or oily stools. These problems are more likely when you take orlistat with a high-fat meal (if more than 30 percent of calories in the meal are from fat). Side effects usually improve as you learn to avoid high-fat foods. Severe liver injury has been reported rarely in patients taking orlistat, but it is not known if orlistat caused the liver problems. Diet supplements  Diet supplements are widely used by people who are trying to lose weight, although the safety and efficacy of these supplements are often unproven. A few of the more common diet supplements are discussed below; none of these are recommended because they have not been studied carefully, and there is no proof they are safe or effective. Chitosan and wheat dextrin are ineffective for weight loss, and their use is not recommended. Ephedra, a compound related to ephedrine, is no longer available in the [place] due to safety concerns. Many nonprescription diet pills previously contained ephedra. Although some studies have shown that ephedra helps with weight loss, there can be serious side effects (psychiatric symptoms, palpitations, and stomach upset), including death. There are not enough data about the safety and efficacy of chromium, ginseng, glucomannan, green tea, hydroxycitric acid, L carnitine, psyllium, pyruvate supplements, Lander wort, and conjugated linoleic acid. Two supplements from [facility], [address] [place] (also known as the [place] 15 pill) and Herbathin dietary supplement, have been shown to contain prescription drugs. Hoodia gordonii is a dietary supplement derived from a plant in [place]. It is not recommended because there is no proof that it is safe or effective. Bitter orange (Citrus aurantium) can increase your heart rate and blood pressure and is not recommended. SHOULD I HAVE SURGERY TO LOSE WEIGHT?  Weight loss surgery is recommended ONLY for people with one of the following:  Severe obesity (body mass index above 40) (calculator 1 and calculator 2) who have not responded to diet, exercise, or weight loss medicines   Body mass index between 35 and 40, along with a serious medical problem (including diabetes, severe joint pain, or sleep apnea) that would improve with weight loss  You should be sure that you understand the potential risks and benefits of weight loss surgery. You must be motivated and willing to make lifelong changes in how you eat to reach and maintain a healthier weight after surgery. You must also be realistic about weight loss after surgery (see 'Effectiveness of weight loss surgery' below). PREPARING FOR WEIGHT LOSS SURGERY  Most people who have weight loss surgery will meet with several specialists before surgery is scheduled. This often includes a dietitian, mental health counselor, a doctor who specializes in care of obese people, and a surgeon who performs weight loss surgery (bariatric surgeon). You may need to work with these providers for several weeks or months before surgery. The nutritionist will explain what and how much you will be able to eat after surgery. You may also need to lose a small amount of weight before surgery. The mental health specialist will help you to cope with stress and other factors that can make it harder to lose weight or trigger you to eat   The medical doctor will determine whether you need other tests, counseling, or treatment before surgery. He or she might also help you begin a medical weight loss program so that you can lose some weight before surgery. The bariatric surgeon will meet with you to discuss the surgeries available to treat obesity. He or she will also make sure you are a good candidate for surgery. TYPES OF WEIGHT LOSS SURGERY  There are several types of weight loss surgeries, the most common being lap banding, gastric bypass, and gastric sleeve. Lap banding  Laparoscopic adjustable gastric banding (LAGB), or lap banding, is a surgery that uses an adjustable band around the opening to the stomach (figure 1). This reduces the amount of food that you can eat at one time. Lap banding is done through small incisions, with a laparoscope. The band can be adjusted after surgery, allowing you to eat more or less food. Adjustments to the size and tightness of the band are made by using a needle to add or remove fluid from a port (a small container under the skin that is connected to the band). Adding fluid to the band makes it tighter which restricts the amount of food you can eat and may help you to lose more weight. Lap banding is a popular choice because it is relatively simple to perform, can be adjusted or removed, and has a low risk of serious complications immediately after surgery. However, weight loss with the lap band depends on your ability to follow the program closely. You will need to prepare nutritious meals that [facility] with\" the band, not against it. For example, the lap band will not work well if you eat or drink a large amount of liquid calories (like ice cream). The band will not help you to feel full when you eat/drink liquid calories. Weight loss ranges from 45 to 75 percent after two years. As an example, a person who is 120 pounds overweight could expect to lose approximately 54 to 90 pounds in the two years after lap banding. Gastric bypass  Rachel-en-Y gastric bypass, also called gastric bypass, helps you to lose weight by reducing the amount of food you can eat and reducing the number of calories and nutrients you absorb from the food you eat. To perform gastric bypass, a surgeon creates a small stomach pouch by dividing the stomach and attaching it to the small intestine. This helps you to lose weight in two ways: The smaller stomach can hold less food than before surgery. This causes you to feel full after eating a very small amount of food or liquid. Over time, the pouch might stretch, allowing you to eat more food. The body absorbs fewer calories, since food bypasses most of the stomach as well as the upper small intestine. This new arrangement seems to decrease your appetite and change how you break down foods by changing the release of various hormones. Gastric bypass can be performed as open surgery (through an incision on the abdomen) or laparoscopically, which uses smaller incisions and smaller instruments. Both the laparoscopic and open techniques have risks and benefits. You and your surgeon should work together to decide which surgery, if any, is right for you. Gastric bypass has a high success rate, and people lose an average of 62 to 68 percent of their excess body weight in the first year. Weight loss typically levels off after one to two years, with an overall excess weight loss between 50 and 75 percent. For a person who is 120 pounds overweight, an average of 60 to 90 pounds of weight loss would be expected. Gastric sleeve  Gastric sleeve, also known as sleeve gastrectomy, is a surgery that reduces the size of the stomach and makes it into a narrow tube (figure 3). The new stomach is much smaller and produces less of the hormone (ghrelin) that causes hunger, helping you feel satisfied with less food. Sleeve gastrectomy is safer than gastric bypass because the intestines are not rearranged, and there is less chance of malnutrition. It also appears to control hunger better than lap banding. It might be safer than the lap banding because no foreign materials are used. The gastric sleeve has a good success rate, and people lose an average of 33 percent of their excess body weight in the first year. For a person who is 120 pounds overweight, this would mean losing about 40 pounds in the first year. WEIGHT LOSS SURGERY COMPLICATIONS  A variety of complications can occur with weight loss surgery. The risks of surgery depend upon which surgery you have and any medical problems you had before surgery. Some of the more common early surgical complications (one to six weeks after surgery) include:  Bleeding   Infection   Blockage or tear in the bowels   Need for further surgery  Important medical complications after surgery can include blood clots in the legs or lungs, heart attack, pneumonia, and urinary tract infection. Complications are less likely when surgery is performed in centers that are experienced in weight loss surgery.  In general, centers with experience in weight loss surgery have:  Board-certified doctors and surgeons It can take several months to learn to listen to your body so that you know when you are hungry and when you are full. You may dislike foods you previously loved, and you may begin to prefer new foods. This can be a frustrating process for some people, so talk to your dietitian if you are having trouble. It usually takes between one and two years to lose weight after surgery. After reaching their goal weight, some people have plastic surgery (called \"body contouring\") to remove excess skin from the body, particularly in the abdominal area. Before you decide to have weight loss surgery, you must commit to staying healthy for life. This includes following up with your healthcare team, exercising most days of the week, and eating a sensible diet every day. It can be difficult to develop new eating and exercise habits after weight loss surgery, and you will have to work hard to stick to your goals. Recovering from surgery and losing weight can be stressful and emotional, and it is important to have the support of family and friends. Working with a , therapist, or support group can help you through the ups and downs. WHERE TO GET MORE INFORMATION  Your healthcare provider is the best source of information for questions and concerns related to your medical problem. This article will be updated as needed every four months on our Web site ([email or web address])  ·     Keep Your Memory [place]       Many factors can affect your ability to remembera hectic lifestyle, aging, stress, chronic disease, and certain medicines. But, there are steps you can take to sharpen your mind and help preserve your memory. Challenge Your Brain   Regularly challenging your mind may help keeps it in top shape. Good mental exercises include:   Crossword puzzlesUse a dictionary if you need it; you will learn more that way. Brainteasers Try some!    Crafts, such as wood working and sewing Hobbies, such as gardening and building model airplanes   SocializingVisit old friends or join groups to meet new ones. Reading   Learning a new language   Taking a class, whether it be art history or sandee chi   TravelingExperience the food, history, and culture of your destination   Learning to use a computer   Going to museums, the theater, or thought-provoking movies   Changing things in your daily life, such as reversing your pattern in the grocery store or brushing your teeth using your nondominant hand   Use Memory Aids   There is no need to remember every detail on your own. These memory aids can help:   Calendars and day planners   Electronic organizers to store all sorts of helpful informationThese devices can \"beep\" to remind you of appointments. A book of days to record birthdays, anniversaries, and other occasions that occur on the same date every year   Detailed \"to-do\" lists and strategically placed sticky notes   Quick \"study\" sessionsBefore a gathering, review who will be there so their names will be fresh in your mind. Establish routinesFor example, keep your keys, wallet, and umbrella in the same place all the time or take medicine with your 8:00 AM glass of juice   Live a Healthy Life   Many actions that will keep your body strong will do the same for your mind. For example:   Talk to Your Doctor About Herbs and Supplements    Malnutrition and vitamin deficiencies can impair your mental function. For example, vitamin B12 deficiency can cause a range of symptoms, including confusion. But, what if your nutritional needs are being met? Can herbs and supplements still offer a benefit? Researchers have investigated a range of natural remedies, such as ginkgo , ginseng , and the supplement phosphatidylserine (PS). So far, though, the evidence is inconsistent as to whether these products can improve memory or thinking. If you are interested in taking herbs and supplements, talk to your doctor first because they may interact with other medicines that you are taking. Exercise Regularly    Among the many benefits of regular exercise are increased blood flow to the brain and decreased risk of certain diseases that can interfere with memory function. One study found that even moderate exercise has a beneficial effect. Examples of \"moderate\" exercise include:   Playing 18 holes of golf once a week, without a cart   Playing tennis twice a week   Walking one mile per day   Manage Stress    It can be tough to remember what is important when your mind is cluttered. Make time for relaxation. Choose activities that calm you down, and make it routine. Manage Chronic Conditions    Side effects of high blood pressure , diabetes, and heart disease can interfere with mental function. Many of the lifestyle steps discussed here can help manage these conditions. Strive to eat a healthy diet, exercise regularly, get stress under control, and follow your doctor's advice for your condition. Minimize Medications    Talk to your doctor about the medicines that you take. Some may be unnecessary. Also, healthy lifestyle habits may lower the need for certain drugs. Last Reviewed: April 2010 Richardson Darden MD   Updated: 4/13/2010   ·     3 [address]       As we get older, changes in balance, gait, strength, vision, hearing, and cognition make even the most youthful senior more prone to accidents. Falls are one of the leading health risks for older people.  This increased risk of falling is related to:   Aging process (eg, decreased muscle strength, slowed reflexes)   Higher incidence of chronic health problems (eg, arthritis, diabetes) that may limit mobility, agility or sensory awareness Side effects of medicine (eg, dizziness, blurred vision)especially medicines like prescription pain medicines and drugs used to treat mental health conditions   Depending on the brittleness of your bones, the consequences of a fall can be serious and long lasting. Home Life   Research by the Association of Aging [facility]) shows that some home accidents among older adults can be prevented by making simple lifestyle changes and basic modifications and repairs to the home environment. Here are some lifestyle changes that experts recommend:   Have your hearing and vision checked regularly. Be sure to wear prescription glasses that are right for you. Speak to your doctor or pharmacist about the possible side effects of your medicines. A number of medicines can cause dizziness. If you have problems with sleep, talk to your doctor. Limit your intake of alcohol. If necessary, use a cane or walker to help maintain your balance. Wear supportive, rubber-soled shoes, even at home. If you live in a region that gets wintry weather, you may want to put special cleats on your shoes to prevent you from slipping on the snow and ice. Exercise regularly to help maintain muscle tone, agility, and balance. Always hold the banister when going up or down stairs. Also, use  bars when getting in or out of the bath or shower, or using the toilet. To avoid dizziness, get up slowly from a lying down position. Sit up first, dangling your legs for a minute or two before rising to a standing position. Overall Home Safety Check   According to the Consumer Product Safety Commision's \"Older Consumer Home Safety Checklist,\" it is important to check for potential hazards in each room. And remember, proper lighting is an essential factor in home safety. If you cannot see clearly, you are more likely to fall.    Important questions to ask yourself include: Are lamp, electric, extension, and telephone cords placed out of the flow of traffic and maintained in good condition? Have frayed cords been replaced? Are all small rugs and runners slip resistant? If not, you can secure them to the floor with a special double-sided carpet tape. Are smoke detectors properly locatedone on every floor of your home and one outside of every sleeping area? Are they in good working order? Are batteries replaced at least once a year? Do you have a well-maintained carbon monoxide detector outside every sleeping are in your home? Does your furniture layout leave plenty of space to maneuver between and around chairs, tables, beds, and sofas? Are hallways, stairs and passages between rooms well lit? Can you reach a lamp without getting out of bed? Are floor surfaces well maintained? Shag rugs, high-pile carpeting, tile floors, and polished wood floors can be particularly slippery. Stairs should always have handrails and be carpeted or fitted with a non-skid tread. Is your telephone easily reachable. Is the cord safely tucked away? Room by Room   According to the Association of Aging, bathrooms and shelby are the two most potentially hazardous rooms in your home. In the Kitchen    Be sure your stove is in proper working order and always make sure burners and the oven are off before you go out or go to sleep. Keep pots on the back burners, turn handles away from the front of the stove, and keep stove clean and free of grease build-up. Kitchen ventilation systems and range exhausts should be working properly. Keep flammable objects such as towels and pot holders away from the cooking area except when in use. Make sure kitchen curtains are tied back. Move cords and appliances away from the sink and hot surfaces. If extension cords are needed, install wiring guides so they do not hang over the sink, range, or working areas. Look for coffee pots, kettles and toaster ovens with automatic shut-offs. Keep a mop handy in the kitchen so you can wipe up spills instantly. You should also have a small fire extinguisher. Arrange your kitchen with frequently used items on lower shelves to avoid the need to stand on a stepstool to reach them. Make sure countertops are well-lit to avoid injuries while cutting and preparing food. In the Bathroom    Use a non-slip mat or decals in the tub and shower, since wet, soapy tile or porcelain surfaces are extremely slippery. Make sure bathroom rugs are non-skid or tape them firmly to the floor. Bathtubs should have at least one, preferably two, grab bars, firmly attached to structural supports in the wall. (Do not use built-in soap holders or glass shower doors as grab bars.)    Tub seats fitted with non-slip material on the legs allow you to wash sitting down. For people with limited mobility, bathtub transfer benches allow you to slide safely into the tub. Raised toilet seats and toilet safety rails are helpful for those with knee or hip problems. In the [facility]    Make sure you use a nightlight and that the area around your bed is clear of potential obstacles. Be careful with electric blankets and never go to sleep with a heating pad, which can cause serious burns even if on a low setting. Use fire-resistant mattress covers and pillows, and NEVER smoke in bed. Keep a phone next to the bed that is programmed to dial 911 at the push of a button. If you have a chronic condition, you may want to sign on with an automatic call-in service. Typically the system includes a small pendant that connects directly to an emergency medical voice-response system. You should also make arrangements to stay in contact with someonefriend, neighbor, family memberon a regular schedule.    Fire Prevention According to the National S.A.F.E. (Smoke Alarms for Every) [address] [facility], senior citizens are one of the two highest risk groups for death and serious injuries due to residential fires. When cooking, wear short-sleeved items, never a bulky long-sleeved robe. The [facility]'s Safety Checklist for Older Consumers emphasizes the importance of checking basements, garages, workshops and storage areas for fire hazards, such as volatile liquids, piles of old rags or clothing and overloaded circuits. Never smoke in bed or when lying down on a couch or recliner chair. Small portable electric or kerosene heaters are responsible for many home fires and should be used cautiously if at all. If you do use one, be sure to keep them away from flammable materials. In case of fire, make sure you have a pre-established emergency exit plan. Have a professional check your fireplace and other fuel-burning appliances yearly. [employer]   Baby boomers entering the abbott years will continue to see the development of new products to help older adults live safely and independently in spite of age-related changes. Making Life More Livable  , by Leonor Rivas, lists over 1,000 products for \"living well in the mature years,\" such as bathing and mobility aids, household security devices, ergonomically designed knives and peelers, and faucet valves and knobs for temperature control. Medical supply stores and organizations are good sources of information about products that improve your quality of life and insure your safety.      Last Reviewed: November 2009 Popeye Thompson MD   Updated: 3/7/2011     ·

## 2021-02-08 NOTE — PROGRESS NOTES
Medicare Annual Wellness Visit  Name: Sonja Reyes Date: 2021   MRN: [id number] Sex: Female   Age: 80 y.o. Ethnicity: Non-/Non    : 1934 Race: Samantha Barnhart is here for Medicare AWV    Screenings for behavioral, psychosocial and functional/safety risks, and cognitive dysfunction are all negative except as indicated below. These results, as well as other patient data from the [address] form, are documented in Flowsheets linked to this Encounter. No Known Allergies      Prior to Visit Medications    Medication Sig Taking? Authorizing Provider   enalapril (VASOTEC) 10 MG tablet Take 1 tablet by mouth daily Yes Kosovan Republic, MD   memantine (NAMENDA) 10 MG tablet Take 1 tablet by mouth 2 times daily Yes Kosovan Republic, MD   sertraline (ZOLOFT) 50 MG tablet Take 1 tablet by mouth daily Yes Calderon Samuel MD   traZODone (DESYREL) 50 MG tablet Take 1 tablet by mouth nightly Yes Kosovan Republic, MD   Insulin Pen Needle (B-D ULTRAFINE III SHORT PEN) 31G X 8 MM MISC 1 each by Does not apply route daily Yes Kosovan Republic, MD   glipiZIDE (GLUCOTROL) 10 MG tablet Take 1 tablet by mouth 2 times daily (before meals) Yes Kosovan Republic, MD   amLODIPine (NORVASC) 5 MG tablet Take 1 tablet by mouth daily Yes Calderon Samuel MD   alendronate (FOSAMAX) 70 MG tablet Take 1 tablet by mouth every 7 days Take with a full glass of water on an empty stomach at least 30 minutes before the first food, beverage, or medication. Stay upright for at least 30 minutes and until after first food of the day. Do not crush or break.  Yes Epimenio Mohs, APRN - NP   donepezil (ARICEPT) 10 MG tablet Take 1 tablet by mouth nightly Yes Kosovan Republic, MD   Cholecalciferol (VITAMIN D3) 5000 units CAPS Take 1 capsule by mouth daily  Patient taking differently: Take 2,000 Units by mouth daily  Yes Kosovan Republic, MD Handicap Placard MISC by Does not apply route DX: Dementia (F03.90), Type 2 Diabetes (E11.8), Atrial Fibrillation (I48.91)     EXPIRES: 05/2023 Yes Jewels Banks MD   warfarin (COUMADIN) 2.5 MG tablet Take 2.5 mg by mouth daily Take daily as directed per physician based on INR results Yes Historical Provider, MD   Psyllium ([employer] NATURAL FIBER SUPPLEMENT PO) Take 1 tablet by mouth 2 times daily  Yes Historical Provider, MD   ferrous sulfate (FE TABS) 325 (65 Fe) MG EC tablet Take 1 tablet by mouth daily (with breakfast) Yes Michael Griffith MD   acetaminophen (APAP EXTRA STRENGTH) 500 MG tablet Take 2 tablets by mouth 3 times daily as needed for Pain Yes Michael Griffith MD   Blood Glucose Monitoring Suppl TROY Use TID Yes Michael Griffith MD   Glucose Blood (BLOOD GLUCOSE TEST STRIPS) STRP Use TID Yes Michael Griffith MD   Lancets MISC 1 each by Does not apply route 3 times daily Yes Michael Griffith MD   Blood Glucose Monitoring Suppl (FREESTYLE FREEDOM) KIT Test once daily. DX: E11.8 Yes Michael Griffith MD   sotalol (BETAPACE) 80 MG tablet Take 1 tablet by mouth 2 times daily Yes MERCEDES Bonilla   Calcium Carbonate-Vitamin D (CALCIUM + D PO) Take 1 tablet by mouth daily Yes Historical Provider, MD   Compression Stockings MISC by Does not apply route. Knee high Yes MERCEEDS Bonilla   atenolol (TENORMIN) 50 MG tablet Take 50 mg by mouth daily Take 1 tablet daily Yes Historical Provider, MD   atorvastatin (LIPITOR) 10 MG tablet Take 10 mg by mouth daily At bedtime Yes Historical Provider, MD   aspirin 81 MG tablet Take 81 mg by mouth daily.  Yes Historical Provider, MD         Past Medical History:   Diagnosis Date    Atrial fibrillation ([facility]     CKD (chronic kidney disease) stage 3, GFR 30-59 ml/min 5/15/2018    DDD (degenerative disc disease), lumbar 5/21/2018    Dementia with behavioral disturbance ([facility] 12/9/2015    Depression     Diverticular disease 2004    ct pelvis  DJD (degenerative joint disease) of knee     both    GERD (gastroesophageal reflux disease)     GERD (gastroesophageal reflux disease) 5/15/2018    Hernia, hiatal     egd    History of cancer of left breast 4/27/2018 2010 s/p left mastectomy    HTN (hypertension) 6/27/2013    Hyperlipidemia     Insomnia     Malignant ne[facility] of female breast ([facility] 2010    left breast s/p mastectomy    Osteopenia 2003    Pancreas cyst 2007    Scoliosis 5/21/2018    Type 2 diabetes mellitus with complication, without long-term current use of insulin ([facility] 6/27/2013       Past Surgical History:   Procedure Laterality Date    APPENDECTOMY  1974    BLEPHAROPLASTY Bilateral 2011    cosmetic    CATARACT REMOVAL Left     OU    COLONOSCOPY  2006    ENDOSCOPY, COLON, DIAGNOSTIC      HYSTERECTOMY  1974    KNEE ARTHROSCOPY Left 04/2007    left    LUMBAR LAMINECTOMY  1994    with disc surgery    MASTECTOMY Left 2010    left    OVARY REMOVAL Right 1974    only right    IL HEMORRHOIDECTOMY,INT/EXT,1 COLUMN/GROUP N/A 2/1/2018    HEMORRHOIDECTOMY performed by Glen Villarreal MD [facility] Right 2002    right    ROTATOR CUFF REPAIR Left 2003    left    TOE AMPUTATION Left 1969    1st and 2nd toes distal tips d/t  accident   [address]         Family History   Problem Relation Age of Onset    Heart Disease Mother         enlarged heart    Cancer Father         bone    No Known Problems Sister        CareTeam (Including outside providers/suppliers regularly involved in providing care):   Patient Care Team:  Laura Garzon MD as PCP - General (Family Medicine)  Laura Garzon MD as P[facility] - [facility] Empaneled Provider  Marium Moreno MD as Consulting Physician (Cardiology)  GAL Hardwick - CNP as Nurse Practitioner (Cardiology)  Sonny Del Valle MD as Consulting Physician (Cardiac Electrophysiology) Irais Montoya MD as Consulting Physician (Urology)  GAL Rizo NP as Advanced Practice Nurse (Palliative Medicine)    Wt Readings from Last 3 Encounters:   10/26/20 137 lb 3.2 oz (62.2 kg)   10/05/20 133 lb 9.6 oz (60.6 kg)   09/11/20 139 lb 12.8 oz (63.4 kg)     No flowsheet data found. There is no height or weight on file to calculate BMI. Based upon direct observation of the patient, evaluation of cognition reveals global memory impairment noted. Patient's complete Health Risk Assessment and screening values have been reviewed and are found in Flowsheets. The following problems were reviewed today and where indicated follow up appointments were made and/or referrals ordered. Positive Risk Factor Screenings with Interventions:     Fall Risk:  2 or more falls in past year?: (!) yes(Pt hunter cant remember dates, last time she fell she got splinter and had to go to ED.)  Fall with injury in past year?: no  Fall Risk Interventions:    · Home safety tips provided     Depression:  PHQ-2 Score: 4  PHQ-9 Total Score: 15    Severity:1-4 = minimal depression, 5-9 = mild depression, 10-14 = moderate depression, 15-19 = moderately severe depression, 20-27 = severe depression  Depression Interventions:  · Family declines any further management. Will continue current medication.   · Patient declines any further evaluation/treatment for this issue       Health Habits/Nutrition:  Health Habits/Nutrition  Do you exercise for at least 20 minutes 2-3 times per week?: (!) No  Have you lost any weight without trying in the past 3 months?: (!) Yes  Do you eat only one meal per day?: No  Have you seen the dentist within the past year?: (!) No     Health Habits/Nutrition Interventions:  · Inadequate physical activity:  patient is not ready to increase his/her physical activity level at this time · Nutritional issues:  educational materials for healthy, well-balanced diet provided, educational materials to promote weight loss provided  · Dental exam overdue:  patient encouraged to make appointment with his/her dentist    Hearing/Vision:  No exam data present  Hearing/Vision  Do you or your family notice any trouble with your hearing that hasn't been managed with hearing aids?: (!) Yes  Do you have difficulty driving, watching TV, or doing any of your daily activities because of your eyesight?: No  Have you had an eye exam within the past year?: (!) No  Hearing/Vision Interventions:  · Hearing concerns:  patient declines any further evaluation/treatment for hearing issues  · Vision concerns:  patient encouraged to make appointment with his/her eye specialist     ADL:  ADLs  In the past 7 days, did you need help from others to perform any of the following everyday activities? Eating, dressing, grooming, bathing, toileting, or walking/balance?: (!) Eating, Dressing, Grooming, Bathing, Toileting, Walking/Balance(Has care taker and son)  In the past 7 days, did you need help from others to take care of any of the following?  Laundry, housekeeping, banking/finances, shopping, telephone use, food preparation, transportation, or taking medications?: (!) Taking Medications, Transportation, Food Preparation, Telephone Use, Shopping, Banking/Finances, Laundry, Housekeeping(Has care taker and son)  ADL Interventions:  · Patient declines any further evaluation/treatment for this issue    Personalized Preventive Plan   Current Health Maintenance Status  Immunization History   Administered Date(s) Administered    Influenza 10/05/2011, 10/02/2013    Influenza A (H1T2-42) Vaccine PF IM 01/14/2010    Influenza Virus Vaccine 10/28/2003, 10/21/2014, 10/01/2015    Influenza Whole 09/10/2005, 11/10/2006, 10/10/2007, 10/10/2008, 09/10/2009    Influenza, High Dose (Fluzone 65 yrs and older) 10/02/2018, 09/24/2019  Influenza, Quadv, IM, (6 mo and older Fluzone, Flulaval, Fluarix and 3 yrs and older Afluria) 10/21/2002, 10/28/2003, 11/04/2016    Influenza, Quadv, IM, PF (6 mo and older Fluzone, Flulaval, Fluarix, and 3 yrs and older Afluria) 09/26/2017    Influenza, Triv, inactivated, subunit, adjuvanted, IM (Fluad 65 yrs and older) 10/24/2019    Pneumococcal Conjugate 13-valent ([id number]) 12/09/2015    Pneumococcal Polysaccharide ([id number]) 01/01/2000, 09/01/2000, 09/01/2006    Tdap (Boostrix, Adacel) 09/25/2020        Health Maintenance   Topic Date Due    COVID-19 Vaccine (1 of 2) 09/02/1950    Shingles Vaccine (1 of 2) 09/02/1984    Annual Wellness Visit (AWV)  05/29/2019    Flu vaccine (1) 09/01/2020    Lipid screen  06/26/2021    Potassium monitoring  10/15/2021    Creatinine monitoring  10/15/2021    DTaP/Tdap/Td vaccine (2 - Td) 09/25/2030    Pneumococcal 65+ years Vaccine  Completed    Hepatitis A vaccine  Aged Out    Hib vaccine  Aged Out    Meningococcal (ACWY) vaccine  Aged Out     Recommendations for [employer] Due: see orders and patient instructions/AVS.  . Recommended screening schedule for the next 5-10 years is provided to the patient in written form: see Patient Instructions/AVS.    Shruthi Nunez was seen today for medicare awv. Diagnoses and all orders for this visit:    Routine general medical examination at a health care facility    Type 2 diabetes mellitus with complication, without long-term current use of insulin ([facility]  -     Hemoglobin A1C; Future  -     Comprehensive Metabolic Panel; Future  -     Lipid Panel; Future    Essential hypertension  -     enalapril (VASOTEC) 10 MG tablet; Take 1 tablet by mouth daily  -     Comprehensive Metabolic Panel; Future  -     CBC Auto Differential; Future    Atrial fibrillation, unspecified type ([facility]  -     Comprehensive Metabolic Panel; Future  -     CBC Auto Differential; Future  -     TSH with Reflex;  Future Hyperlipidemia, unspecified hyperlipidemia type  -     Comprehensive Metabolic Panel; Future  -     Lipid Panel; Future    Stage 3b chronic kidney disease  -     Comprehensive Metabolic Panel; Future  -     CBC Auto Differential; Future    Gastroesophageal reflux disease, unspecified whether esophagitis present  -     CBC Auto Differential; Future    Depression, unspecified depression type  -     Vitamin D 25 Hydroxy; Future    Dementia with behavioral disturbance, unspecified dementia type (HCC)  -     TSH with Reflex; Future  -     memantine (NAMENDA) 10 MG tablet; Take 1 tablet by mouth 2 times daily    Vitamin D deficiency  -     Vitamin D 25 Hydroxy; Future    Osteoporosis without current pathological fracture, unspecified osteoporosis type  -     Vitamin D 25 Hydroxy; Future    Positive depression screening  Comments:  Stable mood overall per son (POA). Will continue to follow over time.    Orders:  -     Positive Screen for Clinical Depression with a Documented Follow-up Plan            Follow-up in 6 Months for Chronic Disease Check Khanh Serna is a 80 y.o. female being evaluated by a Virtual Visit (video and audio) encounter to address concerns as mentioned above. A caregiver was present when appropriate. Due to this being a TeleHealth encounter (During DOEBY-12 public health emergency), evaluation of the following organ systems was limited: Vitals/Constitutional/EENT/Resp/CV/GI//MS/Neuro/Skin/Heme-Lymph-Imm. Pursuant to the emergency declaration under the [address] [place] [address] and the [employer] Act, this Virtual Visit was conducted with patient's (and/or legal guardian's) consent, to reduce the patient's risk of exposure to COVID-19 and provide necessary medical care. The patient (and/or legal guardian) has also been advised to contact this office for worsening conditions or problems, and seek emergency medical treatment and/or call 911 if deemed necessary. Patient identification was verified at the start of the visit: Yes    Services were provided through a video synchronous discussion virtually to substitute for in-person clinic visit. Patient and provider were located at their individual homes. --Constantine Navarrete MD on 2/8/2021 at 3:56 PM    An electronic signature was used to authenticate this note.

## 2021-03-12 NOTE — TELEPHONE ENCOUNTER
Pts [facility] Called and Stated that the pt has been having Diarrhea and has been using Immodium with no relief and would like to know if there is anything else she can use.  Was Jonathan Lynn  [phone]

## 2021-03-12 NOTE — TELEPHONE ENCOUNTER
Spoke with Leah. She states that the patient has been having diarrhea x 2 days. There has been no medication or dietary changes. Patient has been having about 4-5 stools per day. She has now become incontinent due to the stool urgency. She has been taking imodium without any relief. She denies abdominal pain, fevers, nausea, or vomiting. Leah states the stool has a foul odor. Advised her that I will stool sample to rule out c-dff. Advised her to increase the patients fluid intake to prevent dehydration. Also, call the office if the patient develops any other symptoms. Patient has advanced dementia and is not able to provide any history.

## 2021-03-12 NOTE — TELEPHONE ENCOUNTER
Lexi Meek,    This patient needs an appointment with one of the providers. The last time she was seen was 11/2020 by Yaima Greenberg. I will call her today and address her immediate concern.     Thank you,    Minnie Licea

## 2021-03-26 NOTE — PROGRESS NOTES
Subjective:      TELEHEALTH EVALUATION -- Audio/Visual (During MZQOM-37 public health emergency)     Due to COVID 19 outbreak, patient's office visit was converted to a virtual visit. Patient was contacted and agreed to proceed with a virtual visit via Telephone Visit  The risks and benefits of converting to a virtual visit were discussed in light of the current infectious disease epidemic. Patient also understood that insurance coverage and co-pays are up to their individual insurance plans. Patient Id: Maryse Otero at  home in [place] for follow up for symptom management. She was accompanied to the appointment by: Her caregiver, Leah. Chief Complaint   Patient presents with    Weight Loss    Follow-up        HPI  Khanh Serna is a 80 y.o. female seen and examined for symptomatic mangement related to Dementia and CKD. Home visit is necessary in lieu of office due to significant frailty and high symptom burden from comorbid illnesses. Patient seen and examined via VV. She has Dementia and her caregiver provided the history. Pt is calm, cooperative and in NAD. Her caregiver reports that she only eats about 10% of her breakfast and lunch. She also states \"She is 80years old with [employer] so what do you expect\". Educated caregiver, Leah on the importance of nutrition especially in the elderly. Asked Leah about oral intake for dinner. She states that she is not there in the evening with the patient nor on the weekend. Caregiver reports that the patient has been sleeping a lot. Her appetite is poor she only consumes about 10% to 25% of her breakfast and lunch. She is gradually loosing weight. Leah states that the patient is incontinent of bowel and bladder and she requires assistance with ambulation. Attempted to do medication reconciliation. Leah is not sure what medications the patient is taking or if she takes her medications every day.  She tells me that the patient's son is the one who gives her the medications. Patient is supposed to be on Remeron for her weight loss. Remeron is no longer on her medication list.    Phone call placed to patient's son for medication reconciliation. He tells me that he lives in [facility] and he goes and sees his mother sometimes. He also states that Leah is there during the day with the patient and the patient is home alone during the night and on the weekends. He believes that she is still taking Remeron. States that he will go by her house tonight and call the office with the information. Advised him that a CNP will be coming out for face to face appointment in April. The appointment was made with the caregiver, Leah. Patient ambulates with a walker with assistance, per Leah. Denies significant sleep disturbance, depression, anxiety, or agitation episodes; denies suicidal or homicidal ideation or signs suggesting existential grief or spiritual pain.        Past Medical History:   Diagnosis Date    Atrial fibrillation ([address]     CKD (chronic kidney disease) stage 3, GFR 30-59 ml/min 5/15/2018    DDD (degenerative disc disease), lumbar 5/21/2018    Dementia with behavioral disturbance ([address] 12/9/2015    Depression     Diverticular disease 2004    ct pelvis    DJD (degenerative joint disease) of knee     both    GERD (gastroesophageal reflux disease)     GERD (gastroesophageal reflux disease) 5/15/2018    Hernia, hiatal     egd    History of cancer of left breast 4/27/2018    2010 s/p left mastectomy    HTN (hypertension) 6/27/2013    Hyperlipidemia     Insomnia     Malignant neoplasm of female breast ([address] 2010    left breast s/p mastectomy    Osteopenia 2003    Pancreas cyst 2007    Scoliosis 5/21/2018    Type 2 diabetes mellitus with complication, without long-term current use of insulin ([address] 6/27/2013     Past Surgical History:   Procedure Laterality Date    APPENDECTOMY  1974    BLEPHAROPLASTY Bilateral 2011    cosmetic    CATARACT REMOVAL Left OU    COLONOSCOPY  2006    ENDOSCOPY, COLON, DIAGNOSTIC      HYSTERECTOMY  1974    KNEE ARTHROSCOPY Left 04/2007    left    LUMBAR LAMINECTOMY  1994    with disc surgery    MASTECTOMY Left 2010    left    OVARY REMOVAL Right 1974    only right    CO HEMORRHOIDECTOMY,INT/EXT,1 COLUMN/GROUP N/A 2/1/2018    HEMORR[facility]IDECTOMY [facility] by [facility] Right 2002    right    ROTATOR CUFF REPAIR Left 2003    left    TOE AMPUTATION Left 1969    1st and 2nd toes distal tips d/t  accident   [address]     Social History     Socioeconomic History    Marital status:       Spouse name: Not on file    Number of children: Not on file    Years of education: Not on file    Highest education level: Not on file   Occupational History    Occupation: Retired   Social Needs    Financial resource strain: Not on file    Food insecurity     Worry: Not on file     Inability: Not on file   [employer] needs     Medical: Not on file     Non-medical: Not on file   Tobacco Use    Smoking status: Never Smoker    Smokeless tobacco: Never Used   Substance and Sexual Activity    Alcohol use: No    Drug use: No    Sexual activity: Not Currently   Lifestyle    Physical activity     Days per week: Not on file     Minutes per session: Not on file    Stress: Not on file   Relationships    Social connections     Talks on phone: Not on file     Gets together: Not on file     Attends Mu-ism service: Not on file     Active member of club or organization: Not on file     Attends meetings of clubs or organizations: Not on file     Relationship status: Not on file    Intimate partner violence     Fear of current or ex partner: Not on file     Emotionally abused: Not on file     Physically abused: Not on file     Forced sexual activity: Not on file   Other Topics Concern    Not on file   Social History Narrative    Lives with a caregiver, son is POA and lives in [facility]. Son checks on her in the evenings. 1 cat    1 dog     Family History   Problem Relation Age of Onset    Heart Disease Mother         enlarged heart    Cancer Father         bone    No Known Problems Sister      No Known Allergies  Current Outpatient Medications on File Prior to Visit   Medication Sig Dispense Refill    glipiZIDE (GLUCOTROL) 10 MG tablet Take 1 tablet by mouth 2 times daily (before meals) 180 tablet 1    enalapril (VASOTEC) 10 MG tablet Take 1 tablet by mouth daily 90 tablet 3    memantine (NAMENDA) 10 MG tablet Take 1 tablet by mouth 2 times daily 180 tablet 3    sertraline (ZOLOFT) 50 MG tablet Take 1 tablet by mouth daily 90 tablet 0    traZODone (DESYREL) 50 MG tablet Take 1 tablet by mouth nightly 90 tablet 1    Insulin Pen Needle (B-D ULTRAFINE III SHORT PEN) 31G X 8 MM MISC 1 each by Does not apply route daily 100 each 5    amLODIPine (NORVASC) 5 MG tablet Take 1 tablet by mouth daily 90 tablet 1    alendronate (FOSAMAX) 70 MG tablet Take 1 tablet by mouth every 7 days Take with a full glass of water on an empty stomach at least 30 minutes before the first food, beverage, or medication. Stay upright for at least 30 minutes and until after first food of the day. Do not crush or break.  12 tablet 1    donepezil (ARICEPT) 10 MG tablet Take 1 tablet by mouth nightly 90 tablet 3    Cholecalciferol (VITAMIN D3) 5000 units CAPS Take 1 capsule by mouth daily (Patient taking differently: Take 2,000 Units by mouth daily ) 90 capsule 3    Handicap Placard MISC by Does not apply route DX: Dementia (F03.90), Type 2 Diabetes (E11.8), Atrial Fibrillation (I48.91)     EXPIRES: 05/2023 1 each 0    warfarin (COUMADIN) 2.5 MG tablet Take 2.5 mg by mouth daily Take daily as directed per physician based on INR results      Psyllium ([employer] NATURAL FIBER SUPPLEMENT PO) Take 1 tablet by mouth 2 times daily       ferrous sulfate (FE TABS) 325 (65 Fe) MG EC tablet Take 1 tablet by mouth daily (with breakfast) 90 tablet 1    acetaminophen (APAP EXTRA STRENGTH) 500 MG tablet Take 2 tablets by mouth 3 times daily as needed for Pain 60 tablet 3    Blood Glucose Monitoring Suppl TROY Use TID 1 Device 0    Glucose Blood (BLOOD GLUCOSE TEST STRIPS) STRP Use  strip 11    Lancets MISC 1 each by Does not apply route 3 times daily 100 each 11    Blood Glucose Monitoring Suppl (FREESTYLE FREEDOM) KIT Test once daily. DX: E11.8 1 kit 0    sotalol (BETAPACE) 80 MG tablet Take 1 tablet by mouth 2 times daily 180 tablet 1    Calcium Carbonate-Vitamin D (CALCIUM + D PO) Take 1 tablet by mouth daily      Compression Stockings MISC by Does not apply route. Knee high 1 each 0    atenolol (TENORMIN) 50 MG tablet Take 50 mg by mouth daily Take 1 tablet daily      atorvastatin (LIPITOR) 10 MG tablet Take 10 mg by mouth daily At bedtime      aspirin 81 MG tablet Take 81 mg by mouth daily. No current facility-administered medications on file prior to visit. Review of Systems   Unable to perform ROS: Dementia       Objective: There were no vitals taken for this visit. Wt Readings from Last 3 Encounters:   10/26/20 137 lb 3.2 oz (62.2 kg)   10/05/20 133 lb 9.6 oz (60.6 kg)   09/11/20 139 lb 12.8 oz (63.4 kg)     Physical Exam  Constitutional:       General: She is not in acute distress. Appearance: She is not ill-appearing. Neurological:      Mental Status: She is alert. Gait: Gait abnormal.   Psychiatric:         Cognition and Memory: Cognition is impaired. Memory is impaired. Assessment and Plan:      1. Weight loss  -Poor oral intake (10-25% of meals) reported by caregiver  -Not sure if patient is still taking Remeron  -Will have CNP face to face visit before any additional intervention is implemented    2. Palliative care encounter  -Provide support to patient.  Call for any questions concerns, or change in condition.  -Educated Caregiver on the importance of nutrition especially in the elderly  -Phone call placed to patient's son for medication reconciliation. He will go to the patient's house tonight and call the office with her medication list.    3. Dementia with behavioral disturbance, unspecified dementia type ([facility]  -Currently on Aricept  -Patient lives alone with a   -Avoid BEERS critieria    4. Stage 3b chronic kidney disease  -Avoid nephrotoxic medications  -Maintain appointment with PCP  -Routine labs to monitor kidney function    5. Debility  -Change in function or cognition from baseline is unknown at this time  -Reviewed safety and comfort measures  -Maintain use of assistive devices PRN    6. Impaired mobility  -Ambulates with a walker with assistance      There are no discontinued medications. Discussed with patient/surrogate: POC, Treatment risks/benefits, and alternatives including as noted above. All questions were answered. Gave much active listening, presence, and emotional support. Due to acuity, symptomatology and high-risk medication management,   I advised patient to Return in about 4 weeks (around 4/23/2021), or if symptoms worsen or fail to improve, for Symptom management. Total Time 30 mins   > 50% Time Spent Counseling/Care coordination?  yes     GAL Rogers - CNP    Collaborating physician: Dr. Serjio Alvarez

## 2021-04-05 NOTE — TELEPHONE ENCOUNTER
Pts son called and stated that he received a call last week from one of the providers in [facility] asking about a medication he is not sure who he spoke with but the medication is  Mirtazapine 15mg Date receive 1/12/21

## 2021-04-18 NOTE — TELEPHONE ENCOUNTER
Pharmacy is requesting medication refill. Please approve or deny this request.    Rx requested:  Requested Prescriptions     Pending Prescriptions Disp Refills    alendronate (FOSAMAX) 70 MG tablet 12 tablet 1     Sig: Take 1 tablet by mouth every 7 days Take with a full glass of water on an empty stomach at least 30 minutes before the first food, beverage, or medication. Stay upright for at least 30 minutes and until after first food of the day. Do not crush or break.          Last Office Visit:   2/8/2021      Next Visit Date:  Future Appointments   Date Time Provider Sofia Ramírez   4/28/2021 11:00 AM GAL Rabago - CNP [facility] EMERGENCY [facility]   8/9/2021  3:40 PM MD [address]

## 2021-04-28 NOTE — PROGRESS NOTES
Subjective:      Patient Id:  Juan Pablo Conner is a 80 y.o. female who presents today with   Chief Complaint   Patient presents with    Follow-up          HPI   Seen at home  for symptom management follow up rt Dementia, A fib, CKD3, DDD, GERD, HTN, HLD, Breast cancer in remission, osteopenia, DM2. Demeanor calm and relaxed, NAD, caregiver present for visit. Seen in the home setting due to disease related symptoms and debility create heavy physical and financial burden to get to a clinic setting. She is able to make her needs known and offers no complaints. Ambulates with walker. No recent falls. Negative pain. Red area size of a quarter to back of left heel, no skin breakdown. Caregiver applying emollient cream.    Negative headaches, dizziness, or vision changes. No dysphagia, dysgeusia, or mouth sores; weight stable, Appetite at baseline. No GI or  concerns. No heidi blood in stool or urine. Negative SOB or edema, excessive fatigue, fever, chills, myalgias, increased sputum production or cough, nausea, vomiting, chest pain, or orthopnea. Negative significant Sleep disturbance, depression, anxiety, or agitation episodes.      Past Medical History:   Diagnosis Date    Atrial fibrillation ([address]     CKD (chronic kidney disease) stage 3, GFR 30-59 ml/min 5/15/2018    DDD (degenerative disc disease), lumbar 5/21/2018    Dementia with behavioral disturbance ([address] 12/9/2015    Depression     Diverticular disease 2004    ct pelvis    DJD (degenerative joint disease) of knee     both    GERD (gastroesophageal reflux disease)     GERD (gastroesophageal reflux disease) 5/15/2018    Hernia, hiatal     egd    History of cancer of left breast 4/27/2018    2010 s/p left mastectomy    HTN (hypertension) 6/27/2013    Hyperlipidemia     Insomnia     Malignant neoplasm of female breast ([address] 2010    left breast s/p mastectomy    Osteopenia 2003    Pancreas cyst 2007    Scoliosis 5/21/2018    Type 2 diabetes mellitus with complication, without long-term current use of insulin ([facility] 6/27/2013     Past Surgical History:   Procedure Laterality Date    APPENDECTOMY  1974    BLEPHAROPLASTY Bilateral 2011    cosmetic    CATARACT REMOVAL Left     OU    COLONOSCOPY  2006    ENDOSCOPY, COLON, DIAGNOSTIC      HYSTERECTOMY  1974    KNEE ARTHROSCOPY Left 04/2007    left    LUMBAR LAMINECTOMY  1994    with disc surgery    MASTECTOMY Left 2010    left    OVARY REMOVAL Right 1974    only right    NM HEMORRHOIDECTOMY,INT/EXT,1 COLUMN/GROUP N/A 2/1/2018    HEMORRHOIDECTOMY performed by Nelly Tran MD at [place] [address] Right 2002    right    ROTATOR CUFF REPAIR Left 2003    left    TOE AMPUTATION Left 1969    1st and 2nd toes distal tips d/t  accident   [address]     Social History     Socioeconomic History    Marital status:       Spouse name: Not on file    Number of children: Not on file    Years of education: Not on file    Highest education level: Not on file   Occupational History    Occupation: Retired   Social Needs    Financial resource strain: Not on file    Food insecurity     Worry: Not on file     Inability: Not on file   [employer] needs     Medical: Not on file     Non-medical: Not on file   Tobacco Use    Smoking status: Never Smoker    Smokeless tobacco: Never Used   Substance and Sexual Activity    Alcohol use: No    Drug use: No    Sexual activity: Not Currently   Lifestyle    Physical activity     Days per week: Not on file     Minutes per session: Not on file    Stress: Not on file   Relationships    Social connections     Talks on phone: Not on file     Gets together: Not on file     Attends Taoism service: Not on file     Active member of club or organization: Not on file     Attends meetings of clubs or organizations: Not on file     Relationship status: Not on file    Intimate partner violence     Fear of current or ex partner: Not on file     Emotionally abused: Not on file     Physically abused: Not on file     Forced sexual activity: Not on file   Other Topics Concern    Not on file   Social History Narrative    Lives with a caregiver, son is POA and lives in [facility]. Son checks on her in the evenings. 1 cat    1 dog     Family History   Problem Relation Age of Onset    Heart Disease Mother         enlarged heart    Cancer Father         bone    No Known Problems Sister      No Known Allergies  Current Outpatient Medications on File Prior to Visit   Medication Sig Dispense Refill    alendronate (FOSAMAX) 70 MG tablet Take 1 tablet by mouth every 7 days Take with a full glass of water on an empty stomach at least 30 minutes before the first food, beverage, or medication. Stay upright for at least 30 minutes and until after first food of the day. Do not crush or break.  12 tablet 1    amLODIPine (NORVASC) 5 MG tablet Take 1 tablet by mouth daily 90 tablet 1    sertraline (ZOLOFT) 50 MG tablet Take 1 tablet by mouth daily 90 tablet 1    glipiZIDE (GLUCOTROL) 10 MG tablet Take 1 tablet by mouth 2 times daily (before meals) 180 tablet 1    enalapril (VASOTEC) 10 MG tablet Take 1 tablet by mouth daily 90 tablet 3    memantine (NAMENDA) 10 MG tablet Take 1 tablet by mouth 2 times daily 180 tablet 3    traZODone (DESYREL) 50 MG tablet Take 1 tablet by mouth nightly 90 tablet 1    Insulin Pen Needle (B-D ULTRAFINE III SHORT PEN) 31G X 8 MM MISC 1 each by Does not apply route daily 100 each 5    donepezil (ARICEPT) 10 MG tablet Take 1 tablet by mouth nightly 90 tablet 3    Cholecalciferol (VITAMIN D3) 5000 units CAPS Take 1 capsule by mouth daily (Patient taking differently: Take 2,000 Units by mouth daily ) 90 capsule 3    Handicap Placard MISC by Does not apply route DX: Dementia (F03.90), Type 2 Diabetes (E11.8), Atrial Fibrillation (I48.91)     EXPIRES: 05/2023 1 each 0    Genitourinary: Negative for difficulty urinating, dysuria, enuresis, frequency and hematuria. Musculoskeletal: Negative for arthralgias, back pain, gait problem, joint swelling and myalgias. Skin: Negative for color change, pallor, rash and wound. Allergic/Immunologic: Negative for food allergies and immunocompromised state. Neurological: Negative for dizziness, tremors, seizures, syncope, facial asymmetry, speech difficulty, weakness, light-headedness, numbness and headaches. Hematological: Negative for adenopathy. Does not bruise/bleed easily. Psychiatric/Behavioral: Negative for agitation, behavioral problems, confusion, decreased concentration, dysphoric mood, hallucinations, self-injury, sleep disturbance and suicidal ideas. The patient is not nervous/anxious and is not hyperactive. Objective:   /68   Pulse 60   Resp 18   SpO2 96%     Physical Exam  Constitutional:       General: She is not in acute distress. Appearance: She is well-developed. She is not diaphoretic. HENT:      Head: Normocephalic and atraumatic. Right Ear: External ear normal.      Left Ear: External ear normal.      Nose: Nose normal.      Mouth/Throat:      Pharynx: No oropharyngeal exudate. Eyes:      General: No scleral icterus. Right eye: No discharge. Left eye: No discharge. Conjunctiva/sclera: Conjunctivae normal.      Pupils: Pupils are equal, round, and reactive to light. Neck:      Musculoskeletal: Normal range of motion and neck supple. Thyroid: No thyromegaly. Vascular: No JVD. Trachea: No tracheal deviation. Cardiovascular:      Rate and Rhythm: Normal rate and regular rhythm. Heart sounds: Normal heart sounds. Pulmonary:      Effort: Pulmonary effort is normal. No respiratory distress. Breath sounds: Normal breath sounds. No stridor. No wheezing or rales. Chest:      Chest wall: No tenderness.    Abdominal:      General: Bowel sounds are normal. There is no distension. Palpations: Abdomen is soft. There is no mass. Tenderness: There is no abdominal tenderness. There is no guarding or rebound. Musculoskeletal: Normal range of motion. General: No tenderness or deformity. Lymphadenopathy:      Cervical: No cervical adenopathy. Skin:     General: Skin is warm and dry. Capillary Refill: Capillary refill takes less than 2 seconds. Findings: No erythema or rash. Comments: Red area left heel, skin intact   Neurological:      Mental Status: She is alert. Mental status is at baseline. Psychiatric:         Attention and Perception: Attention normal.         Mood and Affect: Mood normal.         Behavior: Behavior is cooperative. Cognition and Memory: Cognition is impaired. Memory is impaired. Assessment:       Diagnosis Orders   1. Palliative care encounter     2. Dementia with behavioral disturbance, unspecified dementia type ([facility] [address]     3. Pressure injury of left heel, stage 1     4. Frequent UTI            Plan:    Stage one pressure sore to left heel  - Continue emollient cream  - Open to air  - Lift heels with pillow to remove pressure    Dementia  - Continue Zoloft  - Monitor for signs and symptoms of delirium as due to advanced age, history of cognitive difficulty, acute illness, and multiple co morbidities patient is high risk.  Offer continuous behavior redirection and orientation, avoid delirium inducing medication, expose to natural sunlight as much as possible, calm surroundings as much as possible     Frequent UTI  - Push fluids  - exercise as tolerated  - UTI ER pack in home, call prior to initiating  - Reviewed signs and symptoms of UTI    GAL Crews - CNP

## 2021-06-01 NOTE — PROGRESS NOTES
Subjective:      Patient Id:  Tavo Hillman is a 80 y.o. female who presents today with   Chief Complaint   Patient presents with    Follow-up          HPI     Seen at home  for symptom management follow up rt Dementia, A fib, CKD3, DDD, GERD, HTN, HLD, Breast cancer in remission, osteopenia, DM2. Demeanor calm and relaxed, NAD. Caregiver present for visit who offers ROS and histroy due to Lupis's advanced dementia. Seen in the home setting due to disease related symptoms and debility create heavy physical and financial burden to get to a clinic setting. She is able to make her needs known and offers no complaints. Ambulates with walker. No recent falls. Negative pain. Negative headaches, dizziness, or vision changes. No dysphagia, dysgeusia, or mouth sores; weight stable, Appetite at baseline. No GI or  concerns. No heidi blood in stool or urine. Negative SOB or edema, excessive fatigue, fever, chills, myalgias, increased sputum production or cough, nausea, vomiting, chest pain, or orthopnea. Negative significant Sleep disturbance, depression, anxiety, or agitation episodes.      Past Medical History:   Diagnosis Date    Atrial fibrillation ([address]     CKD (chronic kidney disease) stage 3, GFR 30-59 ml/min 5/15/2018    DDD (degenerative disc disease), lumbar 5/21/2018    Dementia with behavioral disturbance ([address] 12/9/2015    Depression     Diverticular disease 2004    ct pelvis    DJD (degenerative joint disease) of knee     both    GERD (gastroesophageal reflux disease)     GERD (gastroesophageal reflux disease) 5/15/2018    Hernia, hiatal     egd    History of cancer of left breast 4/27/2018 2010 s/p left mastectomy    HTN (hypertension) 6/27/2013    Hyperlipidemia     Insomnia     Malignant neoplasm of female breast ([address] 2010    left breast s/p mastectomy    Osteopenia 2003    Pancreas cyst 2007    Scoliosis 5/21/2018    Type 2 diabetes mellitus with complication, without long-term current use of insulin ([facility] 6/27/2013     Past Surgical History:   Procedure Laterality Date    APPENDECTOMY  1974    BLEPHAROPLASTY Bilateral 2011    cosmetic    CATARACT REMOVAL Left     OU    COLONOSCOPY  2006    ENDOSCOPY, COLON, DIAGNOSTIC      HYSTERECTOMY  1974    KNEE ARTHROSCOPY Left 04/2007    left    LUMBAR LAMINECTOMY  1994    with disc surgery    MASTECTOMY Left 2010    left    OVARY REMOVAL Right 1974    only right    [facility] HEMORRHOIDECTOMY,INT/EXT,1 COLUMN/GROUP N/A 2/1/2018    HEMORRHOIDECTOMY performed by Whitley Ash MD at [facility] Right 2002    right    ROTATOR CUFF REPAIR Left 2003    left    TOE AMPUTATION Left 1969    1st and 2nd toes distal tips d/t  accident   [address]     Social History     Socioeconomic History    Marital status:      Spouse name: Not on file    Number of children: Not on file    Years of education: Not on file    Highest education level: Not on file   Occupational History    Occupation: Retired   Tobacco Use    Smoking status: Never Smoker    Smokeless tobacco: Never Used   Vaping Use    Vaping Use: Never used   Substance and Sexual Activity    Alcohol use: No    Drug use: No    Sexual activity: Not Currently   Other Topics Concern    Not on file   Social History Narrative    Lives with a caregiver, son is POA and lives in [facility]. Son checks on her in the evenings. 1 cat    1 dog     Social Determinants of Health     Financial Resource Strain:     Difficulty of Paying Living Expenses:    Food Insecurity:     Worried About Running Out of Food in the Last Year:     [address] in the Last Year:    Transportation Needs:     Lack of Transportation (Medical):      Lack of Transportation (Non-Medical):    Physical Activity:     Days of Exercise per Week:     Minutes of Exercise per Session:    Stress:     Feeling of Stress : (E11.8), Atrial Fibrillation (I48.91)     EXPIRES: 05/2023 1 each 0    warfarin (COUMADIN) 2.5 MG tablet Take 2.5 mg by mouth daily Take daily as directed per physician based on INR results      Psyllium ([employer] NATURAL FIBER SUPPLEMENT PO) Take 1 tablet by mouth 2 times daily       ferrous sulfate (FE TABS) 325 (65 Fe) MG EC tablet Take 1 tablet by mouth daily (with breakfast) 90 tablet 1    acetaminophen (APAP EXTRA STRENGTH) 500 MG tablet Take 2 tablets by mouth 3 times daily as needed for Pain 60 tablet 3    Blood Glucose Monitoring Suppl TROY Use TID 1 Device 0    Glucose Blood (BLOOD GLUCOSE TEST STRIPS) STRP Use  strip 11    Lancets MISC 1 each by Does not apply route 3 times daily 100 each 11    Blood Glucose Monitoring Suppl (FREESTYLE FREEDOM) KIT Test once daily. DX: E11.8 1 kit 0    sotalol (BETAPACE) 80 MG tablet Take 1 tablet by mouth 2 times daily 180 tablet 1    Calcium Carbonate-Vitamin D (CALCIUM + D PO) Take 1 tablet by mouth daily      Compression Stockings MISC by Does not apply route. Knee high 1 each 0    atenolol (TENORMIN) 50 MG tablet Take 50 mg by mouth daily Take 1 tablet daily      atorvastatin (LIPITOR) 10 MG tablet Take 10 mg by mouth daily At bedtime      aspirin 81 MG tablet Take 81 mg by mouth daily. No current facility-administered medications on file prior to visit. Review of Systems   Unable to perform ROS: Dementia   Constitutional: Negative for activity change, appetite change, chills, diaphoresis, fatigue, fever and unexpected weight change. HENT: Negative for drooling, hearing loss, mouth sores, sore throat, trouble swallowing and voice change. Eyes: Negative for discharge and visual disturbance. Respiratory: Negative for apnea, cough, choking, chest tightness, shortness of breath, wheezing and stridor. Cardiovascular: Negative for chest pain, palpitations and leg swelling.    Gastrointestinal: Negative for abdominal distention,

## 2021-06-18 NOTE — TELEPHONE ENCOUNTER
Pt's caregiver called stating that Kirk Omalley has a UTI, and they started her on the antibiotics. They also wanted to report that Kirk Omalley has not been eating well, and has been loosing about a pound a day for the last 4 days. Please call pt's son Sincere Schwartz with any instructions.

## 2021-08-16 NOTE — ASSESSMENT & PLAN NOTE
Patient is established with palliative care for routine follow-up. Home nursing care is in place throughout the day and son is obtaining further private nursing to help with filling in any gaps in the evening prior to bedtime.   Patient will continue with current medication

## 2021-08-16 NOTE — PROGRESS NOTES
Kingsley Pollack (: 1934) is a 80 y.o. female, Established patient, who presents today for:    Chief Complaint   Patient presents with    6 Month Follow-Up     chronic disease check     Weight Loss     concern for weightloss, Son reports caregiver was not assisting in feeds. ASSESSMENT/PLAN    1. Type 2 diabetes mellitus with complication, without long-term current use of insulin (HCC)  Assessment & Plan:  A1c reasonably well-controlled based on age, comorbidities, and risk factors. Son instructed to continue with current medication  Orders:  -     POCT glycosylated hemoglobin (Hb A1C)  -     Comprehensive Metabolic Panel; Future  -     Lipid Panel; Future  2. Atrial fibrillation, unspecified type [facility])  Assessment & Plan:  Currently asymptomatic and rate controlled. Patient remains on Coumadin without signs of bleeding. Coumadin levels are followed per cardiology office. Son reported patient being due for next Coumadin check and was advised to contact cardiology office to make arrangements  Orders:  -     CBC Auto Differential; Future  -     Comprehensive Metabolic Panel; Future  -     TSH with Reflex; Future  3. Essential hypertension  Assessment & Plan:  Blood pressure within normal limits on repeat testing today in the office. Son instructed to continue with current medication. Orders:  -     Comprehensive Metabolic Panel; Future  4. Hyperlipidemia, unspecified hyperlipidemia type  -     Comprehensive Metabolic Panel; Future  -     Lipid Panel; Future  5. Stage 3b chronic kidney disease (HCC)  -     CBC Auto Differential; Future  -     Comprehensive Metabolic Panel; Future  -     Vitamin D 25 Hydroxy; Future  6. Vitamin D deficiency  -     Vitamin D 25 Hydroxy; Future  7. Dementia with behavioral disturbance, unspecified dementia type [facility])  Assessment & Plan:  Patient is established with palliative care for routine follow-up.   Home nursing care is in place throughout the day and son is obtaining further private nursing to help with filling in any gaps in the evening prior to bedtime. Patient will continue with current medication      Return in about 6 months (around 2/16/2022) for Annual Wellness Visit. SUBJECTIVE/OBJECTIVE:    HPI    Patient presents accompanied by son for chronic diabetes, atrial fibrillation, hypertension, hyperlipidemia, chronic kidney disease, and vitamin D deficiency follow-up. Son reports patient taking medications as prescribed since the most recent visit. They deny adhering to any specific lower carbohydrate or lower salt diet. They deny any routine exercise outside of normal day-to-day activity. Currently [facility] is providing home health aid that is in the home from 8am to 5pm providing help with dressing, meals, toileting, bathing, and dosing of medication. Son is looking to hire private nursing care to augment this care. There are no reported falls since the most recent visit. There is no reported pain. Memory remains poor and son believes there has been a small amount of worsening compared to most recent visit. Weight loss has been noted over the past year, however, son believes this is due to previous nursing service not allowing patient sufficient time to eat meals and taking away her trays of food early. There is no reported nausea, vomiting, diarrhea, or constipation. Son has started giving patient meal supplement shakes in addition to meals. There is no reported polyuria or polydipsia, new onset vision changes, or new onset paresthesias. There is no reported chest pain, dyspnea, palpitations, lightheadedness, dizziness, worsening lower extremity edema, or syncope. Son denies noting patient to have any dyspnea at rest, persistent wheezing, worsening cough, or chest tightness.       Current Outpatient Medications on File Prior to Visit   Medication Sig Dispense Refill    traZODone (DESYREL) 50 MG tablet Take 1 tablet by mouth nightly 90 tablet 1    donepezil (ARICEPT) 10 MG tablet Take 1 tablet by mouth nightly 90 tablet 1    omeprazole (PRILOSEC) 20 MG delayed release capsule Take 1 capsule by mouth Daily 90 capsule 1    alendronate (FOSAMAX) 70 MG tablet Take 1 tablet by mouth every 7 days Take with a full glass of water on an empty stomach at least 30 minutes before the first food, beverage, or medication. Stay upright for at least 30 minutes and until after first food of the day. Do not crush or break.  12 tablet 1    amLODIPine (NORVASC) 5 MG tablet Take 1 tablet by mouth daily 90 tablet 1    sertraline (ZOLOFT) 50 MG tablet Take 1 tablet by mouth daily 90 tablet 1    glipiZIDE (GLUCOTROL) 10 MG tablet Take 1 tablet by mouth 2 times daily (before meals) 180 tablet 1    enalapril (VASOTEC) 10 MG tablet Take 1 tablet by mouth daily 90 tablet 3    memantine (NAMENDA) 10 MG tablet Take 1 tablet by mouth 2 times daily 180 tablet 3    Insulin Pen Needle (B-D ULTRAFINE III SHORT PEN) 31G X 8 MM MISC 1 each by Does not apply route daily 100 each 5    Cholecalciferol (VITAMIN D3) 5000 units CAPS Take 1 capsule by mouth daily (Patient taking differently: Take 2,000 Units by mouth daily ) 90 capsule 3    Handicap Placard MISC by Does not apply route DX: Dementia (F03.90), Type 2 Diabetes (E11.8), Atrial Fibrillation (I48.91)     EXPIRES: 05/2023 1 each 0    warfarin (COUMADIN) 2.5 MG tablet Take 2.5 mg by mouth daily Take daily as directed per physician based on INR results      Psyllium ([employer] NATURAL FIBER SUPPLEMENT PO) Take 1 tablet by mouth 2 times daily       ferrous sulfate (FE TABS) 325 (65 Fe) MG EC tablet Take 1 tablet by mouth daily (with breakfast) 90 tablet 1    acetaminophen (APAP EXTRA STRENGTH) 500 MG tablet Take 2 tablets by mouth 3 times daily as needed for Pain (Patient taking differently: Take 1,000 mg by mouth 3 times daily as needed for Pain ) 60 tablet 3    Blood Glucose Monitoring Suppl TROY Use TID 1 Device 0    Glucose Blood (BLOOD GLUCOSE TEST STRIPS) STRP Use  strip 11    Lancets MISC 1 each by Does not apply route 3 times daily 100 each 11    Blood Glucose Monitoring Suppl (FREESTYLE FREEDOM) KIT Test once daily. DX: E11.8 1 kit 0    sotalol (BETAPACE) 80 MG tablet Take 1 tablet by mouth 2 times daily 180 tablet 1    Calcium Carbonate-Vitamin D (CALCIUM + D PO) Take 1 tablet by mouth daily      Compression Stockings MISC by Does not apply route. Knee high 1 each 0    atorvastatin (LIPITOR) 10 MG tablet Take 10 mg by mouth daily At bedtime      aspirin 81 MG tablet Take 81 mg by mouth daily.  atenolol (TENORMIN) 50 MG tablet Take 50 mg by mouth daily Take 1 tablet daily (Patient not taking: Reported on 8/16/2021)       No current facility-administered medications on file prior to visit. No Known Allergies     Review of Systems   Constitutional: Positive for unexpected weight change (weight loss). Negative for appetite change, chills, diaphoresis, fatigue and fever. Eyes: Negative for visual disturbance. Respiratory: Negative for cough, chest tightness, shortness of breath and wheezing. Cardiovascular: Negative for chest pain, palpitations and leg swelling. No orthopnea, No PND   Gastrointestinal: Negative for abdominal distention, abdominal pain, anal bleeding, blood in stool, constipation, diarrhea, nausea and vomiting. No heartburn, No melena   Endocrine: Negative for cold intolerance, heat intolerance, polydipsia, polyphagia and polyuria. Genitourinary: Negative for dysuria and hematuria. Musculoskeletal: Negative for myalgias. Skin: Negative for rash. Neurological: Negative for dizziness, syncope, weakness, light-headedness, numbness and headaches. Psychiatric/Behavioral: Negative for dysphoric mood and sleep disturbance. The patient is not nervous/anxious.         Vitals:  /74 (Site: Right Upper Arm, Position: Sitting, Cuff Size: Medium Adult)   Pulse 66 Ht 5' (1.524 m)   Wt 123 lb (55.8 kg)   SpO2 97%   BMI 24.02 kg/m²     Results for POC orders placed in visit on 08/16/21   POCT glycosylated hemoglobin (Hb A1C)   Result Value Ref Range    Hemoglobin A1C 7.5 %         Physical Exam  Vitals reviewed. Constitutional:       General: She is not in acute distress. Appearance: She is not ill-appearing, toxic-appearing or diaphoretic. Eyes:      General: No scleral icterus. Neck:      Thyroid: No thyroid mass, thyromegaly or thyroid tenderness. Vascular: No carotid bruit. Cardiovascular:      Rate and Rhythm: Normal rate and regular rhythm. Heart sounds: Normal heart sounds. No murmur heard. Pulmonary:      Effort: Pulmonary effort is normal. No respiratory distress. Breath sounds: Normal breath sounds. No wheezing, rhonchi or rales. Abdominal:      General: Bowel sounds are normal. There is no distension. Palpations: Abdomen is soft. Tenderness: There is no abdominal tenderness. There is no right CVA tenderness, left CVA tenderness, guarding or rebound. Musculoskeletal:      Cervical back: Neck supple. Right lower leg: No edema. Left lower leg: No edema. Lymphadenopathy:      Cervical: No cervical adenopathy. Skin:     Findings: No rash. Neurological:      Mental Status: She is alert. Mental status is at baseline. Comments: Patient oriented to person alone. Psychiatric:         Mood and Affect: Mood normal.         Behavior: Behavior normal.         Thought Content: Thought content normal.         Ortho Exam (If Applicable)              An electronic signature was used to authenticate this note.      Zohra Barragan MD

## 2021-08-16 NOTE — ASSESSMENT & PLAN NOTE
Currently asymptomatic and rate controlled. Patient remains on Coumadin without signs of bleeding. Coumadin levels are followed per cardiology office.   Son reported patient being due for next Coumadin check and was advised to contact cardiology office to make arrangements

## 2021-08-16 NOTE — ASSESSMENT & PLAN NOTE
A1c reasonably well-controlled based on age, comorbidities, and risk factors.   Son instructed to continue with current medication

## 2021-08-16 NOTE — ASSESSMENT & PLAN NOTE
Blood pressure within normal limits on repeat testing today in the office. Son instructed to continue with current medication.

## 2021-10-27 NOTE — PROGRESS NOTES
Subjective:      Patient Id:  Brynn Varghese is a 80 y.o. female who presents today with   No chief complaint on file. HPI     Seen at home  for symptom management follow up rt Dementia, A fib, CKD3, DDD, GERD, HTN, HLD, Breast cancer in remission, osteopenia, DM2. Demeanor calm and relaxed, NAD. Caregiver present for visit who offers ROS and his rose marie due to Lupis's advanced dementia. Seen in the home setting due to disease related symptoms and debility create heavy physical and financial burden to get to a clinic setting. Ambulates with walker. 1 person assist with transfers. Recently slid off chair onto carpet. Incontinent of stool and urine, must be hand fed. Stage  Two bordering on three sacral ulcer. Family is repositioning her, having her sit on a donut, applying Desitin to area. Family unsure what to do as it keeps getting worse. Positive weight loss, poor appetite, does not seem to know what food is for any longer. Her last aide was just placing food in front of her and not hand feeding her     No GI or  concerns. No heidi blood in stool or urine. Negative SOB or edema, excessive fatigue, fever, chills, myalgias, increased sputum production or cough, nausea, vomiting, chest pain, or orthopnea. Negative significant Sleep disturbance, depression, anxiety, or agitation episodes.      Past Medical History:   Diagnosis Date    Atrial fibrillation ([address]     CKD (chronic kidney disease) stage 3, GFR 30-59 ml/min ([address] 5/15/2018    DDD (degenerative disc disease), lumbar 5/21/2018    Dementia with behavioral disturbance ([address] 12/9/2015    Depression     Diverticular disease 2004    ct pelvis    DJD (degenerative joint disease) of knee     both    GERD (gastroesophageal reflux disease)     GERD (gastroesophageal reflux disease) 5/15/2018    Hernia, hiatal     egd    History of cancer of left breast 4/27/2018 2010 s/p left mastectomy    HTN (hypertension) 6/27/2013    Hyperlipidemia     Insomnia     Malignant neoplasm of female breast ([facility] 2010    left breast s/p mastectomy    Osteopenia 2003    Pancreas cyst 2007    Scoliosis 5/21/2018    Type 2 diabetes mellitus with complication, without long-term current use of insulin ([facility] 6/27/2013     Past Surgical History:   Procedure Laterality Date    APPENDECTOMY  1974    BLEPHAROPLASTY Bilateral 2011    cosmetic    CATARACT REMOVAL Left     OU    COLONOSCOPY  2006    ENDOSCOPY, COLON, DIAGNOSTIC      HYSTERECTOMY  1974    KNEE ARTHROSCOPY Left 04/2007    left    LUMBAR LAMINECTOMY  1994    with disc surgery    MASTECTOMY Left 2010    left    OVARY REMOVAL Right 1974    only right    TN HEMORRHOIDECTOMY,INT/EXT,1 COLUMN/GROUP N/A 2/1/2018    HEMORRHOIDECTOMY performed by Ashley Mendoza MD at [facility] [address] Right 2002    right    ROTATOR CUFF REPAIR Left 2003    left    TOE AMPUTATION Left 1969    1st and 2nd toes distal tips d/t  accident   [address]     Social History     Socioeconomic History    Marital status:      Spouse name: Not on file    Number of children: Not on file    Years of education: Not on file    Highest education level: Not on file   Occupational History    Occupation: Retired   Tobacco Use    Smoking status: Never Smoker    Smokeless tobacco: Never Used   Vaping Use    Vaping Use: Never used   Substance and Sexual Activity    Alcohol use: No    Drug use: No    Sexual activity: Not Currently   Other Topics Concern    Not on file   Social History Narrative    Lives with a caregiver, son is POA and lives in [facility]. Son checks on her in the evenings.          1 cat    1 dog     Social Determinants of Health     Financial Resource Strain: Low Risk     Difficulty of Paying Living Expenses: Not hard at all   Food Insecurity: No Food Insecurity    Worried About Running Out of Food in the Last Year: Never true  Ran Out of Food in the Last Year: Never true   Transportation Needs:     Lack of Transportation (Medical):  Lack of Transportation (Non-Medical):    Physical Activity:     Days of Exercise per Week:     Minutes of Exercise per Session:    Stress:     Feeling of Stress :    Social Connections:     Frequency of Communication with Friends and Family:     Frequency of Social Gatherings with Friends and Family:     Attends Baptism Services:     Active Member of Clubs or Organizations:     Attends Club or Organization Meetings:     Marital Status:    Intimate Partner Violence:     Fear of Current or Ex-Partner:     Emotionally Abused:     Physically Abused:     Sexually Abused:      Family History   Problem Relation Age of Onset    Heart Disease Mother         enlarged heart    Cancer Father         bone    No Known Problems Sister      No Known Allergies  Current Outpatient Medications on File Prior to Visit   Medication Sig Dispense Refill    traZODone (DESYREL) 50 MG tablet Take 1 tablet by mouth nightly 90 tablet 1    donepezil (ARICEPT) 10 MG tablet Take 1 tablet by mouth nightly 90 tablet 1    omeprazole (PRILOSEC) 20 MG delayed release capsule Take 1 capsule by mouth Daily 90 capsule 1    alendronate (FOSAMAX) 70 MG tablet Take 1 tablet by mouth every 7 days Take with a full glass of water on an empty stomach at least 30 minutes before the first food, beverage, or medication. Stay upright for at least 30 minutes and until after first food of the day. Do not crush or break.  12 tablet 1    amLODIPine (NORVASC) 5 MG tablet Take 1 tablet by mouth daily 90 tablet 1    sertraline (ZOLOFT) 50 MG tablet Take 1 tablet by mouth daily 90 tablet 1    glipiZIDE (GLUCOTROL) 10 MG tablet Take 1 tablet by mouth 2 times daily (before meals) 180 tablet 1    enalapril (VASOTEC) 10 MG tablet Take 1 tablet by mouth daily 90 tablet 3    memantine (NAMENDA) 10 MG tablet Take 1 tablet by mouth 2 times daily 180 tablet 3    Insulin Pen Needle (B-D ULTRAFINE III SHORT PEN) 31G X 8 MM MISC 1 each by Does not apply route daily 100 each 5    Cholecalciferol (VITAMIN D3) 5000 units CAPS Take 1 capsule by mouth daily (Patient taking differently: Take 2,000 Units by mouth daily ) 90 capsule 3    Handicap Placard MISC by Does not apply route DX: Dementia (F03.90), Type 2 Diabetes (E11.8), Atrial Fibrillation (I48.91)     EXPIRES: 05/2023 1 each 0    warfarin (COUMADIN) 2.5 MG tablet Take 2.5 mg by mouth daily Take daily as directed per physician based on INR results      Psyllium ([employer] NATURAL FIBER SUPPLEMENT PO) Take 1 tablet by mouth 2 times daily       ferrous sulfate (FE TABS) 325 (65 Fe) MG EC tablet Take 1 tablet by mouth daily (with breakfast) 90 tablet 1    acetaminophen (APAP EXTRA STRENGTH) 500 MG tablet Take 2 tablets by mouth 3 times daily as needed for Pain (Patient taking differently: Take 1,000 mg by mouth 3 times daily as needed for Pain ) 60 tablet 3    Blood Glucose Monitoring Suppl TROY Use TID 1 Device 0    Glucose Blood (BLOOD GLUCOSE TEST STRIPS) STRP Use  strip 11    Lancets MISC 1 each by Does not apply route 3 times daily 100 each 11    Blood Glucose Monitoring Suppl (FREESTYLE FREEDOM) KIT Test once daily. DX: E11.8 1 kit 0    sotalol (BETAPACE) 80 MG tablet Take 1 tablet by mouth 2 times daily 180 tablet 1    Calcium Carbonate-Vitamin D (CALCIUM + D PO) Take 1 tablet by mouth daily      Compression Stockings MISC by Does not apply route. Knee high 1 each 0    atenolol (TENORMIN) 50 MG tablet Take 50 mg by mouth daily Take 1 tablet daily (Patient not taking: Reported on 8/16/2021)      atorvastatin (LIPITOR) 10 MG tablet Take 10 mg by mouth daily At bedtime      aspirin 81 MG tablet Take 81 mg by mouth daily. No current facility-administered medications on file prior to visit.           Review of Systems   Unable to perform ROS: Dementia   Constitutional: Negative for activity change, appetite change, chills, diaphoresis, fatigue, fever and unexpected weight change. HENT: Negative for drooling, hearing loss, mouth sores, sore throat, trouble swallowing and voice change. Eyes: Negative for discharge and visual disturbance. Respiratory: Negative for apnea, cough, choking, chest tightness, shortness of breath, wheezing and stridor. Cardiovascular: Negative for chest pain, palpitations and leg swelling. Gastrointestinal: Negative for abdominal distention, abdominal pain, anal bleeding, blood in stool, constipation, diarrhea, nausea, rectal pain and vomiting. Genitourinary: Negative for difficulty urinating, dysuria, enuresis, frequency and hematuria. Musculoskeletal: Negative for arthralgias, back pain, gait problem, joint swelling and myalgias. Skin: Negative for color change, pallor, rash and wound. Allergic/Immunologic: Negative for food allergies and immunocompromised state. Neurological: Negative for dizziness, tremors, seizures, syncope, facial asymmetry, speech difficulty, weakness, light-headedness, numbness and headaches. Hematological: Negative for adenopathy. Does not bruise/bleed easily. Psychiatric/Behavioral: Negative for agitation, behavioral problems, confusion, decreased concentration, dysphoric mood, hallucinations, self-injury, sleep disturbance and suicidal ideas. The patient is not nervous/anxious and is not hyperactive. Objective: There were no vitals taken for this visit. Physical Exam  Constitutional:       General: She is not in acute distress. Appearance: She is well-developed. She is not diaphoretic. HENT:      Head: Normocephalic and atraumatic. Right Ear: External ear normal.      Left Ear: External ear normal.      Nose: Nose normal.      Mouth/Throat:      Pharynx: No oropharyngeal exudate. Eyes:      General: No scleral icterus. Right eye: No discharge. Left eye: No discharge. Conjunctiva/sclera: Conjunctivae normal.      Pupils: Pupils are equal, round, and reactive to light. Neck:      Thyroid: No thyromegaly. Vascular: No JVD. Trachea: No tracheal deviation. Cardiovascular:      Rate and Rhythm: Normal rate and regular rhythm. Heart sounds: Normal heart sounds. Pulmonary:      Effort: Pulmonary effort is normal. No respiratory distress. Breath sounds: Normal breath sounds. No stridor. No wheezing or rales. Chest:      Chest wall: No tenderness. Abdominal:      General: Bowel sounds are normal. There is no distension. Palpations: Abdomen is soft. There is no mass. Tenderness: There is no abdominal tenderness. There is no guarding or rebound. Musculoskeletal:         General: No tenderness or deformity. Normal range of motion. Cervical back: Normal range of motion and neck supple. Lymphadenopathy:      Cervical: No cervical adenopathy. Skin:     General: Skin is warm and dry. Capillary Refill: Capillary refill takes less than 2 seconds. Findings: No erythema or rash. Neurological:      Mental Status: She is alert. Mental status is at baseline. Psychiatric:         Attention and Perception: Attention normal.         Mood and Affect: Mood normal.         Behavior: Behavior is cooperative. Cognition and Memory: Cognition is impaired. Memory is impaired. Assessment:       Diagnosis Orders   1. Pressure injury of sacral region, stage 3 (HCC)  Gauze Pads & Dressings (BIATAIN SACRAL FOAM DRESSING) 9\"X9\" PADS    sodium hypochlorite (DAKINS) 0.125 % SOLN external solution    Carbomer Gel Base (HYDROGEL) GEL    Internal Referral to Home Health   2. Palliative care encounter  Gauze Pads & Dressings (BIATAIN SACRAL FOAM DRESSING) 9\"X9\" PADS    sodium hypochlorite (DAKINS) 0.125 % SOLN external solution    Carbomer Gel Base (HYDROGEL) GEL    Internal Referral to Home Health   3.  Dementia without behavioral disturbance, unspecified dementia type [facility])  Internal Referral to Home Health   4. Anorexia     5. Frequent UTI            Plan:       Dementia  - Follow with Neurology  - Estimated Fast 6d  - Continue Zoloft  - Monitor for signs and symptoms of delirium as due to advanced age, history of cognitive difficulty, acute illness, and multiple co morbidities patient is high risk.  Offer continuous behavior redirection and orientation, avoid delirium inducing medication, expose to natural sunlight as much as possible, calm surroundings as much as possible     Frequent UTI  - Push fluids  - exercise as tolerated  - UTI ER pack in home, call prior to initiating  - Reviewed signs and symptoms of UTI      Sacral Ulcer  - Increase protein in diet  - Reposition at least every two hours  - Home health for wound care education  - Clean with Dakins, dab of hydrogel, then foam dressing      Anorexia  - Start Mirtazapine 7.5 mg po at HS  - Small, frequent meals; Maximize calories and protein  - Daily nutritional shakes      GAL Walker - CNP

## 2021-11-15 NOTE — TELEPHONE ENCOUNTER
pharmacy requesting medication refill.  Please approve or deny this request.    Rx requested:  Requested Prescriptions     Pending Prescriptions Disp Refills    amLODIPine (NORVASC) 5 MG tablet [Pharmacy Med Name: amLODIPine Besylate 5 MG Oral Tablet] 90 tablet 0     Sig: Take 1 tablet by mouth once daily         Last Office Visit:   8/16/2021      Next Visit Date:  Future Appointments   Date Time Provider Sofia Ramírez   1/3/2022  2:30 PM Morales Perez, APRN - [facility]

## 2021-11-29 NOTE — TELEPHONE ENCOUNTER
Jacqui ireland [facility] called to tell me Angeline Vee is more confused and is experincing dysuria. Family feels she has a UTI.     Order UA C&S  Send antibiotics

## 2021-12-07 NOTE — TELEPHONE ENCOUNTER
Comments:     Last Office Visit (last PCP visit):   8/16/2021    Next Visit Date:  Future Appointments   Date Time Provider Sofia Ramírez   1/3/2022  2:30 PM GAL Marin CNP [facility] [place]       **If hasn't been seen in over a year OR hasn't followed up according to last diabetes/ADHD visit, make appointment for patient before sending refill to provider.     Rx requested:  Requested Prescriptions     Pending Prescriptions Disp Refills    glipiZIDE (GLUCOTROL) 10 MG tablet [Pharmacy Med Name: glipiZIDE 10 MG Oral Tablet] 14 tablet 0     Sig: TAKE 1 TABLET BY MOUTH TWICE DAILY BEFORE MEAL(S)

## 2021-12-09 NOTE — RESULT ENCOUNTER NOTE
Please notify patient and son that recent lab work shows her kidney function testing is stable, her liver function testing is normal, her cholesterol testing is stable, her thyroid testing is normal, and her blood counts are stable. She should continue with her current medications and we will continue to follow lab work over time.

## 2022-08-16 NOTE — CARE COORDINATION
Emergency Medicine Note  HPI   HISTORY OF PRESENT ILLNESS       History provided by:  Patient and relative   used: No       85 y/o F with PMH of Parkinson's disease, HTN, acute saddle pulmonary embolus without cor pulmonale and IVC filter, elevated troponin, acute respiratory failure with hypoxia, and syncope presents to the ED from home via EMS for evaluation of possible syncope. Per son, patient has a history of syncope and was just admitted here for the same but was ultimately discharged with cardiology follow up. Today, patient was seated at the breakfast table with her  and caretaker when she became unresponsive. Son notes patient was seated with her eyes open, staring with her mouth open, and not responding to verbal or physical stimuli. No falls or injuries. Episode lasted about 15-30 seconds then patient began to respond. No bladder or bowel incontinence. No tongue injury. No tonic-clonic activity. Upon waking, she did not recall the episode. Son notes the same thing occurred last time but lasted longer last time. She arrives here AAOx4 and offers no complaints. She denies chest pain, SOB, palpitations, n/v, abdominal pain, lightheadedness, dizziness, new or worsening numbness or weakness from baseline as she notes she is primarily bed/wheelchair bound. Denies changes in hearing, vision, or speech. She does admit to chronic back pain. Son reports patient back to baseline.    PCP - [facility]        Patient History   PAST HISTORY     Reviewed from Nursing Triage:  Tobacco  Allergies  Meds  Problems  Med Hx  Surg Hx  Fam Hx  Soc   Hx        Past Medical History:   Diagnosis Date   • Anxiety    • Arthritis    • Basal cell carcinoma     forehead   • Glaucoma    • Hypertension    • Low back pain    • Lumbosacral disc disease    • Parkinson's disease (CMS/HCC)    • Peripheral neuropathy        Past Surgical History:   Procedure Laterality Date   • BLADDER SUSPENSION  2009    Gilda HOLLAND  Time:  1425  Type of Contact: Phone son/Odin. Pt contacted by Janny Billingsley 80 y.o. female    Identified need on original referral: Care giving    Medication:   Do you have all of your prescribed medications? Yes   Are you having any trouble affording medications? No   Are you involved with any prescription assistance programs? No              Name of Program: None   Other issues: Affording new medication. Son could not recall name and indicated plan to call back with name of medication for potential assistance. Housing:   Do you have affordable housing? Yes. Lives in own home. Utilities/ shut off notices? None   Other issues:  None  Care giving:   Are you able to manage your activities of daily living? Patient has 24 hour care givers in the home. Son has hired private care givers. Son supplements care givers on the weekends. Presently are you receiving any care giving assistance in the home? [facility] health care[facility]. [facility]: [facility]   Referrals: [facility]. Left message for home health care to contact son he would like services of RN to stop. Transportation:   Do you have a reliable source of transportation for medical appointments? Yes   Receiving assistance with community resources for transportation? Transportation needs:  None  Meals/Food:   Are you able to afford food? Care givers in place to fix meals. Receiving assistance with food costs: None                                          (Food [place], Food Pantries)   Are you able to prepare food for meals? Care givers provide assistance with meals. Receiving assistance with meals? Care givers provide assistance. (Meals-on-Wheels)  Medical Equipment:   Present equipment in the home:  All needed. (Durable and Lifeline)   New requests:   Financial :   Medical Bills? None   Insurance Concerns?  No    Other:     Follow-up   • CATARACT EXTRACTION, BILATERAL     • COLONOSCOPY     • HYSTERECTOMY     • JOINT REPLACEMENT     • KNEE ARTHROPLASTY  2015    left       Family History   Problem Relation Age of Onset   • No Known Problems Biological Mother    • No Known Problems Biological Father        Social History     Tobacco Use   • Smoking status: Former Smoker   • Smokeless tobacco: Never Used   • Tobacco comment: as a young teenager   Substance Use Topics   • Alcohol use: Yes     Comment: wine   • Drug use: No         Review of Systems   REVIEW OF SYSTEMS     Review of Systems   Constitutional: Negative for chills and fever.   HENT: Negative for sore throat.    Respiratory: Negative for shortness of breath.    Cardiovascular: Positive for syncope. Negative for chest pain.   Gastrointestinal: Negative for abdominal pain, nausea and vomiting.   Genitourinary: Negative for difficulty urinating and dysuria.   Musculoskeletal: Negative for back pain and myalgias.   Skin: Negative for rash.   Neurological: Positive for syncope. Negative for dizziness, speech difficulty, weakness, light-headedness, numbness and headaches.         VITALS     ED Vitals    Date/Time Temp Pulse Resp BP SpO2 [facility]   08/16/22 1217 36.4 °C (97.6 °F) 76 14 159/78 96 % [facility]   08/16/22 1100 -- 78 21 141/67 96 % [facility]   08/16/22 1036 36.3 °C (97.4 °F) 79 18 185/91 97 % [facility]        Pulse Ox %: 97 % (08/16/22 1036)  Pulse Ox Interpretation: Normal (08/16/22 1036)  Heart Rate: 79 (08/16/22 1036)  Rhythm Strip Interpretation: Normal Sinus Rhythm (08/16/22 1036)     Physical Exam   PHYSICAL EXAM     Physical Exam  Vitals and nursing note reviewed.   Constitutional:       General: She is not in acute distress.     Appearance: She is well-developed.   HENT:      Head: Normocephalic and atraumatic.      Nose: Nose normal.      Mouth/Throat:      Mouth: Mucous membranes are moist.   Eyes:      Extraocular Movements: Extraocular movements intact.      Conjunctiva/sclera: Conjunctivae  normal.      Pupils: Pupils are equal, round, and reactive to light.   Cardiovascular:      Rate and Rhythm: Normal rate and regular rhythm.      Heart sounds: Normal heart sounds.   Pulmonary:      Effort: Pulmonary effort is normal. No respiratory distress.      Breath sounds: Normal breath sounds.   Abdominal:      Palpations: Abdomen is soft.      Tenderness: There is no abdominal tenderness.   Musculoskeletal:         General: Normal range of motion.      Cervical back: Normal range of motion and neck supple.      Comments: Reduced ROM in the lower extremities bilaterally chronically.   Skin:     General: Skin is warm and dry.      Findings: No rash.   Neurological:      General: No focal deficit present.      Mental Status: She is alert and oriented to person, place, and time.      Comments: CN II-XII grossly intact. Symmetric facial movements. Tongue is midline. Sensation to light touch is intact in B/L upper and lower extremities. Reduced strength in the lower extremities chronically. Speech is clear. No pronator drift.            PROCEDURES     Procedures     DATA     Results     Procedure Component Value Units Date/Time    [facility] Draw Panel [[id number]] Collected: 08/16/22 1045    Specimen: Blood, Venous Updated: 08/16/22 1901    Narrative:      The following orders were created for panel order [facility] Draw Panel.  Procedure                               Abnormality         Status                     ---------                               -----------         ------                     CRISTINE PARRISH[[id number]]                                  Final result               RAINBOW GOLD[[id number]]                                     Final result                 Please view results for these tests on the individual orders.    CRISTINE PARRISH [[id number]] Collected: 08/16/22 1045    Specimen: Blood, Venous Updated: 08/16/22 1901    RAINBOW GOLD [[id number]] Collected: 08/16/22 1045    Specimen: Blood, Venous  Updated: 08/16/22 1901    HS Troponin I (with 2 hour reflex) [[id number]]  (Normal) Collected: 08/16/22 1045    Specimen: Blood, Venous Updated: 08/16/22 1136     High Sens Troponin I 4.6 pg/mL     Comprehensive metabolic panel [[id number]]  (Abnormal) Collected: 08/16/22 1045    Specimen: Blood, Venous Updated: 08/16/22 1136     Sodium 138 mEQ/L      Potassium 4.0 mEQ/L      Comment: Results obtained on plasma. Plasma Potassium values may be up to 0.4 mEQ/L less than serum values. The differences may be greater for patients with high platelet or white cell counts.        Chloride 108 mEQ/L      CO2 25 mEQ/L      BUN 25 mg/dL      Creatinine 0.9 mg/dL      Glucose 114 mg/dL      Calcium 10.3 mg/dL      AST (SGOT) 14 IU/L      ALT (SGPT) 7 IU/L      Alkaline Phosphatase 35 IU/L      Total Protein 6.7 g/dL      Comment: Test performed on plasma which typically contains approximately 0.4 g/dL more protein than serum.        Albumin 4.0 g/dL      Bilirubin, Total 0.8 mg/dL      eGFR 59.4 mL/min/1.73m*2      Anion Gap 5 mEQ/L     CBC and differential [[id number]] Collected: 08/16/22 1045    Specimen: Blood, Venous Updated: 08/16/22 1113     WBC 7.23 K/uL      RBC 4.63 M/uL      Hemoglobin 14.7 g/dL      Hematocrit 44.8 %      MCV 96.8 fL      MCH 31.7 pg      MCHC 32.8 g/dL      RDW 13.2 %      Platelets 211 K/uL      MPV 9.6 fL      Differential Type Auto     nRBC 0.0 %      Immature Granulocytes 0.4 %      Neutrophils 70.5 %      Lymphocytes 22.4 %      Monocytes 4.4 %      Eosinophils 1.5 %      Basophils 0.8 %      Immature Granulocytes, Absolute 0.03 K/uL      Neutrophils, Absolute 5.09 K/uL      Lymphocytes, Absolute 1.62 K/uL      Monocytes, Absolute 0.32 K/uL      Eosinophils, Absolute 0.11 K/uL      Basophils, Absolute 0.06 K/uL           Imaging Results          X-RAY CHEST 1 VIEW (Final result)  Result time 08/16/22 11:49:03    Final result                 Impression:    IMPRESSION: No acute cardiopulmonary  disease.             Narrative:    CLINICAL HISTORY: Syncope.    COMMENT: Single AP view of the chest is performed.    COMPARISON: Chest radiograph performed on June 6, 2022, CT chest performed on  November 27, 2021.    There are low lung volumes bilaterally. Cardiac silhouette is normal. Trachea is  midline. Aorta is calcified and tortuous. There is no mediastinal or hilar  adenopathy. Pulmonary vascular markings are normal in size. No pleural  effusions, pneumothorax or focal pulmonary opacities are seen.                                ECG 12 lead   ED Interpretation   Interpreted with Dr. Platt.   Rate: 80  Rhythm: Regular  Axis: LAD  MS interval: WNL  QRS: WNL  QTC: 389  Normal sinus rhythm. No obvious ischemia.   No change from prior.          Scoring tools                                  ED Course & MDM   MDM / ED COURSE / CLINICAL IMPRESSION / DISPO     MDM    ED Course as of 08/17/22 1107   Tue Aug 16, 2022   1105 Impression: Syncope    Plan: labs, trop, EKG, CXR, observe    Discussed case with Dr. Platt.  [EB]   1115 WBC: 7.23  WNL [EB]   1115 Ordered Tylenol as patient complaining of upper back pain. No fall from syncope. Patient primarily bed bound.  [EB]   1153 CXR IMPRESSION: No acute cardiopulmonary disease. [EB]   1155 High Sens Troponin I: 4.6  <5.0. Will cancel repeat.  [EB]   1158 Work-up unremarkable.  Will discharge home with PCP follow-up, cardiology follow-up, supportive care, and strict return precautions. [EB]   1159 Called to inform son of work-up and plan.  Son agreeable.  He notes that he will be  1.5 hours to  patient. [EB]      ED Course User Index  [EB] Maricruz Padilla PA C     Clinical Impression      Syncope, unspecified syncope type     Disposition  Discharge         Maricruz Padilla PA C  08/17/22 1108

## (undated) DEVICE — MEDI-VAC NON-CONDUCTIVE SUCTION TUBING: Brand: CARDINAL HEALTH

## (undated) DEVICE — 3M™ DURAPORE™ SURGICAL TAPE 2 INCHES X 10YARDS (5.0CM X 9.1M) 6ROLLS/CARTON 10CARTONS/CASE 1538-2: Brand: 3M™ DURAPORE™

## (undated) DEVICE — SKIN MARKER,REGULAR TIP WITH RULER: Brand: DEVON

## (undated) DEVICE — TOWEL,OR,DSP,ST,BLUE,STD,4/PK,20PK/CS: Brand: MEDLINE

## (undated) DEVICE — STANDARD HYPODERMIC NEEDLE,POLYPROPYLENE HUB: Brand: MONOJECT

## (undated) DEVICE — FLEXIBLE YANKAUER,LARGE TIP, NO VACUUM CONTROL: Brand: ARGYLE

## (undated) DEVICE — Device

## (undated) DEVICE — GLOVE SURG SZ 75 L12IN FNGR THK83MIL CRM POLYISOPRENE

## (undated) DEVICE — PAD,ABDOMINAL,5"X9",ST,LF,25/BX: Brand: MEDLINE INDUSTRIES, INC.

## (undated) DEVICE — SYRINGE BLB 50CC IRRIG PLIABLE FNGR FLNG GRAD FLSK DISP

## (undated) DEVICE — ELECTRODE PT RET AD L9FT HI MOIST COND ADH HYDRGEL CORDED

## (undated) DEVICE — 2000CC GUARDIAN II: Brand: GUARDIAN

## (undated) DEVICE — PACK,LAPAROTOMY,NO GOWNS: Brand: MEDLINE

## (undated) DEVICE — SUTURE CHROMIC GUT SZ 3-0 L27IN ABSRB BRN L26MM SH 1/2 CIR G122H

## (undated) DEVICE — SYRINGE MED 10ML LUERLOCK TIP W/O SFTY DISP

## (undated) DEVICE — INTENDED FOR TISSUE SEPARATION, AND OTHER PROCEDURES THAT REQUIRE A SHARP SURGICAL BLADE TO PUNCTURE OR CUT.: Brand: BARD-PARKER ® CARBON RIB-BACK BLADES

## (undated) DEVICE — 1842 FOAM BLOCK NEEDLE COUNTER: Brand: DEVON

## (undated) DEVICE — LABEL MED MINI W/ MARKER

## (undated) DEVICE — GAUZE,SPONGE,4"X4",16PLY,XRAY,STRL,LF: Brand: MEDLINE

## (undated) DEVICE — GOWN,AURORA,NONREINFORCED,LARGE: Brand: MEDLINE

## (undated) DEVICE — PENCIL ES L3M BTTN SWCH HOLSTER W/ BLDE ELECTRD EDGE

## (undated) DEVICE — GEL K Y LUBRICATING JELLY TB 15 4OZFT